# Patient Record
Sex: FEMALE | Race: WHITE | Employment: OTHER | URBAN - METROPOLITAN AREA
[De-identification: names, ages, dates, MRNs, and addresses within clinical notes are randomized per-mention and may not be internally consistent; named-entity substitution may affect disease eponyms.]

---

## 2021-05-25 ENCOUNTER — APPOINTMENT (OUTPATIENT)
Dept: GENERAL RADIOLOGY | Age: 76
DRG: 871 | End: 2021-05-25
Payer: MEDICARE

## 2021-05-25 ENCOUNTER — APPOINTMENT (OUTPATIENT)
Dept: CT IMAGING | Age: 76
DRG: 871 | End: 2021-05-25
Payer: MEDICARE

## 2021-05-25 ENCOUNTER — APPOINTMENT (OUTPATIENT)
Dept: ULTRASOUND IMAGING | Age: 76
DRG: 871 | End: 2021-05-25
Payer: MEDICARE

## 2021-05-25 ENCOUNTER — HOSPITAL ENCOUNTER (INPATIENT)
Age: 76
LOS: 9 days | Discharge: NURSING, REHAB OR LONG-TERM CARE | DRG: 871 | End: 2021-06-03
Attending: EMERGENCY MEDICINE | Admitting: INTERNAL MEDICINE
Payer: MEDICARE

## 2021-05-25 DIAGNOSIS — A41.9 SEPTIC SHOCK (HCC): Primary | ICD-10-CM

## 2021-05-25 DIAGNOSIS — R93.89 ABNORMAL CT SCAN: ICD-10-CM

## 2021-05-25 DIAGNOSIS — N30.01 ACUTE CYSTITIS WITH HEMATURIA: ICD-10-CM

## 2021-05-25 DIAGNOSIS — J96.01 ACUTE RESPIRATORY FAILURE WITH HYPOXIA (HCC): ICD-10-CM

## 2021-05-25 DIAGNOSIS — F41.9 ANXIETY: ICD-10-CM

## 2021-05-25 DIAGNOSIS — I26.99 ACUTE PULMONARY EMBOLISM, UNSPECIFIED PULMONARY EMBOLISM TYPE, UNSPECIFIED WHETHER ACUTE COR PULMONALE PRESENT (HCC): ICD-10-CM

## 2021-05-25 DIAGNOSIS — R65.21 SEPTIC SHOCK (HCC): Primary | ICD-10-CM

## 2021-05-25 DIAGNOSIS — R52 PAIN: ICD-10-CM

## 2021-05-25 LAB
ALBUMIN SERPL-MCNC: 3 G/DL (ref 3.5–5.2)
ALP BLD-CCNC: 94 U/L (ref 35–104)
ALT SERPL-CCNC: 12 U/L (ref 0–32)
ANION GAP SERPL CALCULATED.3IONS-SCNC: 19 MMOL/L (ref 7–16)
ANISOCYTOSIS: ABNORMAL
AST SERPL-CCNC: 24 U/L (ref 0–31)
BACTERIA: ABNORMAL /HPF
BASOPHILS ABSOLUTE: 0 E9/L (ref 0–0.2)
BASOPHILS RELATIVE PERCENT: 0 % (ref 0–2)
BILIRUB SERPL-MCNC: 0.4 MG/DL (ref 0–1.2)
BILIRUBIN URINE: NEGATIVE
BLOOD, URINE: ABNORMAL
BUN BLDV-MCNC: 14 MG/DL (ref 6–23)
CALCIUM SERPL-MCNC: 9 MG/DL (ref 8.6–10.2)
CHLORIDE BLD-SCNC: 95 MMOL/L (ref 98–107)
CLARITY: CLEAR
CO2: 14 MMOL/L (ref 22–29)
COLOR: YELLOW
CREAT SERPL-MCNC: 1.3 MG/DL (ref 0.5–1)
EOSINOPHILS ABSOLUTE: 0 E9/L (ref 0.05–0.5)
EOSINOPHILS RELATIVE PERCENT: 0 % (ref 0–6)
EPITHELIAL CELLS, UA: ABNORMAL /HPF
GFR AFRICAN AMERICAN: 48
GFR NON-AFRICAN AMERICAN: 40 ML/MIN/1.73
GLUCOSE BLD-MCNC: 169 MG/DL (ref 74–99)
GLUCOSE URINE: NEGATIVE MG/DL
HCT VFR BLD CALC: 32.1 % (ref 34–48)
HEMOGLOBIN: 9.4 G/DL (ref 11.5–15.5)
KETONES, URINE: NEGATIVE MG/DL
LACTIC ACID, SEPSIS: 5.1 MMOL/L (ref 0.5–1.9)
LACTIC ACID, SEPSIS: 6.3 MMOL/L (ref 0.5–1.9)
LEUKOCYTE ESTERASE, URINE: ABNORMAL
LYMPHOCYTES ABSOLUTE: 0.7 E9/L (ref 1.5–4)
LYMPHOCYTES RELATIVE PERCENT: 10.3 % (ref 20–42)
MCH RBC QN AUTO: 22.7 PG (ref 26–35)
MCHC RBC AUTO-ENTMCNC: 29.3 % (ref 32–34.5)
MCV RBC AUTO: 77.3 FL (ref 80–99.9)
METAMYELOCYTES RELATIVE PERCENT: 1.7 % (ref 0–1)
METER GLUCOSE: 179 MG/DL (ref 74–99)
MONOCYTES ABSOLUTE: 0 E9/L (ref 0.1–0.95)
MONOCYTES RELATIVE PERCENT: 0 % (ref 2–12)
NEUTROPHILS ABSOLUTE: 6.3 E9/L (ref 1.8–7.3)
NEUTROPHILS RELATIVE PERCENT: 88 % (ref 43–80)
NITRITE, URINE: NEGATIVE
NUCLEATED RED BLOOD CELLS: 0 /100 WBC
OVALOCYTES: ABNORMAL
PDW BLD-RTO: 17.1 FL (ref 11.5–15)
PH UA: 5 (ref 5–9)
PLATELET # BLD: 472 E9/L (ref 130–450)
PMV BLD AUTO: 10.4 FL (ref 7–12)
POIKILOCYTES: ABNORMAL
POLYCHROMASIA: ABNORMAL
POTASSIUM REFLEX MAGNESIUM: 5.3 MMOL/L (ref 3.5–5)
POTASSIUM SERPL-SCNC: 4 MMOL/L (ref 3.5–5)
PRO-BNP: 5097 PG/ML (ref 0–450)
PROTEIN UA: 100 MG/DL
RBC # BLD: 4.15 E12/L (ref 3.5–5.5)
RBC UA: ABNORMAL /HPF (ref 0–2)
SARS-COV-2, NAAT: NOT DETECTED
SCHISTOCYTES: ABNORMAL
SODIUM BLD-SCNC: 128 MMOL/L (ref 132–146)
SPECIFIC GRAVITY UA: 1.02 (ref 1–1.03)
TEAR DROP CELLS: ABNORMAL
TOTAL PROTEIN: 7.1 G/DL (ref 6.4–8.3)
TROPONIN, HIGH SENSITIVITY: 37 NG/L (ref 0–9)
TROPONIN, HIGH SENSITIVITY: 38 NG/L (ref 0–9)
UROBILINOGEN, URINE: 0.2 E.U./DL
WBC # BLD: 7 E9/L (ref 4.5–11.5)
WBC UA: ABNORMAL /HPF (ref 0–5)

## 2021-05-25 PROCEDURE — 82533 TOTAL CORTISOL: CPT

## 2021-05-25 PROCEDURE — 71045 X-RAY EXAM CHEST 1 VIEW: CPT

## 2021-05-25 PROCEDURE — 99223 1ST HOSP IP/OBS HIGH 75: CPT | Performed by: INTERNAL MEDICINE

## 2021-05-25 PROCEDURE — 2580000003 HC RX 258: Performed by: EMERGENCY MEDICINE

## 2021-05-25 PROCEDURE — 87077 CULTURE AEROBIC IDENTIFY: CPT

## 2021-05-25 PROCEDURE — 2500000003 HC RX 250 WO HCPCS: Performed by: EMERGENCY MEDICINE

## 2021-05-25 PROCEDURE — 93970 EXTREMITY STUDY: CPT

## 2021-05-25 PROCEDURE — 96365 THER/PROPH/DIAG IV INF INIT: CPT

## 2021-05-25 PROCEDURE — 70450 CT HEAD/BRAIN W/O DYE: CPT

## 2021-05-25 PROCEDURE — 74176 CT ABD & PELVIS W/O CONTRAST: CPT

## 2021-05-25 PROCEDURE — 2000000000 HC ICU R&B

## 2021-05-25 PROCEDURE — 96367 TX/PROPH/DG ADDL SEQ IV INF: CPT

## 2021-05-25 PROCEDURE — 36592 COLLECT BLOOD FROM PICC: CPT

## 2021-05-25 PROCEDURE — 6370000000 HC RX 637 (ALT 250 FOR IP): Performed by: STUDENT IN AN ORGANIZED HEALTH CARE EDUCATION/TRAINING PROGRAM

## 2021-05-25 PROCEDURE — 71275 CT ANGIOGRAPHY CHEST: CPT

## 2021-05-25 PROCEDURE — 83605 ASSAY OF LACTIC ACID: CPT

## 2021-05-25 PROCEDURE — 96372 THER/PROPH/DIAG INJ SC/IM: CPT

## 2021-05-25 PROCEDURE — 87635 SARS-COV-2 COVID-19 AMP PRB: CPT

## 2021-05-25 PROCEDURE — 2580000003 HC RX 258: Performed by: STUDENT IN AN ORGANIZED HEALTH CARE EDUCATION/TRAINING PROGRAM

## 2021-05-25 PROCEDURE — 87081 CULTURE SCREEN ONLY: CPT

## 2021-05-25 PROCEDURE — 87186 SC STD MICRODIL/AGAR DIL: CPT

## 2021-05-25 PROCEDURE — 87040 BLOOD CULTURE FOR BACTERIA: CPT

## 2021-05-25 PROCEDURE — 6360000002 HC RX W HCPCS: Performed by: STUDENT IN AN ORGANIZED HEALTH CARE EDUCATION/TRAINING PROGRAM

## 2021-05-25 PROCEDURE — 80053 COMPREHEN METABOLIC PANEL: CPT

## 2021-05-25 PROCEDURE — 84484 ASSAY OF TROPONIN QUANT: CPT

## 2021-05-25 PROCEDURE — 87150 DNA/RNA AMPLIFIED PROBE: CPT

## 2021-05-25 PROCEDURE — 93005 ELECTROCARDIOGRAM TRACING: CPT | Performed by: STUDENT IN AN ORGANIZED HEALTH CARE EDUCATION/TRAINING PROGRAM

## 2021-05-25 PROCEDURE — 82962 GLUCOSE BLOOD TEST: CPT

## 2021-05-25 PROCEDURE — 6360000004 HC RX CONTRAST MEDICATION: Performed by: RADIOLOGY

## 2021-05-25 PROCEDURE — 99285 EMERGENCY DEPT VISIT HI MDM: CPT

## 2021-05-25 PROCEDURE — 81001 URINALYSIS AUTO W/SCOPE: CPT

## 2021-05-25 PROCEDURE — 85025 COMPLETE CBC W/AUTO DIFF WBC: CPT

## 2021-05-25 PROCEDURE — 84132 ASSAY OF SERUM POTASSIUM: CPT

## 2021-05-25 PROCEDURE — 83880 ASSAY OF NATRIURETIC PEPTIDE: CPT

## 2021-05-25 RX ORDER — LOPERAMIDE HYDROCHLORIDE 2 MG/1
2 CAPSULE ORAL EVERY 6 HOURS PRN
Status: ON HOLD | COMMUNITY
End: 2021-06-03 | Stop reason: HOSPADM

## 2021-05-25 RX ORDER — UMECLIDINIUM 62.5 UG/1
1 AEROSOL, POWDER ORAL DAILY
Status: ON HOLD | COMMUNITY
End: 2021-06-03 | Stop reason: HOSPADM

## 2021-05-25 RX ORDER — DONEPEZIL HYDROCHLORIDE 5 MG/1
5 TABLET, FILM COATED ORAL NIGHTLY
COMMUNITY

## 2021-05-25 RX ORDER — FERROUS SULFATE 325(65) MG
325 TABLET ORAL
COMMUNITY

## 2021-05-25 RX ORDER — ESCITALOPRAM OXALATE 5 MG/1
10 TABLET ORAL DAILY
Status: ON HOLD | COMMUNITY
End: 2021-06-03 | Stop reason: HOSPADM

## 2021-05-25 RX ORDER — SODIUM CHLORIDE 0.9 % (FLUSH) 0.9 %
5-40 SYRINGE (ML) INJECTION EVERY 12 HOURS SCHEDULED
Status: DISCONTINUED | OUTPATIENT
Start: 2021-05-25 | End: 2021-05-26

## 2021-05-25 RX ORDER — 0.9 % SODIUM CHLORIDE 0.9 %
1000 INTRAVENOUS SOLUTION INTRAVENOUS ONCE
Status: COMPLETED | OUTPATIENT
Start: 2021-05-25 | End: 2021-05-25

## 2021-05-25 RX ORDER — ALBUTEROL SULFATE 90 UG/1
2 AEROSOL, METERED RESPIRATORY (INHALATION) EVERY 8 HOURS
Status: ON HOLD | COMMUNITY
End: 2021-06-03 | Stop reason: HOSPADM

## 2021-05-25 RX ORDER — MECLIZINE HYDROCHLORIDE 25 MG/1
25 TABLET ORAL EVERY 8 HOURS PRN
Status: ON HOLD | COMMUNITY
End: 2021-06-03 | Stop reason: HOSPADM

## 2021-05-25 RX ORDER — SODIUM CHLORIDE 9 MG/ML
25 INJECTION, SOLUTION INTRAVENOUS PRN
Status: DISCONTINUED | OUTPATIENT
Start: 2021-05-25 | End: 2021-06-03 | Stop reason: HOSPADM

## 2021-05-25 RX ORDER — DEXTROSE MONOHYDRATE 50 MG/ML
100 INJECTION, SOLUTION INTRAVENOUS PRN
Status: DISCONTINUED | OUTPATIENT
Start: 2021-05-25 | End: 2021-06-03 | Stop reason: HOSPADM

## 2021-05-25 RX ORDER — 0.9 % SODIUM CHLORIDE 0.9 %
250 INTRAVENOUS SOLUTION INTRAVENOUS ONCE
Status: DISCONTINUED | OUTPATIENT
Start: 2021-05-25 | End: 2021-06-03 | Stop reason: HOSPADM

## 2021-05-25 RX ORDER — SODIUM CHLORIDE 9 MG/ML
INJECTION, SOLUTION INTRAVENOUS EVERY 8 HOURS
Status: DISCONTINUED | OUTPATIENT
Start: 2021-05-25 | End: 2021-05-26

## 2021-05-25 RX ORDER — NICOTINE POLACRILEX 4 MG
15 LOZENGE BUCCAL PRN
Status: DISCONTINUED | OUTPATIENT
Start: 2021-05-25 | End: 2021-06-03 | Stop reason: HOSPADM

## 2021-05-25 RX ORDER — MAGNESIUM OXIDE 400 MG/1
400 TABLET ORAL 2 TIMES DAILY
COMMUNITY

## 2021-05-25 RX ORDER — HYDROXYZINE PAMOATE 25 MG/1
25 CAPSULE ORAL 3 TIMES DAILY PRN
Status: ON HOLD | COMMUNITY
End: 2021-06-03 | Stop reason: HOSPADM

## 2021-05-25 RX ORDER — DEXTROSE MONOHYDRATE 25 G/50ML
12.5 INJECTION, SOLUTION INTRAVENOUS PRN
Status: DISCONTINUED | OUTPATIENT
Start: 2021-05-25 | End: 2021-06-03 | Stop reason: HOSPADM

## 2021-05-25 RX ORDER — ACETAMINOPHEN 650 MG/1
650 SUPPOSITORY RECTAL ONCE
Status: COMPLETED | OUTPATIENT
Start: 2021-05-25 | End: 2021-05-25

## 2021-05-25 RX ORDER — ACETAMINOPHEN 500 MG
1000 TABLET ORAL ONCE
Status: COMPLETED | OUTPATIENT
Start: 2021-05-25 | End: 2021-05-25

## 2021-05-25 RX ORDER — SODIUM CHLORIDE 9 MG/ML
INJECTION, SOLUTION INTRAVENOUS CONTINUOUS
Status: DISCONTINUED | OUTPATIENT
Start: 2021-05-25 | End: 2021-05-26

## 2021-05-25 RX ORDER — SODIUM CHLORIDE 0.9 % (FLUSH) 0.9 %
5-40 SYRINGE (ML) INJECTION PRN
Status: DISCONTINUED | OUTPATIENT
Start: 2021-05-25 | End: 2021-06-03 | Stop reason: HOSPADM

## 2021-05-25 RX ORDER — MAGNESIUM HYDROXIDE/ALUMINUM HYDROXICE/SIMETHICONE 120; 1200; 1200 MG/30ML; MG/30ML; MG/30ML
20 SUSPENSION ORAL 4 TIMES DAILY PRN
COMMUNITY

## 2021-05-25 RX ORDER — ACETAMINOPHEN 325 MG/1
650 TABLET ORAL EVERY 6 HOURS PRN
Status: DISCONTINUED | OUTPATIENT
Start: 2021-05-25 | End: 2021-06-01

## 2021-05-25 RX ORDER — ACETAMINOPHEN 650 MG/1
650 SUPPOSITORY RECTAL EVERY 6 HOURS PRN
Status: DISCONTINUED | OUTPATIENT
Start: 2021-05-25 | End: 2021-06-01

## 2021-05-25 RX ORDER — ACETAMINOPHEN 325 MG/1
325 TABLET ORAL EVERY 6 HOURS PRN
COMMUNITY

## 2021-05-25 RX ORDER — ATORVASTATIN CALCIUM 40 MG/1
40 TABLET, FILM COATED ORAL DAILY
COMMUNITY

## 2021-05-25 RX ORDER — INSULIN GLARGINE 100 [IU]/ML
15 INJECTION, SOLUTION SUBCUTANEOUS NIGHTLY
Status: ON HOLD | COMMUNITY
End: 2021-06-03 | Stop reason: HOSPADM

## 2021-05-25 RX ORDER — CLOPIDOGREL BISULFATE 75 MG/1
75 TABLET ORAL DAILY
Status: ON HOLD | COMMUNITY
End: 2021-06-03 | Stop reason: HOSPADM

## 2021-05-25 RX ORDER — FAMOTIDINE 20 MG/1
20 TABLET, FILM COATED ORAL DAILY
Status: ON HOLD | COMMUNITY
End: 2021-06-03 | Stop reason: HOSPADM

## 2021-05-25 RX ADMIN — ACETAMINOPHEN 1000 MG: 500 TABLET ORAL at 19:18

## 2021-05-25 RX ADMIN — SODIUM CHLORIDE 1000 ML: 9 INJECTION, SOLUTION INTRAVENOUS at 17:07

## 2021-05-25 RX ADMIN — NOREPINEPHRINE BITARTRATE 5 MCG/MIN: 1 INJECTION, SOLUTION, CONCENTRATE INTRAVENOUS at 18:08

## 2021-05-25 RX ADMIN — ACETAMINOPHEN 650 MG: 650 SUPPOSITORY RECTAL at 15:29

## 2021-05-25 RX ADMIN — VANCOMYCIN HYDROCHLORIDE 1500 MG: 10 INJECTION, POWDER, LYOPHILIZED, FOR SOLUTION INTRAVENOUS at 19:07

## 2021-05-25 RX ADMIN — SODIUM CHLORIDE 1000 ML: 9 INJECTION, SOLUTION INTRAVENOUS at 15:15

## 2021-05-25 RX ADMIN — IOPAMIDOL 75 ML: 755 INJECTION, SOLUTION INTRAVENOUS at 16:40

## 2021-05-25 RX ADMIN — HYDROCORTISONE SODIUM SUCCINATE 100 MG: 100 INJECTION, POWDER, FOR SOLUTION INTRAMUSCULAR; INTRAVENOUS at 21:49

## 2021-05-25 RX ADMIN — CEFEPIME HYDROCHLORIDE 2000 MG: 2 INJECTION, POWDER, FOR SOLUTION INTRAVENOUS at 17:12

## 2021-05-25 RX ADMIN — ENOXAPARIN SODIUM 70 MG: 80 INJECTION SUBCUTANEOUS at 19:18

## 2021-05-25 ASSESSMENT — PAIN SCALES - GENERAL
PAINLEVEL_OUTOF10: 0
PAINLEVEL_OUTOF10: 6
PAINLEVEL_OUTOF10: 8

## 2021-05-25 NOTE — ED PROVIDER NOTES
PHYSICIAN ATTESTATION:     I have personally performed and/or participated in the history, exam, medical decision making, and procedures and agree with all pertinent clinical information. I have also reviewed and agree with the past medical, family and social history unless otherwise noted. I have discussed this patient in detail with the resident, and provided the instruction and education regarding sepsis. My findings/Plan: 77 of lung presents for evaluation of fever and tachycardia concerning for sepsis from her nursing home. Patient is alert and has no complaints at this time she denies any pain or shortness of breath however she is on submental oxygen at this time tachycardic and febrile. Septic work-up. She then became hypotensive central line was placed for Levophed. CTA shows bilateral segmental/subsegmental PEs and Lovenox was started patient was started on Rocephin for UTI          [MF]   1721 Found to have PEs bilateral.    [WL]   1924 Spoke with Dr. Kane Stephen, he agrees to admit the patient to the ICU    [WL]   2049 Spoke with Dr. Shahbaz Mckay, he agrees to admit the patient.    [WL]      ED Course User Index  [MF] Devorah Sweet DO  [WL] Dario Del Rosario DO        Patient presents to the ED for evaluation. This patient was seen and evaluated with the attending. Work-up was performed with concerns for but not limited to ACS, arrhythmia, Pneumonia, Viral illness and UTI  Lab work and imaging showed urinary tract infection. Patient was septic so was started with a sepsis bolus of fluid but despite this she remained hypotensive. She was then started on levo. Despite levo she remained hypotensive and was given a dose of Solu-Cortef. ICU will admit the patient to them under medicine's admission. She was given broad-spectrum antibiotics. She was found to be hypoxic which she is not normally on oxygen on but was placed on O2 nasal cannula with good improvement of her O2 saturations.   She Eosinophils % 0.0 0.0 - 6.0 %    Basophils % 0.0 0.0 - 2.0 %    Neutrophils Absolute 6.30 1.80 - 7.30 E9/L    Lymphocytes Absolute 0.70 (L) 1.50 - 4.00 E9/L    Monocytes Absolute 0.00 (L) 0.10 - 0.95 E9/L    Eosinophils Absolute 0.00 (L) 0.05 - 0.50 E9/L    Basophils Absolute 0.00 0.00 - 0.20 E9/L    Metamyelocytes Relative 1.7 (H) 0.0 - 1.0 %    nRBC 0.0 /100 WBC    Anisocytosis 1+     Polychromasia 1+     Poikilocytes 1+     Schistocytes 1+     Ovalocytes 1+     Tear Drop Cells 1+    Troponin   Result Value Ref Range    Troponin, High Sensitivity 37 (H) 0 - 9 ng/mL   Brain Natriuretic Peptide   Result Value Ref Range    Pro-BNP 5,097 (H) 0 - 450 pg/mL   Comprehensive Metabolic Panel w/ Reflex to MG   Result Value Ref Range    Sodium 128 (L) 132 - 146 mmol/L    Potassium reflex Magnesium 5.3 (H) 3.5 - 5.0 mmol/L    Chloride 95 (L) 98 - 107 mmol/L    CO2 14 (L) 22 - 29 mmol/L    Anion Gap 19 (H) 7 - 16 mmol/L    Glucose 169 (H) 74 - 99 mg/dL    BUN 14 6 - 23 mg/dL    CREATININE 1.3 (H) 0.5 - 1.0 mg/dL    GFR Non-African American 40 >=60 mL/min/1.73    GFR African American 48     Calcium 9.0 8.6 - 10.2 mg/dL    Total Protein 7.1 6.4 - 8.3 g/dL    Albumin 3.0 (L) 3.5 - 5.2 g/dL    Total Bilirubin 0.4 0.0 - 1.2 mg/dL    Alkaline Phosphatase 94 35 - 104 U/L    ALT 12 0 - 32 U/L    AST 24 0 - 31 U/L   Lactate, Sepsis   Result Value Ref Range    Lactic Acid, Sepsis 6.3 (HH) 0.5 - 1.9 mmol/L   Lactate, Sepsis   Result Value Ref Range    Lactic Acid, Sepsis 5.1 (HH) 0.5 - 1.9 mmol/L   Urinalysis with Microscopic   Result Value Ref Range    Color, UA Yellow Straw/Yellow    Clarity, UA Clear Clear    Glucose, Ur Negative Negative mg/dL    Bilirubin Urine Negative Negative    Ketones, Urine Negative Negative mg/dL    Specific Gravity, UA 1.020 1.005 - 1.030    Blood, Urine TRACE-INTACT Negative    pH, UA 5.0 5.0 - 9.0    Protein,  (A) Negative mg/dL    Urobilinogen, Urine 0.2 <2.0 E.U./dL    Nitrite, Urine Negative Negative    Leukocyte Esterase, Urine SMALL (A) Negative    WBC, UA 5-10 (A) 0 - 5 /HPF    RBC, UA 5-10 (A) 0 - 2 /HPF    Epithelial Cells, UA FEW /HPF    Bacteria, UA MODERATE (A) None Seen /HPF   Potassium   Result Value Ref Range    Potassium 4.0 3.5 - 5.0 mmol/L   Troponin   Result Value Ref Range    Troponin, High Sensitivity 38 (H) 0 - 9 ng/mL   POCT Glucose   Result Value Ref Range    Meter Glucose 179 (H) 74 - 99 mg/dL   EKG 12 Lead   Result Value Ref Range    Ventricular Rate 118 BPM    Atrial Rate 118 BPM    P-R Interval 128 ms    QRS Duration 80 ms    Q-T Interval 344 ms    QTc Calculation (Bazett) 482 ms    P Axis 36 degrees    R Axis -8 degrees    T Axis 59 degrees       Radiology  US DUP LOWER EXTREMITIES BILATERAL VENOUS   Final Result   No evidence of DVT in either lower extremity given the limitations described. CT ABDOMEN PELVIS WO CONTRAST Additional Contrast? None   Final Result   Large gallstones in the dependent portion of the gallbladder. Mostly hypodense and homogeneous right adrenal nodule measuring 2.8 cm in   diameter, probable adenoma. Further evaluation with adrenal washout CT or   chemical shift MRI recommended. Other chronic or incidental findings as noted. CTA CHEST W CONTRAST   Final Result   1. There are limitation on the present study due motion artifacts caused   misregistration of the images as above discussed. 2.  However there appears to be signs for acute/subacute pulmonary embolus in   the lower lobe pulmonary arteries more noticeable on the right lower lobe as   above commented. Preliminary report given to Dr. Noel Frank, ER physician borderline. CT HEAD WO CONTRAST   Final Result   No acute intracranial abnormality. Periventricular white matter changes consistent chronic microvascular   disease. Diffuse volume loss.          XR CHEST PORTABLE   Final Result   Interstitial opacities in perihilar locations could suggest subsegmental   atelectasis or bronchopneumonia. Short-term follow-up may be helpful for   further evaluation.                 ------------------------- NURSING NOTES AND VITALS REVIEWED ---------------------------  Date / Time Roomed:  5/25/2021  2:10 PM  ED Bed Assignment:  0207/0207-A    The nursing notes within the ED encounter and vital signs as below have been reviewed. Patient Vitals for the past 24 hrs:   BP Temp Temp src Pulse Resp SpO2 Height Weight   05/26/21 0000 (!) 129/44 99.5 °F (37.5 °C) Bladder 85 20 93 % -- 165 lb 12.6 oz (75.2 kg)   05/25/21 2300 (!) 100/43 99.8 °F (37.7 °C) Bladder 83 18 95 % -- --   05/25/21 2146 (!) 102/49 99.5 °F (37.5 °C) CORE 88 16 98 % -- --   05/25/21 2100 (!) 113/53 -- -- 88 -- -- -- --   05/25/21 2050 (!) 100/50 99.5 °F (37.5 °C) CORE 89 16 95 % -- --   05/25/21 2036 (!) 60/30 -- -- -- -- -- -- --   05/25/21 2020 (!) 64/27 -- -- 89 16 96 % -- --   05/25/21 1950 (!) 51/33 -- -- 91 -- -- -- --   05/25/21 1940 (!) 53/25 100.8 °F (38.2 °C) CORE 94 16 96 % -- --   05/25/21 1858 (!) 70/39 -- -- 94 16 94 % -- --   05/25/21 1847 (!) 66/33 -- -- 94 -- 92 % -- --   05/25/21 1822 (!) 55/32 101.3 °F (38.5 °C) -- 97 16 93 % -- --   05/25/21 1805 (!) 48/31 101.5 °F (38.6 °C) -- 98 16 96 % -- --   05/25/21 1735 (!) 58/27 101.7 °F (38.7 °C) CORE 96 16 92 % -- --   05/25/21 1719 (!) 60/32 -- -- 97 16 95 % -- --   05/25/21 1608 -- -- -- -- -- -- -- 164 lb (74.4 kg)   05/25/21 1424 -- -- -- -- -- -- 5' (1.524 m) --   05/25/21 1423 -- (!) 105.4 °F (40.8 °C) Rectal -- -- -- -- --   05/25/21 1418 (!) 151/64 102.8 °F (39.3 °C) -- 129 16 95 % 5' (1.524 m) --       Oxygen Saturation Interpretation: Abnormal and Improved after treatment        I have spoken with the patient and discussed todays results, in addition to providing specific details for the plan of care and counseling regarding the diagnosis and prognosis.   Their questions are answered at this time and they are agreeable with the

## 2021-05-25 NOTE — ED NOTES
Bed: 10  Expected date:   Expected time:   Means of arrival:   Comments:  ems     Bianca Boykin RN  05/25/21 1536

## 2021-05-25 NOTE — ED NOTES
Blood pressure 60/32 manual. Dr. Jennifer Seth notified. Orders to set up for [facility].      Dewey Dawn RN  05/25/21 1122

## 2021-05-26 LAB
ACINETOBACTER BAUMANNII BY PCR: NOT DETECTED
ALBUMIN SERPL-MCNC: 2.7 G/DL (ref 3.5–5.2)
ALP BLD-CCNC: 78 U/L (ref 35–104)
ALT SERPL-CCNC: 10 U/L (ref 0–32)
ANION GAP SERPL CALCULATED.3IONS-SCNC: 14 MMOL/L (ref 7–16)
ANION GAP SERPL CALCULATED.3IONS-SCNC: 16 MMOL/L (ref 7–16)
ANISOCYTOSIS: ABNORMAL
AST SERPL-CCNC: 14 U/L (ref 0–31)
BASOPHILS ABSOLUTE: 0.26 E9/L (ref 0–0.2)
BASOPHILS RELATIVE PERCENT: 0.9 % (ref 0–2)
BILIRUB SERPL-MCNC: 0.3 MG/DL (ref 0–1.2)
BOTTLE TYPE: ABNORMAL
BUN BLDV-MCNC: 17 MG/DL (ref 6–23)
BUN BLDV-MCNC: 19 MG/DL (ref 6–23)
BURR CELLS: ABNORMAL
CALCIUM SERPL-MCNC: 7.6 MG/DL (ref 8.6–10.2)
CALCIUM SERPL-MCNC: 8 MG/DL (ref 8.6–10.2)
CANDIDA ALBICANS BY PCR: NOT DETECTED
CANDIDA GLABRATA BY PCR: NOT DETECTED
CANDIDA KRUSEI BY PCR: NOT DETECTED
CANDIDA PARAPSILOSIS BY PCR: NOT DETECTED
CANDIDA TROPICALIS BY PCR: NOT DETECTED
CARBAPENEM RESISTANCE KPC BY PCR: NOT DETECTED
CHLORIDE BLD-SCNC: 104 MMOL/L (ref 98–107)
CHLORIDE BLD-SCNC: 105 MMOL/L (ref 98–107)
CO2: 14 MMOL/L (ref 22–29)
CO2: 15 MMOL/L (ref 22–29)
CORTISOL TOTAL: 66.6 MCG/DL (ref 2.68–18.4)
CREAT SERPL-MCNC: 1.3 MG/DL (ref 0.5–1)
CREAT SERPL-MCNC: 1.4 MG/DL (ref 0.5–1)
DOHLE BODIES: ABNORMAL
EKG ATRIAL RATE: 118 BPM
EKG P AXIS: 36 DEGREES
EKG P-R INTERVAL: 128 MS
EKG Q-T INTERVAL: 344 MS
EKG QRS DURATION: 80 MS
EKG QTC CALCULATION (BAZETT): 482 MS
EKG R AXIS: -8 DEGREES
EKG T AXIS: 59 DEGREES
EKG VENTRICULAR RATE: 118 BPM
ENTEROBACTER CLOACAE COMPLEX BY PCR: NOT DETECTED
ENTEROBACTERALES BY PCR: DETECTED
ENTEROCOCCUS BY PCR: NOT DETECTED
EOSINOPHILS ABSOLUTE: 0.26 E9/L (ref 0.05–0.5)
EOSINOPHILS RELATIVE PERCENT: 0.9 % (ref 0–6)
ESCHERICHIA COLI BY PCR: NOT DETECTED
FERRITIN: 81 NG/ML
GFR AFRICAN AMERICAN: 44
GFR AFRICAN AMERICAN: 48
GFR NON-AFRICAN AMERICAN: 36 ML/MIN/1.73
GFR NON-AFRICAN AMERICAN: 40 ML/MIN/1.73
GLUCOSE BLD-MCNC: 160 MG/DL (ref 74–99)
GLUCOSE BLD-MCNC: 222 MG/DL (ref 74–99)
HAEMOPHILUS INFLUENZAE BY PCR: NOT DETECTED
HBA1C MFR BLD: 5.6 % (ref 4–5.6)
HCT VFR BLD CALC: 26.5 % (ref 34–48)
HEMOCCULT STL QL: ABNORMAL
HEMOCCULT STL QL: ABNORMAL
HEMOCCULT STL QL: POSITIVE
HEMOGLOBIN: 7.9 G/DL (ref 11.5–15.5)
IRON SATURATION: 4 % (ref 15–50)
IRON: 8 MCG/DL (ref 37–145)
KLEBSIELLA OXYTOCA BY PCR: NOT DETECTED
KLEBSIELLA PNEUMONIAE GROUP BY PCR: DETECTED
LACTIC ACID: 3.1 MMOL/L (ref 0.5–2.2)
LACTIC ACID: 4 MMOL/L (ref 0.5–2.2)
LACTIC ACID: 4.8 MMOL/L (ref 0.5–2.2)
LACTIC ACID: 5.4 MMOL/L (ref 0.5–2.2)
LISTERIA MONOCYTOGENES BY PCR: NOT DETECTED
LV EF: 58 %
LVEF MODALITY: NORMAL
LYMPHOCYTES ABSOLUTE: 0.86 E9/L (ref 1.5–4)
LYMPHOCYTES RELATIVE PERCENT: 2.6 % (ref 20–42)
MCH RBC QN AUTO: 23.2 PG (ref 26–35)
MCHC RBC AUTO-ENTMCNC: 29.8 % (ref 32–34.5)
MCV RBC AUTO: 77.9 FL (ref 80–99.9)
METAMYELOCYTES RELATIVE PERCENT: 11.5 % (ref 0–1)
METER GLUCOSE: 132 MG/DL (ref 74–99)
METER GLUCOSE: 184 MG/DL (ref 74–99)
METER GLUCOSE: 223 MG/DL (ref 74–99)
METER GLUCOSE: 274 MG/DL (ref 74–99)
MONOCYTES ABSOLUTE: 0.57 E9/L (ref 0.1–0.95)
MONOCYTES RELATIVE PERCENT: 1.8 % (ref 2–12)
MYELOCYTE PERCENT: 6.2 % (ref 0–0)
NEISSERIA MENINGITIDIS BY PCR: NOT DETECTED
NEUTROPHILS ABSOLUTE: 26.88 E9/L (ref 1.8–7.3)
NEUTROPHILS RELATIVE PERCENT: 76.1 % (ref 43–80)
NUCLEATED RED BLOOD CELLS: 0 /100 WBC
ORDER NUMBER: ABNORMAL
OVALOCYTES: ABNORMAL
PDW BLD-RTO: 17.3 FL (ref 11.5–15)
PLATELET # BLD: 385 E9/L (ref 130–450)
PMV BLD AUTO: 11.2 FL (ref 7–12)
POIKILOCYTES: ABNORMAL
POLYCHROMASIA: ABNORMAL
POTASSIUM REFLEX MAGNESIUM: 4.2 MMOL/L (ref 3.5–5)
POTASSIUM SERPL-SCNC: 4.8 MMOL/L (ref 3.5–5)
PROTEUS SPECIES BY PCR: NOT DETECTED
PSEUDOMONAS AERUGINOSA BY PCR: NOT DETECTED
RBC # BLD: 3.4 E12/L (ref 3.5–5.5)
SCHISTOCYTES: ABNORMAL
SERRATIA MARCESCENS BY PCR: NOT DETECTED
SODIUM BLD-SCNC: 134 MMOL/L (ref 132–146)
SODIUM BLD-SCNC: 134 MMOL/L (ref 132–146)
SOURCE OF BLOOD CULTURE: ABNORMAL
STAPHYLOCOCCUS AUREUS BY PCR: NOT DETECTED
STAPHYLOCOCCUS SPECIES BY PCR: NOT DETECTED
STREPTOCOCCUS AGALACTIAE BY PCR: NOT DETECTED
STREPTOCOCCUS PNEUMONIAE BY PCR: NOT DETECTED
STREPTOCOCCUS PYOGENES  BY PCR: NOT DETECTED
STREPTOCOCCUS SPECIES BY PCR: NOT DETECTED
TEAR DROP CELLS: ABNORMAL
TOTAL IRON BINDING CAPACITY: 197 MCG/DL (ref 250–450)
TOTAL PROTEIN: 6.3 G/DL (ref 6.4–8.3)
TOXIC GRANULATION: ABNORMAL
VACUOLATED NEUTROPHILS: ABNORMAL
WBC # BLD: 28.6 E9/L (ref 4.5–11.5)

## 2021-05-26 PROCEDURE — 6360000002 HC RX W HCPCS: Performed by: INTERNAL MEDICINE

## 2021-05-26 PROCEDURE — 6360000002 HC RX W HCPCS: Performed by: SPECIALIST

## 2021-05-26 PROCEDURE — 82728 ASSAY OF FERRITIN: CPT

## 2021-05-26 PROCEDURE — 2580000003 HC RX 258: Performed by: SPECIALIST

## 2021-05-26 PROCEDURE — 85025 COMPLETE CBC W/AUTO DIFF WBC: CPT

## 2021-05-26 PROCEDURE — 2580000003 HC RX 258: Performed by: EMERGENCY MEDICINE

## 2021-05-26 PROCEDURE — 2580000003 HC RX 258: Performed by: INTERNAL MEDICINE

## 2021-05-26 PROCEDURE — 99221 1ST HOSP IP/OBS SF/LOW 40: CPT | Performed by: NURSE PRACTITIONER

## 2021-05-26 PROCEDURE — 80048 BASIC METABOLIC PNL TOTAL CA: CPT

## 2021-05-26 PROCEDURE — 2000000000 HC ICU R&B

## 2021-05-26 PROCEDURE — 6360000002 HC RX W HCPCS: Performed by: STUDENT IN AN ORGANIZED HEALTH CARE EDUCATION/TRAINING PROGRAM

## 2021-05-26 PROCEDURE — 36620 INSERTION CATHETER ARTERY: CPT

## 2021-05-26 PROCEDURE — 37799 UNLISTED PX VASCULAR SURGERY: CPT

## 2021-05-26 PROCEDURE — 83550 IRON BINDING TEST: CPT

## 2021-05-26 PROCEDURE — 36415 COLL VENOUS BLD VENIPUNCTURE: CPT

## 2021-05-26 PROCEDURE — 2580000003 HC RX 258: Performed by: STUDENT IN AN ORGANIZED HEALTH CARE EDUCATION/TRAINING PROGRAM

## 2021-05-26 PROCEDURE — 2700000000 HC OXYGEN THERAPY PER DAY

## 2021-05-26 PROCEDURE — 2500000003 HC RX 250 WO HCPCS: Performed by: EMERGENCY MEDICINE

## 2021-05-26 PROCEDURE — 80053 COMPREHEN METABOLIC PANEL: CPT

## 2021-05-26 PROCEDURE — 2580000003 HC RX 258

## 2021-05-26 PROCEDURE — C9113 INJ PANTOPRAZOLE SODIUM, VIA: HCPCS | Performed by: STUDENT IN AN ORGANIZED HEALTH CARE EDUCATION/TRAINING PROGRAM

## 2021-05-26 PROCEDURE — 83540 ASSAY OF IRON: CPT

## 2021-05-26 PROCEDURE — 6370000000 HC RX 637 (ALT 250 FOR IP): Performed by: INTERNAL MEDICINE

## 2021-05-26 PROCEDURE — 2500000003 HC RX 250 WO HCPCS: Performed by: INTERNAL MEDICINE

## 2021-05-26 PROCEDURE — 99233 SBSQ HOSP IP/OBS HIGH 50: CPT | Performed by: INTERNAL MEDICINE

## 2021-05-26 PROCEDURE — 83605 ASSAY OF LACTIC ACID: CPT

## 2021-05-26 PROCEDURE — 6370000000 HC RX 637 (ALT 250 FOR IP): Performed by: STUDENT IN AN ORGANIZED HEALTH CARE EDUCATION/TRAINING PROGRAM

## 2021-05-26 PROCEDURE — 82962 GLUCOSE BLOOD TEST: CPT

## 2021-05-26 PROCEDURE — 83036 HEMOGLOBIN GLYCOSYLATED A1C: CPT

## 2021-05-26 PROCEDURE — 36592 COLLECT BLOOD FROM PICC: CPT

## 2021-05-26 PROCEDURE — 94640 AIRWAY INHALATION TREATMENT: CPT

## 2021-05-26 PROCEDURE — 93306 TTE W/DOPPLER COMPLETE: CPT

## 2021-05-26 RX ORDER — IPRATROPIUM BROMIDE AND ALBUTEROL SULFATE 2.5; .5 MG/3ML; MG/3ML
1 SOLUTION RESPIRATORY (INHALATION)
Status: DISCONTINUED | OUTPATIENT
Start: 2021-05-26 | End: 2021-06-03 | Stop reason: HOSPADM

## 2021-05-26 RX ORDER — HYDROXYZINE PAMOATE 25 MG/1
25 CAPSULE ORAL 3 TIMES DAILY PRN
Status: DISCONTINUED | OUTPATIENT
Start: 2021-05-26 | End: 2021-06-03 | Stop reason: HOSPADM

## 2021-05-26 RX ORDER — PANTOPRAZOLE SODIUM 40 MG/10ML
40 INJECTION, POWDER, LYOPHILIZED, FOR SOLUTION INTRAVENOUS 2 TIMES DAILY
Status: DISCONTINUED | OUTPATIENT
Start: 2021-05-26 | End: 2021-06-03 | Stop reason: HOSPADM

## 2021-05-26 RX ORDER — SODIUM CHLORIDE 9 MG/ML
10 INJECTION INTRAVENOUS 2 TIMES DAILY
Status: DISCONTINUED | OUTPATIENT
Start: 2021-05-26 | End: 2021-06-03 | Stop reason: HOSPADM

## 2021-05-26 RX ORDER — LORAZEPAM 2 MG/ML
0.5 INJECTION INTRAMUSCULAR EVERY 6 HOURS PRN
Status: DISCONTINUED | OUTPATIENT
Start: 2021-05-26 | End: 2021-06-03 | Stop reason: HOSPADM

## 2021-05-26 RX ORDER — FENTANYL CITRATE 50 UG/ML
25 INJECTION, SOLUTION INTRAMUSCULAR; INTRAVENOUS
Status: DISCONTINUED | OUTPATIENT
Start: 2021-05-26 | End: 2021-06-01

## 2021-05-26 RX ORDER — ARFORMOTEROL TARTRATE 15 UG/2ML
15 SOLUTION RESPIRATORY (INHALATION) 2 TIMES DAILY
Status: DISCONTINUED | OUTPATIENT
Start: 2021-05-26 | End: 2021-06-03 | Stop reason: HOSPADM

## 2021-05-26 RX ORDER — 0.9 % SODIUM CHLORIDE 0.9 %
1000 INTRAVENOUS SOLUTION INTRAVENOUS ONCE
Status: COMPLETED | OUTPATIENT
Start: 2021-05-26 | End: 2021-05-26

## 2021-05-26 RX ORDER — SODIUM CHLORIDE, SODIUM LACTATE, POTASSIUM CHLORIDE, AND CALCIUM CHLORIDE .6; .31; .03; .02 G/100ML; G/100ML; G/100ML; G/100ML
1000 INJECTION, SOLUTION INTRAVENOUS ONCE
Status: COMPLETED | OUTPATIENT
Start: 2021-05-26 | End: 2021-05-26

## 2021-05-26 RX ORDER — SODIUM CHLORIDE 9 MG/ML
INJECTION, SOLUTION INTRAVENOUS EVERY 12 HOURS
Status: DISCONTINUED | OUTPATIENT
Start: 2021-05-26 | End: 2021-05-28

## 2021-05-26 RX ORDER — PANTOPRAZOLE SODIUM 40 MG/10ML
40 INJECTION, POWDER, LYOPHILIZED, FOR SOLUTION INTRAVENOUS
Status: DISCONTINUED | OUTPATIENT
Start: 2021-05-26 | End: 2021-05-26

## 2021-05-26 RX ORDER — SODIUM CHLORIDE 9 MG/ML
10 INJECTION INTRAVENOUS DAILY
Status: DISCONTINUED | OUTPATIENT
Start: 2021-05-26 | End: 2021-05-26

## 2021-05-26 RX ORDER — LANOLIN ALCOHOL/MO/W.PET/CERES
3 CREAM (GRAM) TOPICAL NIGHTLY PRN
Status: DISCONTINUED | OUTPATIENT
Start: 2021-05-26 | End: 2021-06-03 | Stop reason: HOSPADM

## 2021-05-26 RX ORDER — BUDESONIDE 0.5 MG/2ML
0.5 INHALANT ORAL 2 TIMES DAILY
Status: DISCONTINUED | OUTPATIENT
Start: 2021-05-26 | End: 2021-06-03 | Stop reason: HOSPADM

## 2021-05-26 RX ADMIN — SODIUM CHLORIDE, PRESERVATIVE FREE 10 ML: 5 INJECTION INTRAVENOUS at 09:11

## 2021-05-26 RX ADMIN — IPRATROPIUM BROMIDE AND ALBUTEROL SULFATE 1 AMPULE: 2.5; .5 SOLUTION RESPIRATORY (INHALATION) at 19:55

## 2021-05-26 RX ADMIN — INSULIN LISPRO 2 UNITS: 100 INJECTION, SOLUTION INTRAVENOUS; SUBCUTANEOUS at 18:22

## 2021-05-26 RX ADMIN — CEFEPIME HYDROCHLORIDE 2000 MG: 2 INJECTION, POWDER, FOR SOLUTION INTRAVENOUS at 14:31

## 2021-05-26 RX ADMIN — SODIUM CHLORIDE: 9 INJECTION, SOLUTION INTRAVENOUS at 07:52

## 2021-05-26 RX ADMIN — SODIUM CHLORIDE, POTASSIUM CHLORIDE, SODIUM LACTATE AND CALCIUM CHLORIDE 1000 ML: 600; 310; 30; 20 INJECTION, SOLUTION INTRAVENOUS at 15:10

## 2021-05-26 RX ADMIN — Medication: at 13:58

## 2021-05-26 RX ADMIN — Medication: at 21:07

## 2021-05-26 RX ADMIN — FENTANYL CITRATE 25 MCG: 50 INJECTION, SOLUTION INTRAMUSCULAR; INTRAVENOUS at 11:44

## 2021-05-26 RX ADMIN — INSULIN LISPRO 3 UNITS: 100 INJECTION, SOLUTION INTRAVENOUS; SUBCUTANEOUS at 11:47

## 2021-05-26 RX ADMIN — INSULIN LISPRO 1 UNITS: 100 INJECTION, SOLUTION INTRAVENOUS; SUBCUTANEOUS at 00:36

## 2021-05-26 RX ADMIN — SODIUM CHLORIDE, PRESERVATIVE FREE 10 ML: 5 INJECTION INTRAVENOUS at 20:23

## 2021-05-26 RX ADMIN — ARFORMOTEROL TARTRATE 15 MCG: 15 SOLUTION RESPIRATORY (INHALATION) at 13:13

## 2021-05-26 RX ADMIN — INSULIN LISPRO 2 UNITS: 100 INJECTION, SOLUTION INTRAVENOUS; SUBCUTANEOUS at 09:29

## 2021-05-26 RX ADMIN — HYDROXYZINE PAMOATE 25 MG: 25 CAPSULE ORAL at 01:43

## 2021-05-26 RX ADMIN — HYDROCORTISONE SODIUM SUCCINATE 100 MG: 100 INJECTION, POWDER, FOR SOLUTION INTRAMUSCULAR; INTRAVENOUS at 21:05

## 2021-05-26 RX ADMIN — NOREPINEPHRINE BITARTRATE 35 MCG/MIN: 1 INJECTION, SOLUTION, CONCENTRATE INTRAVENOUS at 06:16

## 2021-05-26 RX ADMIN — FENTANYL CITRATE 25 MCG: 50 INJECTION, SOLUTION INTRAMUSCULAR; INTRAVENOUS at 09:11

## 2021-05-26 RX ADMIN — IPRATROPIUM BROMIDE AND ALBUTEROL SULFATE 1 AMPULE: 2.5; .5 SOLUTION RESPIRATORY (INHALATION) at 17:01

## 2021-05-26 RX ADMIN — ENOXAPARIN SODIUM 70 MG: 80 INJECTION SUBCUTANEOUS at 20:23

## 2021-05-26 RX ADMIN — HYDROCORTISONE SODIUM SUCCINATE 100 MG: 100 INJECTION, POWDER, FOR SOLUTION INTRAMUSCULAR; INTRAVENOUS at 14:31

## 2021-05-26 RX ADMIN — HYDROCORTISONE SODIUM SUCCINATE 100 MG: 100 INJECTION, POWDER, FOR SOLUTION INTRAMUSCULAR; INTRAVENOUS at 07:25

## 2021-05-26 RX ADMIN — WATER 2 ML: 1 INJECTION INTRAMUSCULAR; INTRAVENOUS; SUBCUTANEOUS at 21:06

## 2021-05-26 RX ADMIN — ENOXAPARIN SODIUM 70 MG: 80 INJECTION SUBCUTANEOUS at 08:25

## 2021-05-26 RX ADMIN — NOREPINEPHRINE BITARTRATE 30 MCG/MIN: 1 INJECTION, SOLUTION, CONCENTRATE INTRAVENOUS at 13:58

## 2021-05-26 RX ADMIN — PANTOPRAZOLE SODIUM 40 MG: 40 INJECTION, POWDER, FOR SOLUTION INTRAVENOUS at 20:23

## 2021-05-26 RX ADMIN — BUDESONIDE 500 MCG: 0.5 SUSPENSION RESPIRATORY (INHALATION) at 19:55

## 2021-05-26 RX ADMIN — ERTAPENEM SODIUM 1000 MG: 1 INJECTION, POWDER, LYOPHILIZED, FOR SOLUTION INTRAMUSCULAR; INTRAVENOUS at 11:44

## 2021-05-26 RX ADMIN — PANTOPRAZOLE SODIUM 40 MG: 40 INJECTION, POWDER, FOR SOLUTION INTRAVENOUS at 09:11

## 2021-05-26 RX ADMIN — ACETAMINOPHEN 650 MG: 325 TABLET ORAL at 01:46

## 2021-05-26 RX ADMIN — SODIUM CHLORIDE 1000 ML: 9 INJECTION, SOLUTION INTRAVENOUS at 11:31

## 2021-05-26 RX ADMIN — ARFORMOTEROL TARTRATE 15 MCG: 15 SOLUTION RESPIRATORY (INHALATION) at 19:55

## 2021-05-26 RX ADMIN — SODIUM CHLORIDE: 9 INJECTION, SOLUTION INTRAVENOUS at 17:37

## 2021-05-26 RX ADMIN — LORAZEPAM 0.5 MG: 2 INJECTION INTRAMUSCULAR; INTRAVENOUS at 14:10

## 2021-05-26 RX ADMIN — HYDROMORPHONE HYDROCHLORIDE 0.5 MG: 1 INJECTION, SOLUTION INTRAMUSCULAR; INTRAVENOUS; SUBCUTANEOUS at 02:32

## 2021-05-26 RX ADMIN — BUDESONIDE 500 MCG: 0.5 SUSPENSION RESPIRATORY (INHALATION) at 13:13

## 2021-05-26 RX ADMIN — CEFEPIME 1000 MG: 1 INJECTION, POWDER, FOR SOLUTION INTRAMUSCULAR; INTRAVENOUS at 06:18

## 2021-05-26 RX ADMIN — SODIUM CHLORIDE: 9 INJECTION, SOLUTION INTRAVENOUS at 00:35

## 2021-05-26 ASSESSMENT — PAIN SCALES - GENERAL
PAINLEVEL_OUTOF10: 0
PAINLEVEL_OUTOF10: 0
PAINLEVEL_OUTOF10: 5
PAINLEVEL_OUTOF10: 0
PAINLEVEL_OUTOF10: 4
PAINLEVEL_OUTOF10: 0
PAINLEVEL_OUTOF10: 0
PAINLEVEL_OUTOF10: 10
PAINLEVEL_OUTOF10: 0
PAINLEVEL_OUTOF10: 6
PAINLEVEL_OUTOF10: 0

## 2021-05-26 ASSESSMENT — PAIN DESCRIPTION - DESCRIPTORS
DESCRIPTORS: ACHING;DISCOMFORT;SORE
DESCRIPTORS: ACHING
DESCRIPTORS: ACHING;DISCOMFORT;SORE

## 2021-05-26 ASSESSMENT — PAIN DESCRIPTION - FREQUENCY
FREQUENCY: CONTINUOUS
FREQUENCY: CONTINUOUS

## 2021-05-26 ASSESSMENT — PAIN DESCRIPTION - LOCATION
LOCATION: GENERALIZED
LOCATION: BACK;COCCYX;GENERALIZED
LOCATION: BACK

## 2021-05-26 ASSESSMENT — PAIN DESCRIPTION - PROGRESSION: CLINICAL_PROGRESSION: RAPIDLY WORSENING

## 2021-05-26 ASSESSMENT — PAIN DESCRIPTION - PAIN TYPE
TYPE: ACUTE PAIN

## 2021-05-26 ASSESSMENT — PAIN - FUNCTIONAL ASSESSMENT: PAIN_FUNCTIONAL_ASSESSMENT: PREVENTS OR INTERFERES SOME ACTIVE ACTIVITIES AND ADLS

## 2021-05-26 ASSESSMENT — PAIN DESCRIPTION - ORIENTATION: ORIENTATION: LOWER

## 2021-05-26 ASSESSMENT — PAIN DESCRIPTION - ONSET: ONSET: SUDDEN

## 2021-05-26 NOTE — H&P
[facility] History and Physical      CHIEF COMPLAINT: Fever    History of Present Illness: 53-year-old female with a history of hypertension stage I diastolic heart failure and diabetes. Resides at [facility]. It is unclear what brought her to the ED but apparently she was sent due to concern for infection. On presentation her temperature was 105. Soon after presentation her blood pressure dropped as low as 51/25 requiring Levophed. Also placed on 6 L of oxygen maintaining saturation of 96%. When seen the patient is oriented x3 but believes she was still at [facility]. She does not recall the events of today and also seems to have short-term memory difficulties. Denies any complaints. Does not recall any fever before presentation, no cough, no shortness of breath, no burning micturition, no nausea vomiting or diarrhea. When seen on Levophed 17 with , on 6 L of oxygen saturating 96%. CT of the chest showed possible emboli so started on anticoagulation. Informant(s) for H&P: Patient    REVIEW OF SYSTEMS:  A comprehensive 14 point review of systems was negative except for: what is in the HPI    Active Problems  Reconcile with Patient's Chart  Problem Noted Date   Status post bilateral total hip replacement 08/08/2018   Overview:     1. LTHR 2015  2. RTHR in 1/2016 by Dr. Chichi Jason    Obesity (BMI 35.0-39.9 without comorbidity) 08/11/2017   Overview:     1. Gzctob=021 lbs, BMI=37.11 on 8/11/2017, cigmbs=123 lbs, BMI=37.69 on 8/8/2018     Allergy to penicillin 08/11/2017   Proteinuria 08/11/2017   Overview:     1. Evaluated by Dr. Urbano Salazar hypertension 08/07/2017   S/P hysterectomy 08/07/2017   Atypical chest pain 08/07/2017   Overview:     1. Regadenoson MPS 9/25/2014: Normal perfusion and wall motion, EF=96%  2.  ECHO 6/1/2017: Mild CLVH, normal LV size and wall motion, stage I diastolic dysfunction with elevated LA pressure     TIA (transient ischemic attack) 08/07/2017 Overview:     1. Admission to [facility] on 2017 with dizziness and slurred speech, CT head unremarkable, EKG: NSR, minor non-specific T wave abnormality, otherwise normal tracing  2. CTA neck 2017: Negative for significant carotid artery disease  3.  ECHO 2017: Mild CLVH, stage I diastolic dysfunction with elevated LA pressure, otherwise normal     History of total bilateral knee replacement     Renal calculi     Sacroiliitis(Notable Code), right     Inj musc/tend the rotator cuff of left shoulder, init     Surgical History    Surgery Date Site/Laterality Comments   HYSTERECTOMY         TOTAL KNEE REPLACEMENT  Bilateral Knee replacement, total     LUMPECTOMY  Left      TOTAL HIP REPLACEMENT 2015 Left Hip replacement, total     FLUOROSCOPIC GUIDANCE FOR NEEDLE PLACEMENT 2015 Left DJD Left Hip     HIP SURGERY 16 Right right total hip replacement     Medical History    Medical History Date Comments   History of total left hip replacement       History of total bilateral knee replacement       High blood pressure       Arthritis       Renal calculi       Degenerative joint disease (DJD) of hip, left       Greater trochanteric bursitis of left hip       Sacroiliitis(Notable Code), right       Impingement syndrome of left shoulder       Inj musc/tend the rotator cuff of left shoulder, init       Greater trochanteric bursitis of right hip       Diabetes mellitus ([facility] [address]       Family History    Medical History Relation Name Comments   Cancer Biological Father       Heart Disease Biological Father       Diabetes Biological Mother       Heart Disease Biological Mother       Arthritis Other Fx Hx of     Heart Disease Other Fx Hx of       Relation Name Status Comments   Biological Father        Biological Mother        Child 2 living Alive     Other Fx Hx of Other     Sibling 2 living Alive     Social History    Tobacco Use Types Packs/Day Years Used Date   Current Every Day Smoker   0.25       Smokeless Tobacco: Never Used           Tobacco Cessation: Ready to Quit: No; Counseling Given: Yes     Alcohol Use Drinks/Week oz/Week Comments   No           Sex Assigned at Birth Date Recorded   Not on file         PHYSICAL EXAM:  Vitals:  BP (!) 113/53   Pulse 88   Temp 99.5 °F (37.5 °C) (Core)   Resp 16   Ht 5' (1.524 m)   Wt 164 lb (74.4 kg)   SpO2 95%   BMI 32.03 kg/m²   General Appearance: alert and oriented to person, somewhat to place and time and in no acute distress  Skin: warm and dry, turgor not diminished  Head: normocephalic and atraumatic  Eyes: pupils equal, round, and reactive to light, extraocular eye movements intact, conjunctivae normal  Neck: neck supple and non tender without mass   Pulmonary/Chest: clear to auscultation bilaterally- no wheezes, rales or rhonchi, normal air movement, no respiratory distress  Cardiovascular: normal rate, normal S1 and S2 and no M/R/R  Abdomen: soft, non-tender, non-distended, normal bowel sounds, no masses or organomegaly  Extremities: no cyanosis, no clubbing and no edema. Bilateral tenderness lower extremities  Neurologic: no cranial nerve deficit and speech normal    LABS:  Recent Labs     05/25/21  1455 05/25/21  1701   *  --    K 5.3* 4.0   CL 95*  --    CO2 14*  --    BUN 14  --    CREATININE 1.3*  --    GLUCOSE 169*  --    CALCIUM 9.0  --        Recent Labs     05/25/21  1455   WBC 7.0   RBC 4.15   HGB 9.4*   HCT 32.1*   MCV 77.3*   MCH 22.7*   MCHC 29.3*   RDW 17.1*   *   MPV 10.4       No results for input(s): POCGLU in the last 72 hours. Radiology:   CTA CHEST W CONTRAST   Final Result   1. There are limitation on the present study due motion artifacts caused   misregistration of the images as above discussed. 2.  However there appears to be signs for acute/subacute pulmonary embolus in   the lower lobe pulmonary arteries more noticeable on the right lower lobe as   above commented. Preliminary report given to Dr. Sara Malone, ER physician borderline. CT HEAD WO CONTRAST   Final Result   No acute intracranial abnormality. Periventricular white matter changes consistent chronic microvascular   disease. Diffuse volume loss. XR CHEST PORTABLE   Final Result   Interstitial opacities in perihilar locations could suggest subsegmental   atelectasis or bronchopneumonia. Short-term follow-up may be helpful for   further evaluation. CT ABDOMEN PELVIS WO CONTRAST Additional Contrast? None    (Results Pending)       EKG: No acute normality    ASSESSMENT:    Septic shock (HCC)  Pulmonary embolism  Acute respiratory failure with hypoxia  Acute kidney injury: Creatinine 0.9 earlier this year  Lactic acidosis  Hyponatremia  Microcytic anemia  Diabetes mellitus type 2  Essential hypertension  Grade 1 diastolic heart failure    PLAN:  Septic shock: No readily apparent source. ? UTI.   -Check CT abdomen for further evaluation. Emboli to lungs? Septic, check echo   -Vanco and Zosyn.   -Continue with pressors, IV fluids  -Solu-Cortef  -Intensivist consulted in ED  -Trend lactic acid IV fluids    Pulmonary embolism on CAT scan with hypoxia:  -Anticoagulation, Doppler of lower extremities  -Echo    Diabetes mellitus type 2: Basal/bolus with SSI    Microcytic anemia: Check iron panel    Hypertension: on pressors    Code Status: Chemical code. DNR CC for nursing home but she expressed limited code. Consult palliative care  DVT prophylaxis: lovenox. NOTE: This report was transcribed using voice recognition software. Every effort was made to ensure accuracy; however, inadvertent computerized transcription errors may be present.   Electronically signed by Bari Essex, MD on 5/25/2021 at 9:40 PM

## 2021-05-26 NOTE — PROCEDURES
Arterial Line Placement Procedure Note                     Indication: shock    Consent: The patient provided verbal consent for this procedure. Ezequiel's Test: Normal    Procedure: The skin over the right radial artery was prepped with betadine and draped in a sterile fashion. Local anesthesia was obtained by infiltration using 1% Lidocaine without epinephrine. A 20 gauge arterial line catheter was then inserted, using a modified Seldinger technique, into the vessel. The transducer set was then attached and securely fastened to the skin with sutures. Waveforms on the monitor were observed and found to be adequate. The patient had good distal perfusion after the procedure. The site was then dressed in a sterile fashion. The patient tolerated the procedure well.      Complications: None    Brodie Amaya DO PGY1

## 2021-05-26 NOTE — DISCHARGE INSTR - COC
Continuity of Care Form    Patient Name: Dorisann Peabody   :  1945  MRN:  [id number]    Admit date:  2021  Discharge date:  6/3/2021    Code Status Order: Limited   Advance Directives:   [facility] Documentation     Date/Time Healthcare Directive Type of Healthcare Directive Copy in [address] Agent's Name Healthcare Agent's Phone Number    [phone]  Yes, patient has an advance directive for healthcare treatment  Health care treatment directive  Yes, copy in chart  --  --  --          Admitting Physician:  Deepa Diggs MD  PCP: Danie Farias MD    Discharging Nurse: Dez Colin RN   [address] Unit/Room#: 0207/0207-A  Discharging Unit Phone Number: [phone]    Emergency Contact:   Extended Emergency Contact Information  Primary Emergency Contact: Frockadvisor Ovens  Home Phone: [phone]  Mobile Phone: [phone]  Relation: Child   needed? No    Past Surgical History:  Past Surgical History:   Procedure Laterality Date    HYSTERECTOMY      JOINT REPLACEMENT      JOINT REPLACEMENT Bilateral     JOINT REPLACEMENT Bilateral     TONSILLECTOMY         Immunization History: There is no immunization history on file for this patient.     Active Problems:  Patient Active Problem List   Diagnosis Code    Septic shock ([facility] A41.9, R65.21       Isolation/Infection:   Isolation          No Isolation        Patient Infection Status     Infection Onset Added Last Indicated Last Indicated By Review Planned Expiration Resolved Resolved By    None active    Resolved    COVID-19 Rule Out 21 COVID-19, Rapid (Ordered)   21 Rule-Out Test Resulted          Nurse Assessment:  Last Vital Signs: BP (!) 100/42   Pulse 87   Temp 101.7 °F (38.7 °C) (Bladder)   Resp 19   Ht 5' (1.524 m)   Wt 165 lb 12.6 oz (75.2 kg)   SpO2 95%   BMI 32.38 kg/m²     Last documented pain score (0-10 scale): Pain Level: 10  Last Weight:   Wt Readings from Last 1 Encounters:   05/26/21 165 lb 12.6 oz (75.2 kg)     Mental Status:  oriented and alert    IV Access:  - Peripheral IV - site  L Upper Arm, insertion date: 5/28/2021    Nursing Mobility/ADLs:  Walking   Dependent  Transfer  Dependent  Bathing  Dependent  Dressing  Dependent  Toileting  Dependent  Feeding  Assisted  Med Admin  Assisted  Med Delivery   whole    Wound Care Documentation and Therapy:        Elimination:  Continence:   · Bowel: No  · Bladder: No  Urinary Catheter: Insertion Date: 5/25/2021   Colostomy/Ileostomy/Ileal Conduit: No       Date of Last BM: 6/2/2021    Intake/Output Summary (Last 24 hours) at 5/26/2021 0837  Last data filed at 5/26/2021 0618  Gross per 24 hour   Intake 1545 ml   Output 775 ml   Net 770 ml     I/O last 3 completed shifts: In: 9449 [I.V.:233; IV Piggyback:1312]  Out: 095 [Urine:775]    Safety Concerns: At Risk for Falls    Impairments/Disabilities:      None    Nutrition Therapy:  Current Nutrition Therapy:   - Oral Diet:  Carb Control 4 carbs/meal (1800kcals/day) and Dental Soft    Routes of Feeding: Oral  Liquids: No Restrictions  Daily Fluid Restriction: no  Last Modified Barium Swallow with Video (Video Swallowing Test): not done    Treatments at the Time of Hospital Discharge:   Respiratory Treatments: ***  Oxygen Therapy:  is on oxygen at 13 L/min per nasal cannula.   Ventilator:    - No ventilator support    Rehab Therapies: Physical Therapy and Occupational Therapy  Weight Bearing Status/Restrictions: No weight bearing restirctions  Other Medical Equipment (for information only, NOT a DME order):  wheelchair and hospital bed  Other Treatments: ***    Patient's personal belongings (please select all that are sent with patient):  None    RN SIGNATURE:  Electronically signed by Sumi Cochran RN on 6/3/21 at 4:32 PM EDT    CASE MANAGEMENT/SOCIAL WORK SECTION    Inpatient Status Date: ***    Readmission Risk Assessment Score:  Readmission Risk              Risk of Unplanned Readmission:  17           Discharging to Facility/ Agency   · Name: [facility]  Address:   [facility] 7th floor, [facility] [address]         Phone: [phone]       Fax: [phone]        Dialysis Facility (if appliable)   · Name:  · Address:  · Dialysis Schedule:  · Phone:  · Fax:    / signature: Electronically signed by Mark Sanderson on 5/26/21 at 8:37 AM EDT    PHYSICIAN SECTION    Prognosis: {Prognosis:6461580433}    Condition at Discharge: Stable    Rehab Potential (if transferring to Rehab): {Prognosis:0513352482}    Recommended Labs or Other Treatments After Discharge: ***    Physician Certification: I certify the above information and transfer of Avril Sauceda  is necessary for the continuing treatment of the diagnosis listed and that she requires LTAC for less 30 days.      Update Admission H&P: {CHP DME Changes in UCVAS:[id number]}    PHYSICIAN SIGNATURE:  {Esignature:[id number]}

## 2021-05-26 NOTE — CARE COORDINATION
Social Work discharge planning   SW consult \"home going\" noted. Pt is from [place]. Await contact with SNF liaison to check bed hold status. Electronically signed by Rayray Martinze on 5/26/2021 at 8:35 AM     Addendum-    Per Kim Simple with [place], pt was thee for snf skilled rehab. Her bed is not held. However, she can return if bed open and if meets criteria. BO spoke to pt's son Haresh Garza [phone], who advised pt had Covid back in December 2020. He said she was in a SNF in [place] for about a month. Haresh Garza said his niece works at [employer] [facility] [place]. Haresh Goodmaner said plan will be to return for more rehab at [facility] at discharge. Social Work to follow for support and discharge planning with CM.   Electronically signed by Rayray Martinez on 5/26/2021 at 10:31 AM

## 2021-05-26 NOTE — CONSULTS
[address] [place] [address] [facility]  Consultation Note     Admit Date: 5/25/2021  2:10 PM    Reason for Consult:   Sepsis    Attending Physician:  Nazia Marks MD    HISTORY OF PRESENT ILLNESS:             The history is obtained from extensive review of available past medical records. 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 She is seen [facility] patient was found on her driveway unconscious and had been admitted to [facility]. From there she went to a nursing facility. Presents to the ED on 5/25/2021 from [address] [place] [address] because of fever and concern for infection. Upon arrival she was hypotensive, febrile and tachycardic. She was placed on vasopressors and admitted to the ICU. She was treated with Vancomycin and Cefepime. Blood cultures are showing gram-negative rods. ID was asked to see her in consultation. On admission her white count was normal but it has jumped up to 28,000. She was given steroids.     Past Medical History:        Diagnosis Date    Anemia     Angina pectoris (HCC)     Anxiety     [facility]     COVID-19     Dementia (HCC)     Diabetes mellitus ([facility]     Hypertension     Insomnia     Muscle weakness     SONIYA (obstructive sleep apnea)     Pneumonia due to COVID-19 virus     Respiratory failure (HCC)     TIA (transient ischemic attack)      Past Surgical History:        Procedure Laterality Date    HYSTERECTOMY      JOINT REPLACEMENT      JOINT REPLACEMENT Bilateral     JOINT REPLACEMENT Bilateral     TONSILLECTOMY       Current Medications:   Scheduled Meds:   cefepime  1,000 mg Intravenous Q12H    pantoprazole  40 mg Intravenous QAM AC    And    sodium chloride (PF)  10 mL Intravenous Daily    sodium chloride  250 mL Intravenous Once    enoxaparin  1 mg/kg Subcutaneous Q12H    hydrocortisone sodium succinate PF  100 mg Intravenous Q8H    insulin lispro  0-6 Units Subcutaneous TID     insulin lispro  0-3 Units Subcutaneous Nightly    vancomycin  1,000 mg Intravenous Q24H     Continuous Infusions:   norepinephrine 35 mcg/min (05/26/21 0616)    sodium chloride      dextrose      sodium chloride 150 mL/hr at 05/26/21 0752     PRN Meds:melatonin, hydrOXYzine, fentanNYL, LORazepam, sodium chloride flush, sodium chloride, acetaminophen **OR** acetaminophen, glucose, dextrose, glucagon (rDNA), dextrose, perflutren lipid microspheres    Allergies:  Morphine and Penicillins    Social History:   Social History     Socioeconomic History    Marital status:      Spouse name: Not on file    Number of children: Not on file    Years of education: Not on file    Highest education level: Not on file   Occupational History    Not on file   Tobacco Use    Smoking status: Never Smoker    Smokeless tobacco: Never Used   Substance and Sexual Activity    Alcohol use: Never    Drug use: Never    Sexual activity: Not on file   Other Topics Concern    Not on file   Social History Narrative    Not on file     Social Determinants of Health     Financial Resource Strain:     Difficulty of Paying Living Expenses:    Food Insecurity:     Worried About Running Out of Food in the Last Year:     [address] in the Last Year:    Transportation Needs:     Lack of Transportation (Medical):      Lack of Transportation (Non-Medical):    Physical Activity:     Days of Exercise per Week:     Minutes of Exercise per Session:    Stress:     Feeling of Stress :    Social Connections:     Frequency of Communication with Friends and Family:     Frequency of Social Gatherings with Friends and Family:     Attends Hinduism Services:     Active Member of Clubs or Organizations:     Attends Club or Organization Meetings:     Marital Status:    Intimate Partner Violence:     Fear of Current or Ex-Partner:     Emotionally Abused:     Physically Abused:     Sexually Abused:       Pets: Dog  Travel: No  The patient was living at home with her granddaughter and great grand children. She is a retired nurse from [address] [place] [address],  [address] History:   No family history on file. . Otherwise non-pertinent to the chief complaint. REVIEW OF SYSTEMS:    Constitutional: Negative for fevers, chills, diaphoresis  Neurologic: Negative   Psychiatric: Negative  Rheumatologic: Negative   Endocrine: Negative  Hematologic: Negative  Immunologic: Negative  ENT: Negative  Respiratory: Negative   Cardiovascular: Negative  GI: Negative  : She reports having UTIs in the past.  Musculoskeletal: Bilateral [facility]  Skin: No rashes. PHYSICAL EXAM:    Vitals:   BP (!) 100/42   Pulse 87   Temp 101.7 °F (38.7 °C) (Bladder)   Resp 19   Ht 5' (1.524 m)   Wt 165 lb 12.6 oz (75.2 kg)   SpO2 95%   BMI 32.38 kg/m²   Constitutional: The patient is awake, alert, and oriented. Lying in bed in the ICU. She is in no distress. Skin: Warm and dry. No rashes were noted. HEENT: Eyes show round, and reactive pupils. No jaundice. Moist mucous membranes, no ulcerations, no thrush. Neck: Supple to movements. No lymphadenopathy. Chest: No use of accessory muscles to breathe. Symmetrical expansion. Auscultation reveals no wheezing, crackles, or rhonchi. Cardiovascular: S1 and S2 are rhythmic and regular. No murmurs appreciated. Abdomen: Positive bowel sounds to auscultation. Benign to palpation. No masses felt. No hepatosplenomegaly. Extremities: No clubbing, no cyanosis, no edema. Lines: Right femoral TLC 5/25/2021. Bilateral TKR surgical wound healed. No effusion. Blair catheter with clear urine.     CBC+dif:  Recent Labs     05/25/21  1455 05/26/21  0610   WBC 7.0 28.6*   HGB 9.4* 7.9*   HCT 32.1* 26.5*   MCV 77.3* 77.9*   * 385   NEUTROABS 6.30 26.88*     No results found for: CRP   No results found for: CRPHS  No results found for: SEDRATE  Lab Results   Component Value Date    ALT 10 05/26/2021    AST 14 05/26/2021    ALKPHOS 78 05/26/2021    BILITOT 0.3 05/26/2021     Lab Results   Component Value Date     05/26/2021    K 4.2 05/26/2021     05/26/2021    CO2 14 05/26/2021    BUN 17 05/26/2021    CREATININE 1.4 05/26/2021    GFRAA 44 05/26/2021    LABGLOM 36 05/26/2021    GLUCOSE 160 05/26/2021    PROT 6.3 05/26/2021    LABALBU 2.7 05/26/2021    CALCIUM 8.0 05/26/2021    BILITOT 0.3 05/26/2021    ALKPHOS 78 05/26/2021    AST 14 05/26/2021    ALT 10 05/26/2021       No results found for: PROTIME, INR    No results found for: TSH    Lab Results   Component Value Date    COLORU Yellow 05/25/2021    PHUR 5.0 05/25/2021    WBCUA 5-10 05/25/2021    RBCUA 5-10 05/25/2021    BACTERIA MODERATE 05/25/2021    CLARITYU Clear 05/25/2021    SPECGRAV 1.020 05/25/2021    LEUKOCYTESUR SMALL 05/25/2021    UROBILINOGEN 0.2 05/25/2021    BILIRUBINUR Negative 05/25/2021    BLOODU TRACE-INTACT 05/25/2021    GLUCOSEU Negative 05/25/2021       No results found for: HCO3, BE, O2SAT, PH, THGB, PCO2, PO2, TCO2  Radiology:  Reviewed    Microbiology:  Pending  Recent Labs     05/25/21  1455   BC Gram stain performed from blood culture bottle media  Gram negative rods  *     No results for input(s): ORG in the last 72 hours. Recent Labs     05/25/21  1516   BLOODCULT2 Gram stain performed from blood culture bottle media  Gram negative rods  *     No results for input(s): STREPNEUMAGU in the last 72 hours. No results for input(s): LP1UAG in the last 72 hours. No results for input(s): ASO in the last 72 hours. No results for input(s): CULTRESP in the last 72 hours. No results for input(s): PROCAL in the last 72 hours.     Assessment:  · Probable complicated UTI with sepsis  · Klebsiella septicemia associated to the above  · Septic shock associated to complicated UTI with sepsis  · Leukocytosis secondary to sepsis and steroids  · Fever secondary to complicated UTI    Plan:    · Ertapenem 1 g IV x1  · Continue Cefepime  · Stop Vancomycin  · Check cultures, baseline ESR, CRP  · Monitor labs  · Will follow with you    Thank you for having us see this patient in consultation. I will be discussing this case with the treating physicians.     Sadi Marquez MD  9:28 AM  5/26/2021

## 2021-05-26 NOTE — PROGRESS NOTES
Pharmacy Consultation Note  (Antibiotic Dosing and Monitoring)    Initial consult date: 5/25/21  Consulting physician: Dr Kt Lanier  Drug: Vancomycin  Indication: Sepsis    Age/  Gender Height Weight IBW Dosing weight  Allergy Information   75 y.o./female @FLOW(11:first:1)@ @FLOW[14:FIRST:1@     Ideal body weight: 45.5 kg (100 lb 4.9 oz)  Adjusted ideal body weight: 57.1 kg (125 lb 12.6 oz)  74.4kg  Patient has no allergy information on record. @BWUU(00)@        Date  WBC BUN SCr CrCl  (mL/min) Drug/Dose Time   Given Level(s)   (Time) Comments   5/25/21 7.0 14 1.3  33   Vancomycin 1500 mg IV  1907, 5/25/21                                            Intake/Output Summary (Last 24 hours) at 5/25/2021 2210  Last data filed at 5/25/2021 1806  Gross per 24 hour   Intake 90 ml   Output --   Net 90 ml       Historical Cultures:  No results found for: ORG  No results for input(s): BC in the last 72 hours. Cultures:  available culture and sensitivity results were reviewed in EPIC    Assessment:  66 y.o. female has been initiated Vancomycin. Estimated CrCl = 33 mL/min  Goal trough level = 15-20 mcg/mL    Plan:   Will initiate vancomycin at a dose of 1000 mg q24h  Monitor renal function   Clinical pharmacy to follow      JABARI Garcia [facility] 5/25/2021 10:10 PM

## 2021-05-26 NOTE — PROGRESS NOTES
Shelly Macias Hospitalist   Progress Note    Admitting Date and Time: 5/25/2021  2:10 PM  Admit Dx: Septic shock ([facility] [A41.9, R65.21]     Seen for follow on multiple problems as listed below. Subjective:  Comfortable in bed , In ICU , denies CP , n/v/ abd pain. Continues with sob & hypoxia & requires O2 4 L NC . Over all feels better c/f admission    ROS: denies fever, chills, cp, n/v, HA unless stated above.      pantoprazole  40 mg Intravenous QAM AC    And    sodium chloride (PF)  10 mL Intravenous Daily    cefepime  2,000 mg Intravenous Q12H    ertapenem (INVanz) IVPB  1,000 mg Intravenous Once    sodium chloride  1,000 mL Intravenous Once    sodium chloride  250 mL Intravenous Once    enoxaparin  1 mg/kg Subcutaneous Q12H    hydrocortisone sodium succinate PF  100 mg Intravenous Q8H    insulin lispro  0-6 Units Subcutaneous TID WC    insulin lispro  0-3 Units Subcutaneous Nightly     melatonin, 3 mg, Nightly PRN  hydrOXYzine, 25 mg, TID PRN  fentanNYL, 25 mcg, Q2H PRN  LORazepam, 0.5 mg, Q6H PRN  sodium chloride flush, 5-40 mL, PRN  sodium chloride, 25 mL, PRN  acetaminophen, 650 mg, Q6H PRN   Or  acetaminophen, 650 mg, Q6H PRN  glucose, 15 g, PRN  dextrose, 12.5 g, PRN  glucagon (rDNA), 1 mg, PRN  dextrose, 100 mL/hr, PRN  perflutren lipid microspheres, 1.5 mL, ONCE PRN         Objective:    BP (!) 100/42   Pulse 87   Temp 101.7 °F (38.7 °C) (Bladder)   Resp 19   Ht 5' (1.524 m)   Wt 165 lb 12.6 oz (75.2 kg)   SpO2 95%   BMI 32.38 kg/m²   General Appearance: alert and oriented to person, place and time, in no overt  distress  Skin: warm and dry  Head: normocephalic and atraumatic  Eyes: pupils equal, round, and reactive to light, extraocular eye movements intact, conjunctivae normal  Neck: supple and non-tender without mass  Pulmonary/Chest: clear to auscultation bilaterally  Cardiovascular: normal rate, regular rhythm, normal S1 and S2  Abdomen: soft, non-tender, could suggest subsegmental   atelectasis or bronchopneumonia. Short-term follow-up may be helpful for   further evaluation. Assessment:    Active Problems:    Septic shock (HCC)  Resolved Problems:    * No resolved hospital problems. *      Plan:  Septic shock/ Klebsiella bacteremia  - On IV Vasopressors , solucortef, IVF . Intensivist consulted from er. On IV cefepime + Ertapenem. ID is consulted. Lactic acidosis - On IVf , monitor    GALEN - On IVF, moniotr    Possible UTI - on IV cefepime , Ucx pending. PE as per CTA- on full dose lovenox. CC following. DM II - on basal / bolus/ ISS     Microcytic anemia - monitor    HTN by hx  - with septic shock  , on vasopressors and others as above , monitor. Dementia - resume as per home    On lovenox for dvt prophy    Limited code   Palliative care is consulted.           Electronically signed by Elenora Schwab, MD on 5/26/2021 at 11:48 AM

## 2021-05-26 NOTE — PROGRESS NOTES
CI HR MAP TPRI SVI TFC   Baseline 3.1 85 74 1922 36 44.9   Test 3.6 84 81 1801 44 45.0   % Change 16.9 -0.8 9.5 -6.3 22.6 0.3   noninvasive -  Start    Dr. Merlin Benito notified. Order obtained.

## 2021-05-26 NOTE — CONSULTS
Nebulization BID    budesonide  0.5 mg Nebulization BID    ipratropium-albuterol  1 ampule Inhalation Q4H WA    insulin lispro  0-12 Units Subcutaneous TID WC    insulin lispro  0-6 Units Subcutaneous Nightly    lactated ringers bolus  1,000 mL Intravenous Once    sodium chloride  250 mL Intravenous Once    enoxaparin  1 mg/kg Subcutaneous Q12H    hydrocortisone sodium succinate PF  100 mg Intravenous Q8H     melatonin, hydrOXYzine, fentanNYL, LORazepam, sodium chloride flush, sodium chloride, acetaminophen **OR** acetaminophen, glucose, dextrose, glucagon (rDNA), dextrose, perflutren lipid microspheres  IV Drips/Infusions   sodium chloride      IV infusion builder 150 mL/hr at 05/26/21 1358    norepinephrine 25 mcg/min (05/26/21 1421)    sodium chloride      dextrose       Home Medications  Medications Prior to Admission: insulin glargine (BASAGLAR KWIKPEN) 100 UNIT/ML injection pen, Inject 15 Units into the skin nightly  famotidine (PEPCID) 20 MG tablet, Take 20 mg by mouth daily  ferrous sulfate (IRON 325) 325 (65 Fe) MG tablet, Take 325 mg by mouth daily (with breakfast)  escitalopram (LEXAPRO) 5 MG tablet, Take 10 mg by mouth daily  Umeclidinium Bromide (INCRUSE ELLIPTA) 62.5 MCG/INH AEPB, Inhale 1 actuation into the lungs daily  clopidogrel (PLAVIX) 75 MG tablet, Take 75 mg by mouth daily  magnesium oxide (MAG-OX) 400 MG tablet, Take 400 mg by mouth 2 times daily  metFORMIN (GLUCOPHAGE) 500 MG tablet, Take 500 mg by mouth 2 times daily (with meals)  albuterol sulfate HFA (VENTOLIN HFA) 108 (90 Base) MCG/ACT inhaler, Inhale 2 puffs into the lungs every 8 hours  loperamide (IMODIUM) 2 MG capsule, Take 2 mg by mouth every 6 hours as needed for Diarrhea  meclizine (ANTIVERT) 25 MG tablet, Take 25 mg by mouth every 8 hours as needed for Nausea  aluminum & magnesium hydroxide-simethicone (MAALOX) 200-200-20 MG/5ML SUSP suspension, Take 20 mLs by mouth 4 times daily as needed for Indigestion  acetaminophen (TYLENOL) 325 MG tablet, Take 325 mg by mouth every 6 hours as needed for Pain  fluticasone-salmeterol (WIXELA INHUB) 250-50 MCG/DOSE AEPB, Inhale 1 puff into the lungs every 12 hours  hydrOXYzine (VISTARIL) 25 MG capsule, Take 25 mg by mouth 3 times daily as needed for Anxiety  donepezil (ARICEPT) 5 MG tablet, Take 5 mg by mouth nightly  atorvastatin (LIPITOR) 40 MG tablet, Take 40 mg by mouth daily    Diet/Nutrition   DIET CARB CONTROL; Allergies   Morphine and Penicillins    Social History   Tobacco   reports that she has never smoked. She has never used smokeless tobacco.    Alcohol     reports no history of alcohol use. Family History   No family history on file. ROS     REVIEW OF SYSTEMS:   CONSTITUTIONAL:  negative for  fevers and chills  EYES:  negative for  double vision and visual disturbance  HEENT:  negative for  epistaxis, sore mouth and sore throat  RESPIRATORY:  negative except for  dyspnea  CARDIOVASCULAR:  negative except for  chest pain  GASTROINTESTINAL:  negative for nausea, vomiting and diarrhea  GENITOURINARY:  negative for urinary incontinence and hematuria  INTEGUMENT/BREAST:  negative for rash and skin lesion(s)  MUSCULOSKELETAL:  negative except for  myalgias  NEUROLOGICAL:  negative for weakness and numbness  BEHAVIOR/PSYCH:  positive for anxiety    Lines and Devices    CVC   PIV L Hand  PIV R AC    Mechanical Ventilation Data   VENT SETTINGS (Comprehensive)  Vent Information  SpO2: 95 %  Additional Respiratory  Assessments  Pulse: 87  Resp: 22  SpO2: 95 %    ABG  No results found for: PH, PCO2, PO2, HCO3, O2SAT  No results found for: IFIO2, MODE, SETTIDVOL, SETPEEP        Vitals    height is 5' (1.524 m) and weight is 165 lb 12.6 oz (75.2 kg). Her bladder temperature is 101.7 °F (38.7 °C). Her blood pressure is 100/42 (abnormal) and her pulse is 87. Her respiration is 22 and oxygen saturation is 95%.        Temperature Range: Temp: 101.7 °F (38.7 °C) Temp  Av.6 °F (38.1 °C)  Min: oriented, normal speech, no focal findings or movement disorder noted}  Extremities - peripheral pulses normal, no pedal edema, no clubbing or cyanosis  Skin - normal coloration and turgor, no rashes, no suspicious skin lesions noted     Data   Old records and images have been reviewed    Lab Results   CBC     Lab Results   Component Value Date    WBC 28.6 05/26/2021    RBC 3.40 05/26/2021    HGB 7.9 05/26/2021    HCT 26.5 05/26/2021     05/26/2021    MCV 77.9 05/26/2021    MCH 23.2 05/26/2021    MCHC 29.8 05/26/2021    RDW 17.3 05/26/2021    NRBC 0.0 05/26/2021    METASPCT 11.5 05/26/2021    LYMPHOPCT 2.6 05/26/2021    MONOPCT 1.8 05/26/2021    MYELOPCT 6.2 05/26/2021    BASOPCT 0.9 05/26/2021    MONOSABS 0.57 05/26/2021    LYMPHSABS 0.86 05/26/2021    EOSABS 0.26 05/26/2021    BASOSABS 0.26 05/26/2021       BMP   Lab Results   Component Value Date     05/26/2021    K 4.8 05/26/2021    K 4.2 05/26/2021     05/26/2021    CO2 15 05/26/2021    BUN 19 05/26/2021    CREATININE 1.3 05/26/2021    GLUCOSE 222 05/26/2021    CALCIUM 7.6 05/26/2021       LFTS  Lab Results   Component Value Date    ALKPHOS 78 05/26/2021    ALT 10 05/26/2021    AST 14 05/26/2021    PROT 6.3 05/26/2021    BILITOT 0.3 05/26/2021    LABALBU 2.7 05/26/2021       INR  No results for input(s): PROTIME, INR in the last 72 hours. APTT  No results for input(s): APTT in the last 72 hours. Lactic Acid  Lab Results   Component Value Date    LACTA 5.4 05/26/2021    LACTA 4.0 05/26/2021    LACTA 4.8 05/26/2021        BNP   No results for input(s): BNP in the last 72 hours. Cultures     Recent Labs     05/25/21  1455   BC Gram stain performed from blood culture bottle media  Gram negative rods  *     Recent Labs     05/25/21  1516   BLOODCULT2 Gram stain performed from blood culture bottle media  Gram negative rods  *       No results for input(s): LABURIN in the last 72 hours.           Radiology   CXR  Impression   Interstitial opacities in perihilar locations could suggest subsegmental   atelectasis or bronchopneumonia. Short-term follow-up may be helpful for   further evaluation. CT Scans  CTA Chest  Impression   1. There are limitation on the present study due motion artifacts caused   misregistration of the images as above discussed. 2.  However there appears to be signs for acute/subacute pulmonary embolus in   the lower lobe pulmonary arteries more noticeable on the right lower lobe as   above commented. CT Head  Impression   No acute intracranial abnormality. Periventricular white matter changes consistent chronic microvascular   disease. Diffuse volume loss. CT ABD PEL  Impression   Large gallstones in the dependent portion of the gallbladder. Mostly hypodense and homogeneous right adrenal nodule measuring 2.8 cm in   diameter, probable adenoma. Further evaluation with adrenal washout CT or   chemical shift MRI recommended. Other chronic or incidental findings as noted. SYSTEMS ASSESSMENT    Neuro   Neuro Intact  Continue to monitor     Respiratory   Acute Respiratory failure with hypoxia  5L O2 NC  Brovana 15mcg BID  Pulmicort 500mcg BID  Duoneb q4H  Wean oxygen as tolerated.  Keep O2 sat 90-92%    Bilateral PE  Lovenox 1mg/kg  DVTs absent in lower extrmeities    Cardiovascular   Shock, Spesis  30mL/kg Bolus given  Levo 10mcg  Continue to wean as tolerated  SoluCortef 100mg q8H    Gastrointestinal   GI Prophylaxis 40mg Daily  Hemeoccult positive  GI Consulted, appreciate recs    Renal   GALEN, Cr 1.4  Maintenance fluids 150mL/H  Continue to monitor    Low Bicarb, Bicarb drip started     Infectious Disease   Sepsis  Blood cultures positive for gram negative rods  Urine Cultures pending  WBC 28.6   Lactate 5.4  Cefepime 1g q12H  Vanc  Echo Pending  ID Consulted, appreciate recs    Hematology/Oncology   Anemia, 7.9  Continue to monitor and replace if <7    Endocrine     T2DM, BG 160  MDSSI    Social/Spiritual/DNR/Other   Patient is a limited code  Palliative Care consulted  Fentanyl for chronic pain  Ativan for anxiety      Katerine Irving, DO  PGY -1    5/26/2021  3:12 PM    Patient was seen and evaluated by both myself and Olive Hall MD    I personally saw, examined and provided care for the patient. Radiographs, labs and medication list were reviewed by me independently. I spoke with bedside nursing, therapists and consultants. Critical care services and times documented are independent of procedures and multidisciplinary rounds with Residents. Additionally comprehensive, multidisciplinary rounds were conducted with the MICU team. The case was discussed in detail and plans for care were established. Review of Residents documentation was conducted and revisions were made as appropriate. I agree with the above documented exam, problem list and plan of care. Pe is very small and subsegemental   Abx   Ivf   Wean pressors as tolerated     Olive Hall M.D.    Pulmonary/Critical Care Medicine   39 min cct excluding procedures

## 2021-05-26 NOTE — CONSULTS
Palliative Care Department  Palliative Care Initial Consult  Provider: Demetria Thompson, APRN - CNP  APRN-CNP  [address] Day: 2  Date of Initial Consult: 5/26/2021  Referring Provider: Dr. Lanie Siegel was consulted for assistance with: Code status Discussion    Chief Complaint: Toshia Garza is a 66 y.o. female with chief complaint of fever, concern of blood infection    HPI:   Toshia Garza is a 66 y.o. female with significant medical history of dementia, TIA, previous covid pneumonia, who was admitted on 5/25/2021 for concern of bacteremia. Blood cultures positive, fever in the ED, as well as hypoxia and hypotension. Admitted to the ICU for further management. She has a DNR at the SNF and Palliative is asked to clarify code status. ASSESSMENT/PLAN:     Pertinent Hospital Diagnoses:  Current medical issues leading to Palliative Medicine involvement include   Active Hospital Problems    Diagnosis Date Noted    Septic shock ([facility] [address] [A41.9, R65.21] 05/25/2021     Palliative Care Encounter / Counseling Regarding Goals of Care:  Please see detailed goals of care discussion as below.  At this time, Toshia Garza, Does have capacity for medical decision-making. Capacity is time limited and situation/question specific.  Outcome of goals of care meeting: continue current management  o Code status: DNR-CCA/No Intubation   Advanced Directives: None   Surrogate/Legal Lilibeth Elizondo () is the son  · There are no further PM needs at this time. PM will now sign off. If new PM needs arise, please re-consult. Thank you. Referrals: none today    SUBJECTIVE:   Events/Discussions:  5/26/2021:  Olla Prader seen at the bedside, no family present. She is alert, oriented, able to voice her needs concerns well. Introduced myself, palliative medicine service, and we discussed her goals of care and CODE STATUS.   She clearly states that in the event of cardiac arrest she would not wish to have CPR performed, and she also states that in the event she were to have worsening respiratory failure, under the circumstances would she be agreeable to be intubated and have mechanical ventilation. She does states she is agreeable to supplemental oxygen, as well as use of a noninvasive ventilator or BiPAP to support her breathing. She also states that she wishes to continue with full supportive care regarding her current acute illnesses, would like to continue with vasopressors for hypotension, antibiotics to treat her bacterial infection, as well as any other supportive medical treatments which will manage her acute illnesses. Discussed the different DNR status is available in the state of [place], she is agreeable to CODE STATUS of DNR CCA with no intubation. Past Medical History:   Diagnosis Date    Anemia     Angina pectoris ([facility])     Anxiety     COPD [facility]chronic obstructive [facility] disease) [facility] [address]     COVID-19     Dementia ([facility])     Diabetes mellitus ([facility] [address]     Hypertension     Insomnia     Muscle weakness     SONIYA (obstructive sleep apnea)     Pneumonia due to COVID-19 virus     Respiratory failure (HCC)     TIA (transient ischemic attack)        Past Surgical History:   Procedure Laterality Date    HYSTERECTOMY      JOINT REPLACEMENT      JOINT REPLACEMENT Bilateral     JOINT REPLACEMENT Bilateral     TONSILLECTOMY         No family history on file. Allergies   Allergen Reactions    Morphine     Penicillins Rash       ROS: UNLESS STATED ABOVE PATIENT DENIES:  CONSTITUTIONAL:  fever, chill, rigors, nausea, vomiting, fatigue. HEENT: blurry vision, double vision, hearing problem, tinnitus, hoarseness, dysphagia, odynophagia  RESPIRATORY: cough, shortness of breath, sputum expectoration. CARDIOVASCULAR:  Chest pain/pressure, palpitation, syncope, irregular beats  GASTROINTESTINAL:  abdominal or rectal pain, diarrhea, constipation, .   GENITOURINARY:  Burning, frequency, urgency, incontinence, discharge  INTEGUMENTARY: rash, wound, pruritis  HEMATOLOGIC/LYMPHATIC:  Swelling, sores, gum bleeding, easy bruising, pica. MUSCULOSKELETAL:  pain, edema, joint swelling or redness  NEUROLOGICAL:  light headed, dizziness, loss of consciousness, weakness, change in memory, seizures, tremors    OBJECTIVE:   Prognosis: unknown    Physical Exam:  BP (!) 100/42   Pulse 87   Temp 101.7 °F (38.7 °C) (Bladder)   Resp 19   Ht 5' (1.524 m)   Wt 165 lb 12.6 oz (75.2 kg)   SpO2 95%   BMI 32.38 kg/m²     Gen:  Alert, chronically ill-appearing, in no acute distress  HEENT:  Normocephalic, conjunctiva pink, no drainage, mucosa moist  Neck:  Supple  Lungs:  CTA bilaterally, no audible rhonchi or wheezes noted  Heart: RRR, no murmur, rub, or gallop noted during exam  Abd:  Soft, non tender, non distended, BS+  M/S/Ext:  Moving all extremities, weak, no edema, pulses present  Skin:  Warm and dry  Neuro:  PERRL, Alert, oriented x 3; following commands    Objective data reviewed: labs, images, records, medication use, vitals and chart    Time/Communication:  Greater than 50% of time spent, total 30 minutes in counseling and coordination of care at the bedside regarding goals of care and see above. Leisa Benito, APRN - CNP APRN-CNP  Palliative Medicine    Patient and the plan of care discussed with the other IDT members of Palliative Care Team, and with patient and floor nurse, as appropriate and available. Thank you for allowing Palliative Medicine to participate in the care of Shayan Mari. Note: This report was completed using [email or web address] voiced recognition software. Every effort has been made to ensure accuracy; however, inadvertent computerized transcription errors may be present.

## 2021-05-26 NOTE — PROGRESS NOTES
Patient admitted from ER to 207, with the following belongings none, placed on monitor, patient oriented to room and unit visiting hours. Patient guide at bedside, reviewed patient rights and responsibilities. MRSA nasal swab obtained. Bed alarm on. Call light within reach.

## 2021-05-26 NOTE — PATIENT CARE CONFERENCE
Intensive Care Daily Quality Rounding Checklist      ICU Team Members: bedside nurse, charge nurse,RT, clinical pharmacist, Christal Shaper, residents    ICU Day #: NUMBER: 2    Intubation Date:     Ventilator Day #:    Central Line Insertion Date: May 25        Day #: NUMBER: 2     Arterial Line Insertion Date:       Day #:     Temporary Hemodialysis Catheter Insertion Date:     Day #     DVT Prophylaxis: lovenox    GI Prophylaxis: carb control diet     Blair Catheter Insertion Date: May 25       Day #: 2      Continued need (if yes, reason documented and discussed with physician): yes, need I&O in critically ill    Skin Issues/ Wounds and ordered treatment discussed on rounds: No, SOS precautions in place    Goals/ Plans for the Day: wean pressors as able,labs,check NICOM,give liter bolus

## 2021-05-27 ENCOUNTER — APPOINTMENT (OUTPATIENT)
Dept: GENERAL RADIOLOGY | Age: 76
DRG: 871 | End: 2021-05-27
Payer: MEDICARE

## 2021-05-27 ENCOUNTER — ANESTHESIA EVENT (OUTPATIENT)
Dept: ENDOSCOPY | Age: 76
DRG: 871 | End: 2021-05-27
Payer: MEDICARE

## 2021-05-27 LAB
ABO/RH: NORMAL
ALBUMIN SERPL-MCNC: 2.4 G/DL (ref 3.5–5.2)
ALP BLD-CCNC: 60 U/L (ref 35–104)
ALT SERPL-CCNC: 7 U/L (ref 0–32)
ANION GAP SERPL CALCULATED.3IONS-SCNC: 10 MMOL/L (ref 7–16)
ANISOCYTOSIS: ABNORMAL
ANTIBODY SCREEN: NORMAL
AST SERPL-CCNC: 9 U/L (ref 0–31)
BASOPHILS ABSOLUTE: 0 E9/L (ref 0–0.2)
BASOPHILS RELATIVE PERCENT: 0 % (ref 0–2)
BILIRUB SERPL-MCNC: 0.2 MG/DL (ref 0–1.2)
BLOOD BANK DISPENSE STATUS: NORMAL
BLOOD BANK PRODUCT CODE: NORMAL
BPU ID: NORMAL
BUN BLDV-MCNC: 19 MG/DL (ref 6–23)
BURR CELLS: ABNORMAL
C-REACTIVE PROTEIN: 22.1 MG/DL (ref 0–0.4)
CALCIUM SERPL-MCNC: 7.1 MG/DL (ref 8.6–10.2)
CHLORIDE BLD-SCNC: 102 MMOL/L (ref 98–107)
CO2: 26 MMOL/L (ref 22–29)
CREAT SERPL-MCNC: 1 MG/DL (ref 0.5–1)
DESCRIPTION BLOOD BANK: NORMAL
DOHLE BODIES: ABNORMAL
EOSINOPHILS ABSOLUTE: 0 E9/L (ref 0.05–0.5)
EOSINOPHILS RELATIVE PERCENT: 0 % (ref 0–6)
GFR AFRICAN AMERICAN: >60
GFR NON-AFRICAN AMERICAN: 54 ML/MIN/1.73
GLUCOSE BLD-MCNC: 118 MG/DL (ref 74–99)
HCT VFR BLD CALC: 20.2 % (ref 34–48)
HCT VFR BLD CALC: 24.4 % (ref 34–48)
HCT VFR BLD CALC: 26.5 % (ref 34–48)
HEMOGLOBIN: 6.4 G/DL (ref 11.5–15.5)
HEMOGLOBIN: 7.9 G/DL (ref 11.5–15.5)
HEMOGLOBIN: 8.6 G/DL (ref 11.5–15.5)
LACTIC ACID: 2 MMOL/L (ref 0.5–2.2)
LACTIC ACID: 2.1 MMOL/L (ref 0.5–2.2)
LACTIC ACID: 2.8 MMOL/L (ref 0.5–2.2)
LACTIC ACID: 3.5 MMOL/L (ref 0.5–2.2)
LYMPHOCYTES ABSOLUTE: 0.92 E9/L (ref 1.5–4)
LYMPHOCYTES RELATIVE PERCENT: 5.2 % (ref 20–42)
MAGNESIUM: 1.2 MG/DL (ref 1.6–2.6)
MCH RBC QN AUTO: 23.1 PG (ref 26–35)
MCHC RBC AUTO-ENTMCNC: 31.7 % (ref 32–34.5)
MCV RBC AUTO: 72.9 FL (ref 80–99.9)
METAMYELOCYTES RELATIVE PERCENT: 2.6 % (ref 0–1)
METER GLUCOSE: 160 MG/DL (ref 74–99)
METER GLUCOSE: 164 MG/DL (ref 74–99)
METER GLUCOSE: 194 MG/DL (ref 74–99)
MONOCYTES ABSOLUTE: 0.73 E9/L (ref 0.1–0.95)
MONOCYTES RELATIVE PERCENT: 3.5 % (ref 2–12)
NEUTROPHILS ABSOLUTE: 16.65 E9/L (ref 1.8–7.3)
NEUTROPHILS RELATIVE PERCENT: 88.7 % (ref 43–80)
NUCLEATED RED BLOOD CELLS: 0 /100 WBC
ORGANISM: ABNORMAL
OVALOCYTES: ABNORMAL
PDW BLD-RTO: 17.2 FL (ref 11.5–15)
PLATELET # BLD: 203 E9/L (ref 130–450)
PMV BLD AUTO: 10.9 FL (ref 7–12)
POIKILOCYTES: ABNORMAL
POLYCHROMASIA: ABNORMAL
POTASSIUM REFLEX MAGNESIUM: 3.2 MMOL/L (ref 3.5–5)
RBC # BLD: 2.77 E12/L (ref 3.5–5.5)
SCHISTOCYTES: ABNORMAL
SODIUM BLD-SCNC: 138 MMOL/L (ref 132–146)
TEAR DROP CELLS: ABNORMAL
TOTAL PROTEIN: 5.4 G/DL (ref 6.4–8.3)
TOXIC GRANULATION: ABNORMAL
VACUOLATED NEUTROPHILS: ABNORMAL
WBC # BLD: 18.3 E9/L (ref 4.5–11.5)

## 2021-05-27 PROCEDURE — 87040 BLOOD CULTURE FOR BACTERIA: CPT

## 2021-05-27 PROCEDURE — 6360000002 HC RX W HCPCS: Performed by: INTERNAL MEDICINE

## 2021-05-27 PROCEDURE — 6360000002 HC RX W HCPCS: Performed by: STUDENT IN AN ORGANIZED HEALTH CARE EDUCATION/TRAINING PROGRAM

## 2021-05-27 PROCEDURE — 83605 ASSAY OF LACTIC ACID: CPT

## 2021-05-27 PROCEDURE — 85014 HEMATOCRIT: CPT

## 2021-05-27 PROCEDURE — 83735 ASSAY OF MAGNESIUM: CPT

## 2021-05-27 PROCEDURE — 94640 AIRWAY INHALATION TREATMENT: CPT

## 2021-05-27 PROCEDURE — 99233 SBSQ HOSP IP/OBS HIGH 50: CPT | Performed by: FAMILY MEDICINE

## 2021-05-27 PROCEDURE — 86140 C-REACTIVE PROTEIN: CPT

## 2021-05-27 PROCEDURE — 2700000000 HC OXYGEN THERAPY PER DAY

## 2021-05-27 PROCEDURE — 86901 BLOOD TYPING SEROLOGIC RH(D): CPT

## 2021-05-27 PROCEDURE — C9113 INJ PANTOPRAZOLE SODIUM, VIA: HCPCS | Performed by: STUDENT IN AN ORGANIZED HEALTH CARE EDUCATION/TRAINING PROGRAM

## 2021-05-27 PROCEDURE — 86850 RBC ANTIBODY SCREEN: CPT

## 2021-05-27 PROCEDURE — 2000000000 HC ICU R&B

## 2021-05-27 PROCEDURE — P9016 RBC LEUKOCYTES REDUCED: HCPCS

## 2021-05-27 PROCEDURE — 2580000003 HC RX 258: Performed by: STUDENT IN AN ORGANIZED HEALTH CARE EDUCATION/TRAINING PROGRAM

## 2021-05-27 PROCEDURE — 6370000000 HC RX 637 (ALT 250 FOR IP): Performed by: INTERNAL MEDICINE

## 2021-05-27 PROCEDURE — 86900 BLOOD TYPING SEROLOGIC ABO: CPT

## 2021-05-27 PROCEDURE — 71045 X-RAY EXAM CHEST 1 VIEW: CPT

## 2021-05-27 PROCEDURE — 36415 COLL VENOUS BLD VENIPUNCTURE: CPT

## 2021-05-27 PROCEDURE — 2580000003 HC RX 258: Performed by: INTERNAL MEDICINE

## 2021-05-27 PROCEDURE — 85018 HEMOGLOBIN: CPT

## 2021-05-27 PROCEDURE — 85025 COMPLETE CBC W/AUTO DIFF WBC: CPT

## 2021-05-27 PROCEDURE — 6370000000 HC RX 637 (ALT 250 FOR IP): Performed by: STUDENT IN AN ORGANIZED HEALTH CARE EDUCATION/TRAINING PROGRAM

## 2021-05-27 PROCEDURE — 2580000003 HC RX 258

## 2021-05-27 PROCEDURE — 86923 COMPATIBILITY TEST ELECTRIC: CPT

## 2021-05-27 PROCEDURE — 37799 UNLISTED PX VASCULAR SURGERY: CPT

## 2021-05-27 PROCEDURE — 82962 GLUCOSE BLOOD TEST: CPT

## 2021-05-27 PROCEDURE — 2500000003 HC RX 250 WO HCPCS: Performed by: INTERNAL MEDICINE

## 2021-05-27 PROCEDURE — 36430 TRANSFUSION BLD/BLD COMPNT: CPT

## 2021-05-27 PROCEDURE — 80053 COMPREHEN METABOLIC PANEL: CPT

## 2021-05-27 RX ORDER — POTASSIUM CHLORIDE 29.8 MG/ML
40 INJECTION INTRAVENOUS
Status: COMPLETED | OUTPATIENT
Start: 2021-05-27 | End: 2021-05-27

## 2021-05-27 RX ORDER — MAGNESIUM SULFATE IN WATER 40 MG/ML
4000 INJECTION, SOLUTION INTRAVENOUS ONCE
Status: COMPLETED | OUTPATIENT
Start: 2021-05-27 | End: 2021-05-27

## 2021-05-27 RX ORDER — DEXTROSE AND SODIUM CHLORIDE 5; .45 G/100ML; G/100ML
INJECTION, SOLUTION INTRAVENOUS CONTINUOUS
Status: DISCONTINUED | OUTPATIENT
Start: 2021-05-27 | End: 2021-05-28

## 2021-05-27 RX ORDER — SODIUM CHLORIDE 9 MG/ML
INJECTION, SOLUTION INTRAVENOUS PRN
Status: DISCONTINUED | OUTPATIENT
Start: 2021-05-27 | End: 2021-06-03 | Stop reason: HOSPADM

## 2021-05-27 RX ADMIN — ARFORMOTEROL TARTRATE 15 MCG: 15 SOLUTION RESPIRATORY (INHALATION) at 20:00

## 2021-05-27 RX ADMIN — FENTANYL CITRATE 25 MCG: 50 INJECTION, SOLUTION INTRAMUSCULAR; INTRAVENOUS at 18:42

## 2021-05-27 RX ADMIN — FENTANYL CITRATE 25 MCG: 50 INJECTION, SOLUTION INTRAMUSCULAR; INTRAVENOUS at 22:37

## 2021-05-27 RX ADMIN — HYDROCORTISONE SODIUM SUCCINATE 100 MG: 100 INJECTION, POWDER, FOR SOLUTION INTRAMUSCULAR; INTRAVENOUS at 13:48

## 2021-05-27 RX ADMIN — BUDESONIDE 500 MCG: 0.5 SUSPENSION RESPIRATORY (INHALATION) at 20:00

## 2021-05-27 RX ADMIN — FENTANYL CITRATE 25 MCG: 50 INJECTION, SOLUTION INTRAMUSCULAR; INTRAVENOUS at 13:42

## 2021-05-27 RX ADMIN — WATER 2 ML: 1 INJECTION INTRAMUSCULAR; INTRAVENOUS; SUBCUTANEOUS at 22:14

## 2021-05-27 RX ADMIN — IPRATROPIUM BROMIDE AND ALBUTEROL SULFATE 1 AMPULE: 2.5; .5 SOLUTION RESPIRATORY (INHALATION) at 20:00

## 2021-05-27 RX ADMIN — INSULIN LISPRO 2 UNITS: 100 INJECTION, SOLUTION INTRAVENOUS; SUBCUTANEOUS at 18:55

## 2021-05-27 RX ADMIN — INSULIN LISPRO 1 UNITS: 100 INJECTION, SOLUTION INTRAVENOUS; SUBCUTANEOUS at 22:34

## 2021-05-27 RX ADMIN — DEXTROSE AND SODIUM CHLORIDE: 5; 450 INJECTION, SOLUTION INTRAVENOUS at 22:36

## 2021-05-27 RX ADMIN — SODIUM CHLORIDE, PRESERVATIVE FREE 10 ML: 5 INJECTION INTRAVENOUS at 22:14

## 2021-05-27 RX ADMIN — CEFEPIME HYDROCHLORIDE 2000 MG: 2 INJECTION, POWDER, FOR SOLUTION INTRAVENOUS at 02:10

## 2021-05-27 RX ADMIN — PANTOPRAZOLE SODIUM 40 MG: 40 INJECTION, POWDER, FOR SOLUTION INTRAVENOUS at 22:13

## 2021-05-27 RX ADMIN — PANTOPRAZOLE SODIUM 40 MG: 40 INJECTION, POWDER, FOR SOLUTION INTRAVENOUS at 07:47

## 2021-05-27 RX ADMIN — IPRATROPIUM BROMIDE AND ALBUTEROL SULFATE 1 AMPULE: 2.5; .5 SOLUTION RESPIRATORY (INHALATION) at 12:32

## 2021-05-27 RX ADMIN — MUPIROCIN: 20 OINTMENT TOPICAL at 22:14

## 2021-05-27 RX ADMIN — LORAZEPAM 0.5 MG: 2 INJECTION INTRAMUSCULAR; INTRAVENOUS at 02:02

## 2021-05-27 RX ADMIN — WATER 2 ML: 1 INJECTION INTRAMUSCULAR; INTRAVENOUS; SUBCUTANEOUS at 07:01

## 2021-05-27 RX ADMIN — IPRATROPIUM BROMIDE AND ALBUTEROL SULFATE 1 AMPULE: 2.5; .5 SOLUTION RESPIRATORY (INHALATION) at 07:59

## 2021-05-27 RX ADMIN — INSULIN LISPRO 2 UNITS: 100 INJECTION, SOLUTION INTRAVENOUS; SUBCUTANEOUS at 11:37

## 2021-05-27 RX ADMIN — SODIUM CHLORIDE, PRESERVATIVE FREE 10 ML: 5 INJECTION INTRAVENOUS at 07:48

## 2021-05-27 RX ADMIN — SODIUM CHLORIDE: 9 INJECTION, SOLUTION INTRAVENOUS at 06:53

## 2021-05-27 RX ADMIN — BUDESONIDE 500 MCG: 0.5 SUSPENSION RESPIRATORY (INHALATION) at 07:58

## 2021-05-27 RX ADMIN — HYDROCORTISONE SODIUM SUCCINATE 100 MG: 100 INJECTION, POWDER, FOR SOLUTION INTRAMUSCULAR; INTRAVENOUS at 22:14

## 2021-05-27 RX ADMIN — ARFORMOTEROL TARTRATE 15 MCG: 15 SOLUTION RESPIRATORY (INHALATION) at 07:58

## 2021-05-27 RX ADMIN — Medication: at 04:04

## 2021-05-27 RX ADMIN — MUPIROCIN: 20 OINTMENT TOPICAL at 11:44

## 2021-05-27 RX ADMIN — MAGNESIUM SULFATE 4000 MG: 4 INJECTION INTRAVENOUS at 09:07

## 2021-05-27 RX ADMIN — DEXTROSE AND SODIUM CHLORIDE: 5; 450 INJECTION, SOLUTION INTRAVENOUS at 11:11

## 2021-05-27 RX ADMIN — POTASSIUM CHLORIDE 40 MEQ: 29.8 INJECTION, SOLUTION INTRAVENOUS at 11:15

## 2021-05-27 RX ADMIN — FENTANYL CITRATE 25 MCG: 50 INJECTION, SOLUTION INTRAMUSCULAR; INTRAVENOUS at 09:25

## 2021-05-27 RX ADMIN — IPRATROPIUM BROMIDE AND ALBUTEROL SULFATE 1 AMPULE: 2.5; .5 SOLUTION RESPIRATORY (INHALATION) at 16:16

## 2021-05-27 RX ADMIN — CEFEPIME HYDROCHLORIDE 2000 MG: 2 INJECTION, POWDER, FOR SOLUTION INTRAVENOUS at 13:48

## 2021-05-27 RX ADMIN — HYDROCORTISONE SODIUM SUCCINATE 100 MG: 100 INJECTION, POWDER, FOR SOLUTION INTRAMUSCULAR; INTRAVENOUS at 07:01

## 2021-05-27 RX ADMIN — POTASSIUM CHLORIDE 40 MEQ: 29.8 INJECTION, SOLUTION INTRAVENOUS at 12:04

## 2021-05-27 ASSESSMENT — PAIN SCALES - GENERAL
PAINLEVEL_OUTOF10: 0
PAINLEVEL_OUTOF10: 4
PAINLEVEL_OUTOF10: 0
PAINLEVEL_OUTOF10: 6
PAINLEVEL_OUTOF10: 6
PAINLEVEL_OUTOF10: 0
PAINLEVEL_OUTOF10: 0
PAINLEVEL_OUTOF10: 6
PAINLEVEL_OUTOF10: 0
PAINLEVEL_OUTOF10: 0

## 2021-05-27 NOTE — PLAN OF CARE
Problem: Skin Integrity:  Goal: Will show no infection signs and symptoms  Description: Will show no infection signs and symptoms  5/27/2021 0253 by Ramiro Morales RN  Outcome: Met This Shift  5/26/2021 2035 by Mary Lou Aguilar RN  Outcome: Met This Shift  Goal: Absence of new skin breakdown  Description: Absence of new skin breakdown  Outcome: Met This Shift     Problem: Falls - Risk of:  Goal: Will remain free from falls  Description: Will remain free from falls  5/27/2021 0253 by Ramiro Morales RN  Outcome: Met This Shift  5/26/2021 2035 by Mary Lou Aguilar RN  Outcome: Met This Shift  Goal: Absence of physical injury  Description: Absence of physical injury  Outcome: Met This Shift     Problem: ABCDS Injury Assessment  Goal: Absence of physical injury  5/27/2021 0253 by Ramiro Morales RN  Outcome: Met This Shift  5/26/2021 2035 by Mary Lou Aguilar RN  Outcome: Met This Shift     Problem: Cardiac Output - Decreased:  Goal: Hemodynamic stability will improve  Description: Hemodynamic stability will improve  5/27/2021 0253 by Ramiro Morales RN  Outcome: Met This Shift  5/26/2021 2036 by Mary Lou Aguilar RN  Outcome: Ongoing     Problem: Gas Exchange - Impaired:  Goal: Levels of oxygenation will improve  Description: Levels of oxygenation will improve  5/27/2021 0253 by Ramiro Morales RN  Outcome: Met This Shift  5/26/2021 2036 by Mary Lou Aguilar RN  Outcome: Ongoing     Problem: Anxiety:  Goal: Level of anxiety will decrease  Description: Level of anxiety will decrease  5/27/2021 0253 by Ramiro Morales RN  Outcome: Met This Shift  5/26/2021 2036 by Mary Lou Aguilar RN  Outcome: Ongoing

## 2021-05-27 NOTE — PLAN OF CARE
Problem: Cardiac Output - Decreased:  Goal: Hemodynamic stability will improve  Description: Hemodynamic stability will improve  Outcome: Ongoing     Problem: Gas Exchange - Impaired:  Goal: Levels of oxygenation will improve  Description: Levels of oxygenation will improve  Outcome: Ongoing     Problem: Anxiety:  Goal: Level of anxiety will decrease  Description: Level of anxiety will decrease  Outcome: Ongoing

## 2021-05-27 NOTE — PROGRESS NOTES
(transient ischemic attack)       PAST SURGICAL HISTORY:      Procedure Laterality Date    HYSTERECTOMY      JOINT REPLACEMENT      JOINT REPLACEMENT Bilateral     JOINT REPLACEMENT Bilateral     TONSILLECTOMY       VENT SETTINGS  Vent Information  SpO2: 100 %  Additional Respiratory  Assessments  Pulse: 84  Resp: 19  SpO2: 100 %  Oral Care: Mouth swabbed    Review of Systems      Physical Exam:    General appearance - alert, well appearing, and in no distress and oriented to person, place, and time. Mental status - alert, oriented to person, place, and time, normal mood, behavior, speech, dress, motor activity, and thought processes  Eyes - pupils equal and reactive, extraocular eye movements intact, sclera anicteric  Ears - external ear normal  Nose - normal and patent, no erythema, discharge or polyps  Mouth - mucous membranes moist, pharynx normal without lesions  Neck - supple, no significant adenopathy  Chest - clear to auscultation, no wheezes, rales or rhonchi, symmetric air entry  Heart - normal rate, regular rhythm, normal S1, S2, no murmurs, rubs, clicks or gallops  Abdomen - soft, nontender, nondistended, no masses or organomegaly  Neurological - alert, oriented, normal speech, no focal findings or movement disorder noted  Extremities - peripheral pulses normal, no clubbing or cyanosis. No edema.   Skin - normal coloration and turgor, no rashes, no suspicious skin lesions noted      Medications:    Scheduled Meds:   cefepime  2,000 mg Intravenous Q12H    pantoprazole  40 mg Intravenous BID    And    sodium chloride (PF)  10 mL Intravenous BID    Arformoterol Tartrate  15 mcg Nebulization BID    budesonide  0.5 mg Nebulization BID    ipratropium-albuterol  1 ampule Inhalation Q4H WA    insulin lispro  0-12 Units Subcutaneous TID WC    insulin lispro  0-6 Units Subcutaneous Nightly    sterile water        sodium chloride  250 mL Intravenous Once    enoxaparin  1 mg/kg Subcutaneous Q12H    hydrocortisone sodium succinate PF  100 mg Intravenous Q8H     Continuous Infusions:   sodium chloride Stopped (05/26/21 1944)    IV infusion builder 150 mL/hr at 05/26/21 1358    norepinephrine 1 mcg/min (05/26/21 2018)    sodium chloride      dextrose       PRN Meds:   melatonin, 3 mg, Nightly PRN  hydrOXYzine, 25 mg, TID PRN  fentanNYL, 25 mcg, Q2H PRN  LORazepam, 0.5 mg, Q6H PRN  sodium chloride flush, 5-40 mL, PRN  sodium chloride, 25 mL, PRN  acetaminophen, 650 mg, Q6H PRN   Or  acetaminophen, 650 mg, Q6H PRN  glucose, 15 g, PRN  dextrose, 12.5 g, PRN  glucagon (rDNA), 1 mg, PRN  dextrose, 100 mL/hr, PRN  perflutren lipid microspheres, 1.5 mL, ONCE PRN    Labs:    Complete Blood Count:   Recent Labs     05/25/21  1455 05/26/21  0610   WBC 7.0 28.6*   HGB 9.4* 7.9*   HCT 32.1* 26.5*   * 385        Last 3 Blood Glucose:   Recent Labs     05/25/21  1455 05/26/21  0610 05/26/21  1320   GLUCOSE 169* 160* 222*        PT/INR:  No results found for: PROTIME, INR  PTT:  No results found for: APTT, PTT    Comprehensive Metabolic Profile:   Recent Labs     05/25/21  1455 05/25/21  1701 05/26/21  0610 05/26/21  1320   *  --  134 134   K 5.3* 4.0 4.2 4.8   CL 95*  --  104 105   CO2 14*  --  14* 15*   BUN 14  --  17 19   CREATININE 1.3*  --  1.4* 1.3*   GLUCOSE 169*  --  160* 222*   CALCIUM 9.0  --  8.0* 7.6*   PROT 7.1  --  6.3*  --    LABALBU 3.0*  --  2.7*  --    BILITOT 0.4  --  0.3  --    ALKPHOS 94  --  78  --    AST 24  --  14  --    ALT 12  --  10  --       Magnesium: No results found for: MG  Phosphorus: No results found for: PHOS  Ionized Calcium: No results found for: CAION     Troponin: No results for input(s): TROPONINI in the last 72 hours. ABG: No results for input(s): PH, PCO2, PO2, HCO3, BE, O2SAT in the last 72 hours.       Radiology/Imaging:   Echo Complete    Result Date: 5/26/2021  Transthoracic Echocardiography Report (TTE)  Demographics   Patient Name    Ritesh Casianomojyoti        Gender            Female IRINA   Medical Record  [id number]     Room Number       0207  Number   Account #       [de-identified]    Procedure Date    05/26/2021   Corporate ID                 Ordering                               Physician   Accession       [id number]   Referring         Margaret Blanco MD  Number                       Physician         Domingo Hinds MD   Date of Birth   1945   Sonographer       Nicole Sandoval [facility]   Age             66 year(s)   Interpreting      [address]                               Physician         Physician Cardiology                                                 Newton Flores MD                                Any Other  Procedure Type of Study   TTE procedure:Echo Complete W/Doppler & Color Flow. Procedure Date Date: 05/26/2021 Start: 07:40 AM Study Location: Portable Technical Quality: Poor visualization due to patient immobility. Indications:Pulmonary embolus. Patient Status: Routine Contrast Medium: Definity. Height: 60 inches Weight: 164 pounds BSA: 1.72 m^2 BMI: 32.03 kg/m^2 HR: 90 bpm BP: 100/42 mmHg  Findings   Left Ventricle  Left ventricular internal dimensions were normal in diastole and systole. Borderline concentric left ventricular hypertrophy. Micro-bubble contrast injected to enhance left ventricular visualization. No regional wall motion abnormalities seen. Normal left ventricular ejection fraction. Ejection fraction is visually estimated at 55-60%. Indeterminate diastolic function. Right Ventricle  Normal right ventricular size and function. Left Atrium  Normal sized left atrium. Interatrial septum appears intact. Right Atrium  Normal right atrium size. Mitral Valve  Structurally normal mitral valve. Mild mitral annular calcification. Tricuspid Valve  The tricuspid valve appears structurally normal.   Aortic Valve  The aortic valve appears mildly sclerotic.    Pulmonic Valve  Pulmonic valve is structurally normal.   Pericardial Effusion  No evidence of pericardial effusion. Aorta  Aortic root dimension within normal limits. Conclusions   Summary  Left ventricular internal dimensions were normal in diastole and systole. Borderline concentric left ventricular hypertrophy. No regional wall motion abnormalities seen. Normal left ventricular ejection fraction. Mild mitral annular calcification. The aortic valve appears mildly sclerotic.    Signature   ----------------------------------------------------------------  Electronically signed by Prosper Lynch MD(Interpreting  physician) on 05/26/2021 07:18 PM  ----------------------------------------------------------------  M-Mode/2D Measurements & Calculations   LV Diastolic    LV Systolic Dimension: 3 cm  AV Cusp Separation: 1.8 cmLA  Dimension: 4.2  LV Volume Diastolic: 00.0 ml Dimension: 3.3 cmAO Root  cm              LV Volume Systolic: 50.8 ml  Dimension: 2.5 cm  LV FS:28.6 %    LV EDV/LV EDV Index: 77.7  LV PW           ml/45 ml/m^2LV ESV/LV ESV  Diastolic: 1.1  Index: 45.6 ml/20ml/ m^2  cm              EF Calculated: 56.6 %        RV Diastolic Dimension: 2.6  LV PW Systolic: LV Mass Index: 91 l/min*m^2  cm  1.3 cm  Septum                                       LA/Aorta: 3.66  Diastolic: 1.1  LVOT: 2 cm                   Ascending Aorta: 2.7 cm  cm                                           LA volume/Index: 37 ml  Septum                                       /76IP/A^3  Systolic: 1.3                                RA Area: 13.2 cm^2  cm  CO: 6.44 l/min                               IVC Expiration: 1.3 cm  CI: 3.74  l/m*m^2  LV Mass: 157.06  g  Doppler Measurements & Calculations   MV Peak E-Wave: 1.04 AV Peak Velocity:     LVOT Peak Velocity: 1.15 m/s  m/s                  1.49 m/s              LVOT Mean Velocity: 0.82 m/s  MV Peak A-Wave: 1.26 AV Peak Gradient:     LVOT Peak Gradient: 5.3  m/s                  8.92 mmHg             mmHgLVOT Mean Gradient: 3 mmHg  MV E/A Ratio: 0.82   AV Mean Velocity:  MV Peak Gradient:    1.03 m/s  6.2 mmHg             AV Mean Gradient: 4.7  MV Mean Gradient:    mmHg                  TR Velocity:2.43 m/s  2.9 mmHg             AV VTI: 24.7 cm       TR Gradient:23.7 mmHg  MV Mean Velocity:    AV Area  0.81 m/s             (Continuity):2.9 cm^2  MV Deceleration  Time: 159.1 msec     LVOT VTI: 22.8 cm  MV P1/2t: 66.7 msec  IVRT: 69.2 msec  MVA by PHT:3.3 cm^2  MV Area  (continuity): 2.6  cm^2  MV E' Septal  Velocity: 0.05 m/s  MV E' Lateral  Velocity: 8 m/s  [email or web address] [id number]    CT ABDOMEN PELVIS WO CONTRAST Additional Contrast? None    Result Date: 5/25/2021  EXAMINATION: CT OF THE ABDOMEN AND PELVIS WITHOUT CONTRAST 5/25/2021 8:26 pm TECHNIQUE: CT of the abdomen and pelvis was performed without the administration of intravenous contrast. Multiplanar reformatted images are provided for review. Dose modulation, iterative reconstruction, and/or weight based adjustment of the mA/kV was utilized to reduce the radiation dose to as low as reasonably achievable. COMPARISON: None. HISTORY: ORDERING SYSTEM PROVIDED HISTORY: sepsis TECHNOLOGIST PROVIDED HISTORY: Reason for exam:->sepsis Additional Contrast?->None FINDINGS: Lower Chest: Atelectatic changes and pleuroparenchymal scarring in the lung bases, likely chronic. Images significantly degraded by respiratory motion artifact. Organs: Diffuse fatty infiltration of the liver. Large gallstones in the dependent portion of the gallbladder. The spleen, pancreas are within normal limits. Mostly hypodense and homogeneous right adrenal nodule measuring approximately 2.8 cm in diameter, probable adenoma. Further evaluation with adrenal washout CT or chemical shift MRI recommended. There is contrast in the renal collecting systems from prior CTA of the chest.  No evidence of obstructive uropathy. Incidental l renal cysts.  Bilateral perinephric stranding, which may be chronic. GI/Bowel: No evidence of bowel obstruction or free intraperitoneal air. No colitis or diverticulitis. No signs of appendicitis. Pelvis: There is prominent artifact in the pelvis secondary to bilateral hip prosthesis. Blair catheter in the decompressed urinary bladder. Peritoneum/Retroperitoneum: Atherosclerotic calcification along the abdominal aorta with no evidence of aneurysmal dilatation. Bones/Soft Tissues: No acute bony pathology. Large gallstones in the dependent portion of the gallbladder. Mostly hypodense and homogeneous right adrenal nodule measuring 2.8 cm in diameter, probable adenoma. Further evaluation with adrenal washout CT or chemical shift MRI recommended. Other chronic or incidental findings as noted. CT HEAD WO CONTRAST    Result Date: 5/25/2021  EXAMINATION: CT OF THE HEAD WITHOUT CONTRAST  5/25/2021 4:32 pm TECHNIQUE: CT of the head was performed without the administration of intravenous contrast. Dose modulation, iterative reconstruction, and/or weight based adjustment of the mA/kV was utilized to reduce the radiation dose to as low as reasonably achievable. COMPARISON: None. HISTORY: ORDERING SYSTEM PROVIDED HISTORY: AMS TECHNOLOGIST PROVIDED HISTORY: Has a \"code stroke\" or \"stroke alert\" been called? ->No Reason for exam:->AMS Decision Support Exception - unselect if not a suspected or confirmed emergency medical condition->Emergency Medical Condition (MA) FINDINGS: BRAIN/VENTRICLES: There is no acute intracranial hemorrhage, mass effect or midline shift. No abnormal extra-axial fluid collection. The gray-white differentiation is maintained without evidence of an acute infarct. There is no evidence of hydrocephalus. Periventricular white matter changes consistent chronic microvascular disease. Diffuse volume loss. ORBITS: The visualized portion of the orbits demonstrate no acute abnormality.  SINUSES: The visualized paranasal sinuses and mastoid air cells demonstrate no acute abnormality. SOFT TISSUES/SKULL:  No acute abnormality of the visualized skull or soft tissues. No acute intracranial abnormality. Periventricular white matter changes consistent chronic microvascular disease. Diffuse volume loss. XR CHEST PORTABLE    Result Date: 5/25/2021  EXAMINATION: ONE XRAY VIEW OF THE CHEST 5/25/2021 3:18 pm COMPARISON: None. HISTORY: ORDERING SYSTEM PROVIDED HISTORY: sepsis TECHNOLOGIST PROVIDED HISTORY: Reason for exam:->sepsis FINDINGS: Interstitial opacities are present in perihilar locations. No evidence of pleural effusion. The heart is normal size. No pneumothorax. Interstitial opacities in perihilar locations could suggest subsegmental atelectasis or bronchopneumonia. Short-term follow-up may be helpful for further evaluation. CTA CHEST W CONTRAST    Result Date: 5/25/2021  EXAMINATION: CTA OF THE CHEST 5/25/2021 4:29 pm TECHNIQUE: CTA of the chest was performed after the administration of intravenous contrast.  Multiplanar reformatted images are provided for review. MIP images are provided for review. Dose modulation, iterative reconstruction, and/or weight based adjustment of the mA/kV was utilized to reduce the radiation dose to as low as reasonably achievable. COMPARISON: None. HISTORY: ORDERING SYSTEM PROVIDED HISTORY: Possible PE TECHNOLOGIST PROVIDED HISTORY: Reason for exam:->Possible PE Decision Support Exception - unselect if not a suspected or confirmed emergency medical condition->Emergency Medical Condition (MA) FINDINGS: There are motion artifacts particular for the lower lobes causing misregistration of the images and loss of detail. There are a group of segment/subsegmental branches of the right lower lobe pulmonary artery towards the posterior segmental region which are not filling properly with contrast more likely represent a form of acute/subacute pulmonary embolus.   These are seen on imaging A2/109-115 A3/85-87, A4/92. There is also some incomplete filling of more distal subsegmental branches of the left lower lobe. No acute pulmonary embolus seen in the more central pulmonary arteries. The diameter for the main pulmonary artery is 3.3 cm and for the left main pulmonary artery is 2.6 cm and for the right main pulmonary artery is 2 cm. There is no aneurysm formation or dissection of the thoracic aorta. Heart is normal size. The some calcifications are seen in the coronary arteries. There is no pericardial effusion. No mediastinal mass or adenopathy seen. Lymph nodes in the mediastinum are borderline in size and number. There are areas of a peripheral subsegmental atelectasis in both the upper lobes. The there is also some additional areas of atelectasis in the lower lobes. The motion artifacts again causes misregistration of the images and loss of detail in the lung parenchyma. Upper abdominal structures demonstrates some a hydronephrotic like changes for the right kidney not fully covered on this study. The can follow with a KUB which will function like an late IVP imaging for the right kidney. The is a large size gallstone in the upper body of the gallbladder measures 15 mm. There is a hyperdense cyst likely in the upper pole of the left kidney to be further evaluated with ultrasound measuring 18 mm. There is a nodular appearance for the left adrenal gland with relative low density which could represent a nonfunctioning adenoma but proper evaluation will required a adrenal protocol study on routine basis with the CT or MRI. 1.  There are limitation on the present study due motion artifacts caused misregistration of the images as above discussed. 2.  However there appears to be signs for acute/subacute pulmonary embolus in the lower lobe pulmonary arteries more noticeable on the right lower lobe as above commented. Preliminary report given to Dr. Thai Mcguire,  physician borderline.      [address] [facility] DUP LOWER EXTREMITIES BILATERAL VENOUS    Result Date: 5/25/2021  EXAMINATION: DUPLEX VENOUS ULTRASOUND OF THE BILATERAL LOWER EXTREMITIES, 5/25/2021 11:36 pm TECHNIQUE: Duplex ultrasound using B-mode/gray scaled imaging and Doppler spectral analysis and color flow was obtained of the bilateral lower extremities. COMPARISON: None. HISTORY: ORDERING SYSTEM PROVIDED HISTORY: dvt TECHNOLOGIST PROVIDED HISTORY: Reason for exam:->dvt What reading provider will be dictating this exam?->CRC FINDINGS: Limitations: Unable to visualize the right common femoral vein/greater saphenous vein due to the presence of an IV catheter. Limited evaluation of the left calf veins. The visualized veins of the bilateral lower extremities are patent and free of echogenic thrombus. The veins demonstrate good compressibility with normal color flow study and spectral analysis. No evidence of DVT in either lower extremity given the limitations described. Chest Xray (5/26/2021):Pulmonary vascular congestive changes. ASSESSMENT & PLAN:     NEURO:    Neuro Intact  -Continue to monitor      RESPIRATORY:    Acute Respiratory failure with hypoxia  NC 2 L   -Brovana 15 mcg BID  -Pulmicort 500 mcg BID  -Duonebs Q4HR  -Wean oxygen as tolerated.  Keep O2 sat 90-92%     Bilateral PE  DVTs absent in lower extremities  -Lovenox 1mg/kg held    CARDIOVASCULAR:    Shock, Sepsis  NICOM non-responsive  -Levo 4 mcg/min  -D5 1/2  ml/hr  -Solu-Cortef 100 mg Q8HR (Day 2)  -Continue to wean as tolerated     GI:    GI Prophylaxis   -Protonix 40 mg BID    Heme occult positive   -EGD tomorrow   -GI Consulted, appreciate recs     RENAL:    GALEN  Resolved  -Continue to monitor  -D5 1/2  ml/hr     Low Bicarbonate  -Stopped Bicarb drip     Hypomagnesemia  -Replace as needed  -Continue to monitor    INFECTIOUS DISEASE:    Sepsis  WBC 28.6, Lactate 5.4, Blood cultures positive for gram negative rods, received cefepime, vancomycin and ertapenem  -Cefepime 1000 mg Q12HR (Day 3)  -Echo Pending  -ID Consulted, appreciate recs     HEME/ONC:    Anemia  Hb 7.9->6.4  -1 unit of PRBC ordered  -H&H Q8HR  -Lovenox 1mg/kg held  -Continue to monitor and replace if <7     ENDOCRINE:     T2DM    -MDSSI  -Hypoglycemia protocol    SOCIAL:    -Limited code  -Palliative Care consulted  -Fentanyl for chronic pain  -Ativan for anxiety      Mone Keyes MD, PGY-1                5/26/2021, 8:35 PM    I personally saw, examined and provided care for the patient. Radiographs, labs and medication list were reviewed by me independently. I spoke with bedside nursing, therapists and consultants. Critical care services and times documented are independent of procedures and multidisciplinary rounds with Residents. Additionally comprehensive, multidisciplinary rounds were conducted with the MICU team. The case was discussed in detail and plans for care were established. Review of Residents documentation was conducted and revisions were made as appropriate. I agree with the above documented exam, problem list and plan of care. Angie Jones M.D.    Pulmonary/Critical Care Medicine   36 min cct excluding procedures

## 2021-05-27 NOTE — PROGRESS NOTES
[address] [place] [address] [facility]  EVELIAIDA  Progress Note    SUBJECTIVE:  Chief Complaint   Patient presents with    Blood Infection     sent in for sepsis rule out     The patient is still in the ICU. She complains of feeling lousy and still having generalized aches and pains. No dyspnea. Nursing reports a moist nonproductive cough. Tolerating antibiotics. She is still on vasopressors. Review of systems:  As stated above in the chief complaint, otherwise negative. Medications:  Scheduled Meds:   magnesium sulfate  4,000 mg Intravenous Once    potassium chloride  40 mEq Intravenous Q2H    mupirocin   Topical BID    cefepime  2,000 mg Intravenous Q12H    pantoprazole  40 mg Intravenous BID    And    sodium chloride (PF)  10 mL Intravenous BID    Arformoterol Tartrate  15 mcg Nebulization BID    budesonide  0.5 mg Nebulization BID    ipratropium-albuterol  1 ampule Inhalation Q4H WA    insulin lispro  0-12 Units Subcutaneous TID WC    insulin lispro  0-6 Units Subcutaneous Nightly    sodium chloride  250 mL Intravenous Once    [Held by provider] enoxaparin  1 mg/kg Subcutaneous Q12H    hydrocortisone sodium succinate PF  100 mg Intravenous Q8H     Continuous Infusions:   sodium chloride      dextrose 5 % and 0.45 % NaCl 100 mL/hr at 21 1111    sodium chloride Stopped (21 0748)    norepinephrine 4 mcg/min (21 1039)    sodium chloride      dextrose       PRN Meds:sodium chloride, melatonin, hydrOXYzine, fentanNYL, LORazepam, sodium chloride flush, sodium chloride, acetaminophen **OR** acetaminophen, glucose, dextrose, glucagon (rDNA), dextrose, perflutren lipid microspheres    OBJECTIVE:  BP 90/63   Pulse 77   Temp 98.1 °F (36.7 °C)   Resp 18   Ht 5' (1.524 m)   Wt 173 lb 4.5 oz (78.6 kg)   SpO2 100%   BMI 33.84 kg/m²   Temp  Av.2 °F (36.8 °C)  Min: 97.7 °F (36.5 °C)  Max: 99 °F (37.2 °C)  Constitutional: The patient is lying in bed in the ICU. Awake and alert. Visitor present. Skin: Warm and dry. No rashes were noted. HEENT: Round and reactive pupils. Moist mucous membranes. No ulcerations or thrush. Neck: Supple to movements. Chest: No use of accessory muscles to breathe. Symmetrical expansion. No wheezing, crackles or rhonchi. Cardiovascular: S1 and S2 are rhythmic and regular. No murmurs appreciated. Abdomen: Positive bowel sounds to auscultation. Benign to palpation. Extremities: No edema. Bilateral TKR. Lines: Right femoral TLC 5/25/2021. Blair catheter.     Laboratory and Tests Review:  Lab Results   Component Value Date    WBC 18.3 (H) 05/27/2021    WBC 28.6 (H) 05/26/2021    WBC 7.0 05/25/2021    HGB 6.4 (LL) 05/27/2021    HCT 20.2 (L) 05/27/2021    MCV 72.9 (L) 05/27/2021     05/27/2021     Lab Results   Component Value Date    NEUTROABS 16.65 (H) 05/27/2021    NEUTROABS 26.88 (H) 05/26/2021    NEUTROABS 6.30 05/25/2021     No results found for: CRP  Lab Results   Component Value Date    ALT 7 05/27/2021    AST 9 05/27/2021    ALKPHOS 60 05/27/2021    BILITOT 0.2 05/27/2021     Lab Results   Component Value Date     05/27/2021    K 3.2 05/27/2021     05/27/2021    CO2 26 05/27/2021    BUN 19 05/27/2021    CREATININE 1.0 05/27/2021    CREATININE 1.3 05/26/2021    CREATININE 1.4 05/26/2021    GFRAA >60 05/27/2021    LABGLOM 54 05/27/2021    GLUCOSE 118 05/27/2021    PROT 5.4 05/27/2021    LABALBU 2.4 05/27/2021    CALCIUM 7.1 05/27/2021    BILITOT 0.2 05/27/2021    ALKPHOS 60 05/27/2021    AST 9 05/27/2021    ALT 7 05/27/2021     No results found for: CRP  No results found for: [address]  Radiology:      Microbiology:   Blood cultures 5/25/2021: Klebsiella pneumoniae I in 4 of 4 bottles  Blood cultures 5/27/2021: Pending  Nares screen MRSA: Positive    ASSESSMENT:  · Complicated UTI with sepsis secondary to Klebsiella  · Klebsiella septicemia associated to complicated UTI  · Septic shock  · Leukocytosis and fever secondary to sepsis. Patient is on steroids    PLAN:  · Continue Cefepime.   She received 1 dose of Ertapenem  · Repeat blood culture x1  · Monitor labs    Spoke with nursing    Ann Chun MD  11:44 AM  5/27/2021

## 2021-05-27 NOTE — PROGRESS NOTES
CI HR MAP TPRI SVI TFC   Baseline 2.9 70 86 2397 41 49.8   Test 3.0 69 75 2017 44 50.7   % Change 3.7 -0.5 -12.8 -15.9 7.7 1.7   noninvasive -  continue

## 2021-05-27 NOTE — PROGRESS NOTES
.  Intensive Care Daily Quality Rounding Checklist      ICU Team Members: Dr. Carmine Jeffries, residents, charge nurse, bedside nurse and respiratory therapy    ICU Day #: NUMBER: 3    Intubation Date:  N/A    Ventilator Day #: N/A    Central Line Insertion Date: May 25,2021        Day #: NUMBER: 3     Arterial Line Insertion Date: May 26,2021      Day #: NUMBER: 2    Temporary Hemodialysis Catheter Insertion Date:  N/A      Day # N/A    DVT Prophylaxis: Lovenox    GI Prophylaxis: Protonix    Blair Catheter Insertion Date: May 25,2021       Day #: 3      Continued need (if yes, reason documented and discussed with physician): yes, strict I&O in critical patient    Skin Issues/ Wounds and ordered treatment discussed on rounds: no issues, SOS precautions in place    Goals/ Plans for the Day: monitor labs and vitals, wean Levophed as able, continue antibiotic therapy as ordered, continue critical care management. EGD on 5/28 if stable. Monitor H&H 1 unit os PRBC' given.  Hold lovenox

## 2021-05-27 NOTE — PROGRESS NOTES
[facility] Progress Note    Admitting Date and Time: 5/25/2021  2:10 PM  Admit Dx: Septic shock ([facility] [A41.9, R65.21]    Subjective:  Patient is being followed for Septic shock ([facility] [A41.9, R65.21]   Pt denies any current pain or nausea. She has no new complaints. Per RN: Unable to wean Levophed but is fluid responsive    ROS: denies fever, chills, cp, sob, n/v, HA unless stated above.       cefepime  2,000 mg Intravenous Q12H    pantoprazole  40 mg Intravenous BID    And    sodium chloride (PF)  10 mL Intravenous BID    Arformoterol Tartrate  15 mcg Nebulization BID    budesonide  0.5 mg Nebulization BID    ipratropium-albuterol  1 ampule Inhalation Q4H WA    insulin lispro  0-12 Units Subcutaneous TID WC    insulin lispro  0-6 Units Subcutaneous Nightly    sodium chloride  250 mL Intravenous Once    enoxaparin  1 mg/kg Subcutaneous Q12H    hydrocortisone sodium succinate PF  100 mg Intravenous Q8H     sodium chloride, , PRN  melatonin, 3 mg, Nightly PRN  hydrOXYzine, 25 mg, TID PRN  fentanNYL, 25 mcg, Q2H PRN  LORazepam, 0.5 mg, Q6H PRN  sodium chloride flush, 5-40 mL, PRN  sodium chloride, 25 mL, PRN  acetaminophen, 650 mg, Q6H PRN   Or  acetaminophen, 650 mg, Q6H PRN  glucose, 15 g, PRN  dextrose, 12.5 g, PRN  glucagon (rDNA), 1 mg, PRN  dextrose, 100 mL/hr, PRN  perflutren lipid microspheres, 1.5 mL, ONCE PRN         Objective:    BP (!) 111/45   Pulse 80   Temp 97.7 °F (36.5 °C) (Bladder)   Resp 22   Ht 5' (1.524 m)   Wt 173 lb 4.5 oz (78.6 kg)   SpO2 96%   BMI 33.84 kg/m²     General Appearance: alert and oriented to person, place and time and in no acute distress  Skin: warm and dry  Head: normocephalic and atraumatic  Eyes: pupils equal, round, and reactive to light, extraocular eye movements intact, conjunctivae normal  Neck: neck supple and non tender without mass   Pulmonary/Chest: clear to auscultation bilaterally- no wheezes, rales or rhonchi, normal air movement, no respiratory distress  Cardiovascular: normal rate, normal S1 and S2 and no carotid bruits  Abdomen: soft, non-tender, non-distended, normal bowel sounds, no masses or organomegaly  Extremities: no cyanosis, no clubbing and no edema  Neurologic: no cranial nerve deficit and speech normal        Recent Labs     05/26/21  0610 05/26/21  1320 05/27/21  0613    134 138   K 4.2 4.8 3.2*    105 102   CO2 14* 15* 26   BUN 17 19 19   CREATININE 1.4* 1.3* 1.0   GLUCOSE 160* 222* 118*   CALCIUM 8.0* 7.6* 7.1*       Recent Labs     05/25/21  1455 05/26/21  0610 05/27/21  0613   WBC 7.0 28.6* 18.3*   RBC 4.15 3.40* 2.77*   HGB 9.4* 7.9* 6.4*   HCT 32.1* 26.5* 20.2*   MCV 77.3* 77.9* 72.9*   MCH 22.7* 23.2* 23.1*   MCHC 29.3* 29.8* 31.7*   RDW 17.1* 17.3* 17.2*   * 385 203   MPV 10.4 11.2 10.9       Radiology:   EXAMINATION:   DUPLEX VENOUS ULTRASOUND OF THE BILATERAL LOWER EXTREMITIES, 5/25/2021 11:36   pm       TECHNIQUE:   Duplex ultrasound using B-mode/gray scaled imaging and Doppler spectral   analysis and color flow was obtained of the bilateral lower extremities.       COMPARISON:   None.       HISTORY:   ORDERING SYSTEM PROVIDED HISTORY: dvt   TECHNOLOGIST PROVIDED HISTORY:   Reason for exam:->dvt   What reading provider will be dictating this exam?->CRC       FINDINGS:   Limitations: Unable to visualize the right common femoral vein/greater   saphenous vein due to the presence of an IV catheter.  Limited evaluation of   the left calf veins.       The visualized veins of the bilateral lower extremities are patent and free   of echogenic thrombus. The veins demonstrate good compressibility with normal   color flow study and spectral analysis.            Impression   No evidence of DVT in either lower extremity given the limitations described.             EXAMINATION:   CT OF THE ABDOMEN AND PELVIS WITHOUT CONTRAST 5/25/2021 8:26 pm       TECHNIQUE:   CT of the abdomen and pelvis was performed washout CT or   chemical shift MRI recommended.       Other chronic or incidental findings as noted.                 Assessment:    Active Problems:    Septic shock (HCC)  Resolved Problems:    * No resolved hospital problems. *      Plan:  1. Sepsis likely due to complicated UTI  -slowly improving  -enterobacteriaceae and klebsiella on blood cultures  -thus far has received cefepime, ertapenem, vancomycin  -levophed for BP support; wean as tolerated per ICU attending  -continue solucortef  -has art and central lines  -WBC is improving  -ID and ICU following    2. GALEN  -improved  -likely related to sepsis and hypotension  -continue IVF  -continue to monitor GFR and creatinine  -Bicarb improved, bicarb drip discontinued  -Patient remains n.p.o.;  Starting on D5 half-normal saline at 100 cc/h    3.  PE, bialteral  -needed 5L of supplemental O2 via NC on admission, now on 2 L  -anticoagulation with full dose lovenox now on hold    4. Anemia  -hemoccult positive  -currently on GI prophylaxis with protonix  -GI consulted; plan for scope tomorrow  -continue to monitor hemglobin, transfuse for hgb <7  -1 unit PRBCs to be transfused 5/27/21 for Hgb 6.4    5. DM2  -continue basal/bolus/ISS    6. Essential hypertension  -with hypotension due to sepsis  -hold any home BP medications at this time    7. Dementia  -resume home medications    8. Hypokalemia  -replete and monitor    9. Leukocytosis  -secondary to UTI/sepsis  -trending downward today; 28.6 down to 18.3  -continue to treat infection  -some elevation may also be related to steroids. NOTE: This report was transcribed using voice recognition software. Every effort was made to ensure accuracy; however, inadvertent computerized transcription errors may be present.   Electronically signed by Miranda Erickson MD on 5/27/2021 at 7:49 AM

## 2021-05-27 NOTE — ANESTHESIA PRE PROCEDURE
Department of Anesthesiology  Preprocedure Note       Name:  Tsering Kenney   Age:  66 y.o.  :  1945                                          MRN:  [id number]         Date:  2021      Surgeon: Mireya Resendez):  Esperanza Cannon MD    Procedure: Procedure(s):  EGD ESOPHAGOGASTRODUODENOSCOPY    Medications prior to admission:   Prior to Admission medications    Medication Sig Start Date End Date Taking?  Authorizing Provider   insulin glargine (BASAGLAR KWIKPEN) 100 UNIT/ML injection pen Inject 15 Units into the skin nightly   Yes Historical Provider, MD   famotidine (PEPCID) 20 MG tablet Take 20 mg by mouth daily   Yes Historical Provider, MD   ferrous sulfate (IRON 325) 325 (65 Fe) MG tablet Take 325 mg by mouth daily (with breakfast)   Yes Historical Provider, MD   escitalopram (LEXAPRO) 5 MG tablet Take 10 mg by mouth daily   Yes Historical Provider, MD   Umeclidinium Bromide (INCRUSE ELLIPTA) 62.5 MCG/INH AEPB Inhale 1 actuation into the lungs daily   Yes Historical Provider, MD   clopidogrel (PLAVIX) 75 MG tablet Take 75 mg by mouth daily   Yes Historical Provider, MD   magnesium oxide (MAG-OX) 400 MG tablet Take 400 mg by mouth 2 times daily   Yes Historical Provider, MD   metFORMIN (GLUCOPHAGE) 500 MG tablet Take 500 mg by mouth 2 times daily (with meals)   Yes Historical Provider, MD   albuterol sulfate HFA (VENTOLIN HFA) 108 (90 Base) MCG/ACT inhaler Inhale 2 puffs into the lungs every 8 hours   Yes Historical Provider, MD   loperamide (IMODIUM) 2 MG capsule Take 2 mg by mouth every 6 hours as needed for Diarrhea   Yes Historical Provider, MD   meclizine (ANTIVERT) 25 MG tablet Take 25 mg by mouth every 8 hours as needed for Nausea   Yes Historical Provider, MD   aluminum & magnesium hydroxide-simethicone (MAALOX) 200-200-20 MG/5ML SUSP suspension Take 20 mLs by mouth 4 times daily as needed for Indigestion   Yes Historical Provider, MD   acetaminophen (TYLENOL) 325 MG tablet Take 325 mg by mouth every 6 hours as needed for Pain   Yes Historical Provider, MD   fluticasone-salmeterol (WIXELA INHUB) 250-50 MCG/DOSE AEPB Inhale 1 puff into the lungs every 12 hours   Yes Historical Provider, MD   hydrOXYzine (VISTARIL) 25 MG capsule Take 25 mg by mouth 3 times daily as needed for Anxiety   Yes Historical Provider, MD   donepezil (ARICEPT) 5 MG tablet Take 5 mg by mouth nightly   Yes Historical Provider, MD   atorvastatin (LIPITOR) 40 MG tablet Take 40 mg by mouth daily   Yes Historical Provider, MD       Current medications:    Current Facility-Administered Medications   Medication Dose Route Frequency Provider Last Rate Last Admin    0.9 % sodium chloride infusion   Intravenous PRN Kimberley Seymour MD        mupirocin (BACTROBAN) 2 % ointment   Topical BID Keith Alfredo MD   Given at 05/27/21 1144    dextrose 5 % and 0.45 % sodium chloride infusion   Intravenous Continuous Keith Alfredo  mL/hr at 05/27/21 1111 New Bag at 05/27/21 1111    melatonin tablet 3 mg  3 mg Oral Nightly PRN Amanda Cervantes MD        hydrOXYzine (VISTARIL) capsule 25 mg  25 mg Oral TID PRN Dakota Lema MD   25 mg at 05/26/21 0143    fentaNYL (SUBLIMAZE) injection 25 mcg  25 mcg Intravenous Q2H PRN Damian Farias, DO   25 mcg at 05/27/21 1342    LORazepam (ATIVAN) injection 0.5 mg  0.5 mg Intravenous Q6H PRN Damian Farias, DO   0.5 mg at 05/27/21 0202    cefepime (MAXIPIME) 2000 mg IVPB minibag  2,000 mg Intravenous Q12H Keith Alfredo MD 12.5 mL/hr at 05/27/21 1348 2,000 mg at 05/27/21 1348    CEFEPIME flush   Intravenous Q12H Keith Alfredo MD   Stopped at 05/27/21 0748    pantoprazole (PROTONIX) injection 40 mg  40 mg Intravenous BID Damian Farias, DO   40 mg at 05/27/21 0747    And    sodium chloride (PF) 0.9 % injection 10 mL  10 mL Intravenous BID Damian Farias, DO   10 mL at 05/27/21 0748    Arformoterol Tartrate (BROVANA) nebulizer solution 15 mcg  15 mcg Nebulization BID Keith Alfredo MD   15 mcg Penicillins Rash       Problem List:    Patient Active Problem List   Diagnosis Code    Septic shock ([facility] A41.9, R65.21       Past Medical History:        Diagnosis Date    Anemia     [facility]     Anxiety     COPD (chronic obstructive pulmonary disease) ([facility]     COVID-19     Dementia ([facility]     Diabetes mellitus ([facility]     Hypertension     Insomnia     Muscle weakness     SONIYA (obstructive sleep apnea)     Pneumonia due to COVID-19 virus     Respiratory failure (HCC)     TIA (transient ischemic attack)        Past Surgical History:        Procedure Laterality Date    HYSTERECTOMY      JOINT REPLACEMENT      JOINT REPLACEMENT Bilateral     JOINT REPLACEMENT Bilateral     TONSILLECTOMY         Social History:    Social History     Tobacco Use    Smoking status: Never Smoker    Smokeless tobacco: Never Used   Substance Use Topics    Alcohol use: Never                                Counseling given: Not Answered      Vital Signs (Current):   Vitals:    05/27/21 0802 05/27/21 0803 05/27/21 0916 05/27/21 1232   BP:   90/63    Pulse:   77    Resp: (!) 34 20 18 (!) 33   Temp:   36.7 °C (98.1 °F)    TempSrc:       SpO2: 100% 100%  96%   Weight:       Height:                                                  BP Readings from Last 3 Encounters:   05/27/21 90/63       NPO Status:                                                                                 BMI:   Wt Readings from Last 3 Encounters:   05/27/21 173 lb 4.5 oz (78.6 kg)     Body mass index is 33.84 kg/m².     CBC:   Lab Results   Component Value Date    WBC 18.3 05/27/2021    RBC 2.77 05/27/2021    HGB 8.6 05/27/2021    HCT 26.5 05/27/2021    MCV 72.9 05/27/2021    RDW 17.2 05/27/2021     05/27/2021       CMP:   Lab Results   Component Value Date     05/27/2021    K 3.2 05/27/2021     05/27/2021    CO2 26 05/27/2021    BUN 19 05/27/2021    CREATININE 1.0 05/27/2021    GFRAA >60 05/27/2021    LABGLOM 54 05/27/2021    GLUCOSE 118 05/27/2021    PROT 5.4 05/27/2021    CALCIUM 7.1 05/27/2021    BILITOT 0.2 05/27/2021    ALKPHOS 60 05/27/2021    AST 9 05/27/2021    ALT 7 05/27/2021       POC Tests: No results for input(s): POCGLU, POCNA, POCK, POCCL, POCBUN, POCHEMO, POCHCT in the last 72 hours. Coags: No results found for: PROTIME, INR, APTT    HCG (If Applicable): No results found for: PREGTESTUR, PREGSERUM, HCG, HCGQUANT     ABGs: No results found for: PHART, PO2ART, THS0ECU, KKU9ADT, BEART, [id number]     Type & Screen (If Applicable):  No results found for: LABABO, LABRH    Drug/Infectious Status (If Applicable):  No results found for: HIV, HEPCAB    COVID-19 Screening (If Applicable):   Lab Results   Component Value Date    COVID19 Not Detected 05/25/2021           Anesthesia Evaluation  Patient summary reviewed and Nursing notes reviewed  Airway: Mallampati: II  TM distance: >3 FB   Neck ROM: full  Mouth opening: < 3 FB Dental:          Pulmonary:   (+) pneumonia ( COVID. 5l high flow ): resolved,  COPD: moderate,  sleep apnea:  rhonchi ( Left lung field),                             Cardiovascular:    (+) hypertension: moderate, angina:,       ECG reviewed  Rhythm: regular  Rate: normal  Echocardiogram reviewed                  Neuro/Psych:   (+) TIA, psychiatric history ( Anxiety/ Dementia however pt a&ox3 currently):            GI/Hepatic/Renal:        (-) liver disease       Endo/Other:    (+) DiabetesType II DM, , blood dyscrasia: anemia:., .                 Abdominal:   (+) obese,         Vascular: negative vascular ROS. Anesthesia Plan      MAC     ASA 4     (Right groin TLC, A-line, RUE PIV      Resolving urosepsis  DNR CC has been suspended by the POA during the anesthesia procedure.)  Induction: intravenous. MIPS: Prophylactic antiemetics administered. Anesthetic plan and risks discussed with patient and healthcare power of .     Use of blood products

## 2021-05-27 NOTE — CONSULTS
Gastroenterology Consult Note   PEDRO Soliman NP-C with Nikole Glass M.D. Consult Note        Date of Service: 5/27/2021  Reason for Consult: hemoccult +, anemia  Requesting Physician: Dr. Noé Hernandez:  Blood infection, sent in to r/o sepsis    History Obtained From:  Patient, son Isaiah Dumont via telephone, bedside nursing and extensive review of medical records    HISTORY OF PRESENT ILLNESS:       Quay Cranker is a 66 y.o. female with significant past medical history of anemia, COVID, DM, TIA, dementia, HTN, COPD and anxiety admitted via ED for blood infection. Patient sent in to ER for evaluation from nursing home for blood infection. Patient reportedly confused only alert to self while in ER. Per notes she had a temperature of 105.4 upon arrival. Reportedly at facility found to be hypoxic in the 80's. In ER she was hypotensive requiring pressors and admitted to ICU for septic shock, acute respiratory failure with hypoxia and b/l PE's. Per nursing patient had a drop in hemoglobin from 9.4 to 7.9 and hemoccult positive. Patient reportedly had 3 BM's yesterday that \"didn't look positive\" denies melena or hematochezia. Patient A&O X4. Pt reports she had a fall at home and was admitted to [facility] then went to [place] for rehab where she reportedly had a fall again. She reports no history of gross GI bleeding. She denies abdominal pain, nausea, vomiting, dysphagia, epigastric pain, melena or hematochezia. Stating her appetite is ok, no unintentional weight loss, tolerating diet. BM's are daily described as soft and brown. Reports she will take tylenol OTC once \"maybe weekly if I have a headache\", denies anticoagulant use. Denies prior EGD. Reportedly had colonoscopy at [facility] however no reports to prove. Denies history of liver disease or family history of colon or other GI cancers.  Discussed with son Isaiah Dumont via telephone, he denies any similar events in the past, no history of bleeding or ulcers that he is aware of. States he is unsure if she ever had EGD or colonoscopy. Discussed need for EGD given drop in hemoglobin and agrees with plan. Admission labs H&H 9.4/32.1, MCV 77.3, MCH 22.7, MCHC 29.3, platelet 277, sodium 128, potassium 5.3, chloride 95, CO2 14, creatinine 1.3, anion gap 19, lactic acid 6.3, BNP 5,097. CT abdomen/pelvis WO IV contrast- Large gallstones in the dependent portion of the gallbladder. Mostly hypodense and homogeneous right adrenal nodule measuring 2.8 cm in diameter, probable adenoma. Further evaluation with adrenal washout CT or chemical shift MRI recommended. Other chronic or incidental findings as noted. Consultation for hemoccult +, anemia. Currently, pt reports no abdominal pain, nausea or vomiting. Tolerating diet. No BM noted today. Per nursing still on low dose pressors. Labs today H&H 6.4, 20.2, WBC 18.3, potassium 3.2, magnesium 1.2.     Past Medical History:        Diagnosis Date    Anemia     Angina pectoris (HCC)     Anxiety     [facility]     COVID-19     Dementia (HCC)     Diabetes mellitus ([facility]     Hypertension     Insomnia     Muscle weakness     SONIYA (obstructive sleep apnea)     Pneumonia due to COVID-19 virus     Respiratory failure (HCC)     TIA (transient ischemic attack)      Past Surgical History:        Procedure Laterality Date    HYSTERECTOMY      JOINT REPLACEMENT      JOINT REPLACEMENT Bilateral     JOINT REPLACEMENT Bilateral     TONSILLECTOMY       Current Medications:    Current Facility-Administered Medications: 0.9 % sodium chloride infusion, , Intravenous, PRN  melatonin tablet 3 mg, 3 mg, Oral, Nightly PRN  hydrOXYzine (VISTARIL) capsule 25 mg, 25 mg, Oral, TID PRN  fentaNYL (SUBLIMAZE) injection 25 mcg, 25 mcg, Intravenous, Q2H PRN  LORazepam (ATIVAN) injection 0.5 mg, 0.5 mg, Intravenous, Q6H PRN  cefepime (MAXIPIME) 2000 mg IVPB minibag, 2,000 mg, Intravenous, Q12H  CEFEPIME flush, , Intravenous, Q12H  sodium bicarbonate 150 mEq in sterile water infusion, , Intravenous, Continuous  pantoprazole (PROTONIX) injection 40 mg, 40 mg, Intravenous, BID **AND** sodium chloride (PF) 0.9 % injection 10 mL, 10 mL, Intravenous, BID  Arformoterol Tartrate (BROVANA) nebulizer solution 15 mcg, 15 mcg, Nebulization, BID  budesonide (PULMICORT) nebulizer suspension 500 mcg, 0.5 mg, Nebulization, BID  ipratropium-albuterol (DUONEB) nebulizer solution 1 ampule, 1 ampule, Inhalation, Q4H WA  insulin lispro (HUMALOG) injection vial 0-12 Units, 0-12 Units, Subcutaneous, TID WC  insulin lispro (HUMALOG) injection vial 0-6 Units, 0-6 Units, Subcutaneous, Nightly  0.9 % sodium chloride bolus, 250 mL, Intravenous, Once  norepinephrine (LEVOPHED) 16 mg in dextrose 5 % 250 mL infusion, 2-100 mcg/min, Intravenous, Continuous  sodium chloride flush 0.9 % injection 5-40 mL, 5-40 mL, Intravenous, PRN  0.9 % sodium chloride infusion, 25 mL, Intravenous, PRN  enoxaparin (LOVENOX) injection 70 mg, 1 mg/kg, Subcutaneous, Q12H  acetaminophen (TYLENOL) tablet 650 mg, 650 mg, Oral, Q6H PRN **OR** acetaminophen (TYLENOL) suppository 650 mg, 650 mg, Rectal, Q6H PRN  glucose (GLUTOSE) 40 % oral gel 15 g, 15 g, Oral, PRN  dextrose 50 % IV solution, 12.5 g, Intravenous, PRN  glucagon (rDNA) injection 1 mg, 1 mg, Intramuscular, PRN  dextrose 5 % solution, 100 mL/hr, Intravenous, PRN  perflutren lipid microspheres (DEFINITY) injection 1.65 mg, 1.5 mL, Intravenous, ONCE PRN  hydrocortisone sodium succinate PF (SOLU-CORTEF) injection 100 mg, 100 mg, Intravenous, Q8H    Allergies:  Morphine and Penicillins    Social History:    Tobacco:  Pt denies  Alcohol:  Pt denies  Illicit Drugs: Pt denies    Family History:   No known pertinent family history, confirmed by son Brenda Ghotra:    Aside from what was mentioned in the PMH and HPI, essentially unremarkable, all others negative.     PHYSICAL EXAM:      Vitals:    BP (!) 111/45 Pulse 80   Temp 97.7 °F (36.5 °C) (Bladder)   Resp (!) 31   Ht 5' (1.524 m)   Wt 173 lb 4.5 oz (78.6 kg)   SpO2 100%   BMI 33.84 kg/m²       CONSTITUTIONAL:  awake, alert, cooperative, pale, no apparent distress, and appears stated age  EYES:  pupils equal, round and reactive to light, sclera anicteric and conjunctiva normal  ENT:  normocephalic, oral pharynx with dry mucous membranes  NECK:  supple   HEMATOLOGIC/LYMPHATICS:  no cervical lymphadenopathy and no supraclavicular lymphadenopathy  LUNGS:  No increased work of breathing, good air exchange, clear to auscultation bilaterally. CARDIOVASCULAR:  Normal apical impulse, regular rate and rhythm, no murmur noted; 2+ pulses; trace BLE edema, on levophed  ABDOMEN:  normal bowel sounds, soft, non-distended, non-tender, no masses palpated, no hepatosplenomegally  MUSCULOSKELETAL:  full range of motion noted  motor strength is 5 out of 5 all extremities bilaterally, arterial line to RUE and right fem triple lumen.    NEUROLOGIC:  Mental Status Exam:  Level of Alertness:   awake  Orientation:   person, place, time  Motor Exam:  Motor exam is symmetrical 5 out of 5 all extremities bilaterally  SKIN:  Pale skin color, texture, turgor    DATA:    CBC with Differential:    Lab Results   Component Value Date    WBC 18.3 05/27/2021    RBC 2.77 05/27/2021    HGB 6.4 05/27/2021    HCT 20.2 05/27/2021     05/27/2021    MCV 72.9 05/27/2021    MCH 23.1 05/27/2021    MCHC 31.7 05/27/2021    RDW 17.2 05/27/2021    NRBC 0.0 05/26/2021    METASPCT 11.5 05/26/2021    LYMPHOPCT 2.6 05/26/2021    MONOPCT 1.8 05/26/2021    MYELOPCT 6.2 05/26/2021    BASOPCT 0.9 05/26/2021    MONOSABS 0.57 05/26/2021    LYMPHSABS 0.86 05/26/2021    EOSABS 0.26 05/26/2021    BASOSABS 0.26 05/26/2021     CMP:    Lab Results   Component Value Date     05/27/2021    K 3.2 05/27/2021     05/27/2021    CO2 26 05/27/2021    BUN 19 05/27/2021    CREATININE 1.0 05/27/2021    GFRAA >60 05/27/2021    LABGLOM 54 05/27/2021    GLUCOSE 118 05/27/2021    PROT 5.4 05/27/2021    LABALBU 2.4 05/27/2021    CALCIUM 7.1 05/27/2021    BILITOT 0.2 05/27/2021    ALKPHOS 60 05/27/2021    AST 9 05/27/2021    ALT 7 05/27/2021     Hepatic Function Panel:    Lab Results   Component Value Date    ALKPHOS 60 05/27/2021    ALT 7 05/27/2021    AST 9 05/27/2021    PROT 5.4 05/27/2021    BILITOT 0.2 05/27/2021    LABALBU 2.4 05/27/2021   IRON:    Lab Results   Component Value Date    IRON 8 05/26/2021     Iron Saturation:    Lab Results   Component Value Date    LABIRON 4 05/26/2021     TIBC:    Lab Results   Component Value Date    TIBC 197 05/26/2021     FERRITIN:    Lab Results   Component Value Date    FERRITIN 81 05/26/2021         Echo Complete    Result Date: 5/26/2021  Transthoracic Echocardiography Report (TTE)  Demographics   Patient [facility]        Gender            Female                  IRINA   Medical Record  [id number]     Room Number       0207  Number   Account #       [de-identified]    Procedure Date    05/26/2021   Corporate ID                 Ordering                               Physician   Accession       [id number]   Referring         Reagan Cabot MD  Number                       Physician         Zeyad Sumner MD   Date of Birth   1945   Sonographer       Nicole Christensen [facility]   Age             66 year(s)   Interpreting      [address] [place] [address]                               Physician         Physician Cardiology                                                 Dereck Rubin MD                                Any Other  Procedure Type of Study   TTE procedure:Echo Complete W/Doppler & Color Flow. Procedure Date Date: 05/26/2021 Start: 07:40 AM Study Location: Portable Technical Quality: Poor visualization due to patient immobility. Indications:Pulmonary embolus. Patient Status: Routine Contrast Medium: Definity.  Height: 60 inches Weight: 164 pounds BSA: 1.72 m^2 BMI: 32.03 kg/m^2 HR: 90 bpm BP: 100/42 mmHg  Findings   Left Ventricle  Left ventricular internal dimensions were normal in diastole and systole. Borderline concentric left ventricular hypertrophy. Micro-bubble contrast injected to enhance left ventricular visualization. No regional wall motion abnormalities seen. Normal left ventricular ejection fraction. Ejection fraction is visually estimated at 55-60%. Indeterminate diastolic function. Right Ventricle  Normal right ventricular size and function. Left Atrium  Normal sized left atrium. Interatrial septum appears intact. Right Atrium  Normal right atrium size. Mitral Valve  Structurally normal mitral valve. Mild mitral annular calcification. Tricuspid Valve  The tricuspid valve appears structurally normal.   Aortic Valve  The aortic valve appears mildly sclerotic. Pulmonic Valve  Pulmonic valve is structurally normal.   Pericardial Effusion  No evidence of pericardial effusion. Aorta  Aortic root dimension within normal limits. Conclusions   Summary  Left ventricular internal dimensions were normal in diastole and systole. Borderline concentric left ventricular hypertrophy. No regional wall motion abnormalities seen. Normal left ventricular ejection fraction. Mild mitral annular calcification. The aortic valve appears mildly sclerotic.    Signature   ----------------------------------------------------------------  Electronically signed by Marisela Loja MD(Interpreting  physician) on 05/26/2021 07:18 PM  ----------------------------------------------------------------  M-Mode/2D Measurements & Calculations   LV Diastolic    LV Systolic Dimension: 3 cm  AV Cusp Separation: 1.8 cmLA  Dimension: 4.2  LV Volume Diastolic: 06.5 ml Dimension: 3.3 cmAO Root  cm              LV Volume Systolic: 04.4 ml  Dimension: 2.5 cm  LV FS:28.6 %    LV EDV/LV EDV Index: 77.7  LV PW           ml/45 ml/m^2LV ESV/LV ESV  Diastolic: 1.1  Index: 57.1 ml/20ml/ m^2  cm              EF Calculated: 56.6 %        RV Diastolic Dimension: 2.6  LV PW Systolic: LV Mass Index: 91 l/min*m^2  cm  1.3 cm  Septum                                       LA/Aorta: 1.92  Diastolic: 1.1  LVOT: 2 cm                   Ascending Aorta: 2.7 cm  cm                                           LA volume/Index: 37 ml  Septum                                       /20FL/K^6  Systolic: 1.3                                RA Area: 13.2 cm^2  cm  CO: 6.44 l/min                               IVC Expiration: 1.3 cm  CI: 3.74  l/m*m^2  LV Mass: 157.06  g  Doppler Measurements & Calculations   MV Peak E-Wave: 1.04 AV Peak Velocity:     LVOT Peak Velocity: 1.15 m/s  m/s                  1.49 m/s              LVOT Mean Velocity: 0.82 m/s  MV Peak A-Wave: 1.26 AV Peak Gradient:     LVOT Peak Gradient: 5.3  m/s                  8.92 mmHg             mmHgLVOT Mean Gradient: 3 mmHg  MV E/A Ratio: 0.82   AV Mean Velocity:  MV Peak Gradient:    1.03 m/s  6.2 mmHg             AV Mean Gradient: 4.7  MV Mean Gradient:    mmHg                  TR Velocity:2.43 m/s  2.9 mmHg             AV VTI: 24.7 cm       TR Gradient:23.7 mmHg  MV Mean Velocity:    AV Area  0.81 m/s             (Continuity):2.9 cm^2  MV Deceleration  Time: 159.1 msec     LVOT VTI: 22.8 cm  MV P1/2t: 66.7 msec  IVRT: 69.2 msec  MVA by PHT:3.3 cm^2  MV Area  (continuity): 2.6  cm^2  MV E' Septal  Velocity: 0.05 m/s  MV E' Lateral  Velocity: 8 m/s  [email or web address] [id number]    CT ABDOMEN PELVIS WO CONTRAST Additional Contrast? None    Result Date: 5/25/2021  EXAMINATION: CT OF THE ABDOMEN AND PELVIS WITHOUT CONTRAST 5/25/2021 8:26 pm TECHNIQUE: CT of the abdomen and pelvis was performed without the administration of intravenous contrast. Multiplanar reformatted images are provided for review.  Dose modulation, iterative reconstruction, and/or weight based adjustment of the mA/kV was utilized to reduce the radiation dose to as low as reasonably achievable. COMPARISON: None. HISTORY: ORDERING SYSTEM PROVIDED HISTORY: sepsis TECHNOLOGIST PROVIDED HISTORY: Reason for exam:->sepsis Additional Contrast?->None FINDINGS: Lower Chest: Atelectatic changes and pleuroparenchymal scarring in the lung bases, likely chronic. Images significantly degraded by respiratory motion artifact. Organs: Diffuse fatty infiltration of the liver. Large gallstones in the dependent portion of the gallbladder. The spleen, pancreas are within normal limits. Mostly hypodense and homogeneous right adrenal nodule measuring approximately 2.8 cm in diameter, probable adenoma. Further evaluation with adrenal washout CT or chemical shift MRI recommended. There is contrast in the renal collecting systems from prior CTA of the chest.  No evidence of obstructive uropathy. Incidental l renal cysts. Bilateral perinephric stranding, which may be chronic. GI/Bowel: No evidence of bowel obstruction or free intraperitoneal air. No colitis or diverticulitis. No signs of appendicitis. Pelvis: There is prominent artifact in the pelvis secondary to bilateral hip prosthesis. Blair catheter in the decompressed urinary bladder. Peritoneum/Retroperitoneum: Atherosclerotic calcification along the abdominal aorta with no evidence of aneurysmal dilatation. Bones/Soft Tissues: No acute bony pathology. Large gallstones in the dependent portion of the gallbladder. Mostly hypodense and homogeneous right adrenal nodule measuring 2.8 cm in diameter, probable adenoma. Further evaluation with adrenal washout CT or chemical shift MRI recommended. Other chronic or incidental findings as noted.      CT HEAD WO CONTRAST    Result Date: 5/25/2021  EXAMINATION: CT OF THE HEAD WITHOUT CONTRAST  5/25/2021 4:32 pm TECHNIQUE: CT of the head was performed without the administration of intravenous contrast. Dose modulation, iterative reconstruction, and/or The can follow with a KUB which will function like an late IVP imaging for the right kidney. The is a large size gallstone in the upper body of the gallbladder measures 15 mm. There is a hyperdense cyst likely in the upper pole of the left kidney to be further evaluated with ultrasound measuring 18 mm. There is a nodular appearance for the left adrenal gland with relative low density which could represent a nonfunctioning adenoma but proper evaluation will required a adrenal protocol study on routine basis with the CT or MRI. 1.  There are limitation on the present study due motion artifacts caused misregistration of the images as above discussed. 2.  However there appears to be signs for acute/subacute pulmonary embolus in the lower lobe pulmonary arteries more noticeable on the right lower lobe as above commented. Preliminary report given to Dr. Whtiney Natarajan, ER physician borderline. US DUP LOWER EXTREMITIES BILATERAL VENOUS    Result Date: 5/25/2021  EXAMINATION: DUPLEX VENOUS ULTRASOUND OF THE BILATERAL LOWER EXTREMITIES, 5/25/2021 11:36 pm TECHNIQUE: Duplex ultrasound using B-mode/gray scaled imaging and Doppler spectral analysis and color flow was obtained of the bilateral lower extremities. COMPARISON: None. HISTORY: ORDERING SYSTEM PROVIDED HISTORY: dvt TECHNOLOGIST PROVIDED HISTORY: Reason for exam:->dvt What reading provider will be dictating this exam?->CRC FINDINGS: Limitations: Unable to visualize the right common femoral vein/greater saphenous vein due to the presence of an IV catheter. Limited evaluation of the left calf veins. The visualized veins of the bilateral lower extremities are patent and free of echogenic thrombus. The veins demonstrate good compressibility with normal color flow study and spectral analysis. No evidence of DVT in either lower extremity given the limitations described.        IMPRESSION:  · Anemia, microcytic, iron deficiency hemoccult +  · Septic shock,

## 2021-05-27 NOTE — PLAN OF CARE
Problem: Skin Integrity:  Goal: Will show no infection signs and symptoms  Description: Will show no infection signs and symptoms  Outcome: Met This Shift     Problem: Falls - Risk of:  Goal: Will remain free from falls  Description: Will remain free from falls  Outcome: Met This Shift     Problem: ABCDS Injury Assessment  Goal: Absence of physical injury  Outcome: Met This Shift     Problem: Non-Violent Restraints  Goal: Removal from restraints as soon as assessed to be safe  Outcome: Completed  Goal: No harm/injury to patient while restraints in use  Outcome: Completed  Goal: Patient's dignity will be maintained  Outcome: Completed

## 2021-05-27 NOTE — CARE COORDINATION
Continue ICU. Remains on levophed and fentanyl drips. Blood cultures repeated today per ID. Continue iv antibiotics.  Plan remains [address] upon discharge-[facility]IM[facility]

## 2021-05-28 ENCOUNTER — ANESTHESIA (OUTPATIENT)
Dept: ENDOSCOPY | Age: 76
DRG: 871 | End: 2021-05-28
Payer: MEDICARE

## 2021-05-28 VITALS — SYSTOLIC BLOOD PRESSURE: 106 MMHG | OXYGEN SATURATION: 95 % | DIASTOLIC BLOOD PRESSURE: 62 MMHG

## 2021-05-28 LAB
ALBUMIN SERPL-MCNC: 2.4 G/DL (ref 3.5–5.2)
ALP BLD-CCNC: 61 U/L (ref 35–104)
ALT SERPL-CCNC: 7 U/L (ref 0–32)
ANION GAP SERPL CALCULATED.3IONS-SCNC: 10 MMOL/L (ref 7–16)
ANION GAP SERPL CALCULATED.3IONS-SCNC: 10 MMOL/L (ref 7–16)
AST SERPL-CCNC: 8 U/L (ref 0–31)
BASOPHILS ABSOLUTE: 0.03 E9/L (ref 0–0.2)
BASOPHILS RELATIVE PERCENT: 0.2 % (ref 0–2)
BILIRUB SERPL-MCNC: <0.2 MG/DL (ref 0–1.2)
BUN BLDV-MCNC: 15 MG/DL (ref 6–23)
BUN BLDV-MCNC: 16 MG/DL (ref 6–23)
CALCIUM SERPL-MCNC: 7.4 MG/DL (ref 8.6–10.2)
CALCIUM SERPL-MCNC: 7.5 MG/DL (ref 8.6–10.2)
CHLORIDE BLD-SCNC: 100 MMOL/L (ref 98–107)
CHLORIDE BLD-SCNC: 99 MMOL/L (ref 98–107)
CO2: 26 MMOL/L (ref 22–29)
CO2: 26 MMOL/L (ref 22–29)
CREAT SERPL-MCNC: 0.9 MG/DL (ref 0.5–1)
CREAT SERPL-MCNC: 0.9 MG/DL (ref 0.5–1)
CULTURE, BLOOD 2: ABNORMAL
EOSINOPHILS ABSOLUTE: 0 E9/L (ref 0.05–0.5)
EOSINOPHILS RELATIVE PERCENT: 0 % (ref 0–6)
GFR AFRICAN AMERICAN: >60
GFR AFRICAN AMERICAN: >60
GFR NON-AFRICAN AMERICAN: >60 ML/MIN/1.73
GFR NON-AFRICAN AMERICAN: >60 ML/MIN/1.73
GLUCOSE BLD-MCNC: 122 MG/DL (ref 74–99)
GLUCOSE BLD-MCNC: 162 MG/DL (ref 74–99)
HCT VFR BLD CALC: 25.9 % (ref 34–48)
HEMOGLOBIN: 8 G/DL (ref 11.5–15.5)
IMMATURE GRANULOCYTES #: 0.34 E9/L
IMMATURE GRANULOCYTES %: 2 % (ref 0–5)
LACTIC ACID: 1.2 MMOL/L (ref 0.5–2.2)
LACTIC ACID: 2.7 MMOL/L (ref 0.5–2.2)
LACTIC ACID: 2.8 MMOL/L (ref 0.5–2.2)
LACTIC ACID: 3.1 MMOL/L (ref 0.5–2.2)
LACTIC ACID: 3.3 MMOL/L (ref 0.5–2.2)
LYMPHOCYTES ABSOLUTE: 0.87 E9/L (ref 1.5–4)
LYMPHOCYTES RELATIVE PERCENT: 5 % (ref 20–42)
MAGNESIUM: 2.3 MG/DL (ref 1.6–2.6)
MCH RBC QN AUTO: 23.8 PG (ref 26–35)
MCHC RBC AUTO-ENTMCNC: 30.9 % (ref 32–34.5)
MCV RBC AUTO: 77.1 FL (ref 80–99.9)
METER GLUCOSE: 139 MG/DL (ref 74–99)
METER GLUCOSE: 143 MG/DL (ref 74–99)
METER GLUCOSE: 165 MG/DL (ref 74–99)
METER GLUCOSE: 170 MG/DL (ref 74–99)
MONOCYTES ABSOLUTE: 0.45 E9/L (ref 0.1–0.95)
MONOCYTES RELATIVE PERCENT: 2.6 % (ref 2–12)
NEUTROPHILS ABSOLUTE: 15.65 E9/L (ref 1.8–7.3)
NEUTROPHILS RELATIVE PERCENT: 90.2 % (ref 43–80)
ORGANISM: ABNORMAL
ORGANISM: ABNORMAL
PDW BLD-RTO: 17.4 FL (ref 11.5–15)
PHOSPHORUS: 2.4 MG/DL (ref 2.5–4.5)
PLATELET # BLD: 160 E9/L (ref 130–450)
PMV BLD AUTO: ABNORMAL FL (ref 7–12)
POTASSIUM REFLEX MAGNESIUM: 3.5 MMOL/L (ref 3.5–5)
POTASSIUM SERPL-SCNC: 3.6 MMOL/L (ref 3.5–5)
RBC # BLD: 3.36 E12/L (ref 3.5–5.5)
SODIUM BLD-SCNC: 135 MMOL/L (ref 132–146)
SODIUM BLD-SCNC: 136 MMOL/L (ref 132–146)
TOTAL PROTEIN: 5.5 G/DL (ref 6.4–8.3)
WBC # BLD: 17.3 E9/L (ref 4.5–11.5)

## 2021-05-28 PROCEDURE — C9113 INJ PANTOPRAZOLE SODIUM, VIA: HCPCS | Performed by: STUDENT IN AN ORGANIZED HEALTH CARE EDUCATION/TRAINING PROGRAM

## 2021-05-28 PROCEDURE — 2580000003 HC RX 258: Performed by: INTERNAL MEDICINE

## 2021-05-28 PROCEDURE — 6360000002 HC RX W HCPCS: Performed by: INTERNAL MEDICINE

## 2021-05-28 PROCEDURE — 2580000003 HC RX 258: Performed by: NURSE ANESTHETIST, CERTIFIED REGISTERED

## 2021-05-28 PROCEDURE — 6360000002 HC RX W HCPCS: Performed by: STUDENT IN AN ORGANIZED HEALTH CARE EDUCATION/TRAINING PROGRAM

## 2021-05-28 PROCEDURE — 2000000000 HC ICU R&B

## 2021-05-28 PROCEDURE — 0DB58ZX EXCISION OF ESOPHAGUS, VIA NATURAL OR ARTIFICIAL OPENING ENDOSCOPIC, DIAGNOSTIC: ICD-10-PCS | Performed by: INTERNAL MEDICINE

## 2021-05-28 PROCEDURE — 3609012400 HC EGD TRANSORAL BIOPSY SINGLE/MULTIPLE: Performed by: INTERNAL MEDICINE

## 2021-05-28 PROCEDURE — 0DB78ZX EXCISION OF STOMACH, PYLORUS, VIA NATURAL OR ARTIFICIAL OPENING ENDOSCOPIC, DIAGNOSTIC: ICD-10-PCS | Performed by: INTERNAL MEDICINE

## 2021-05-28 PROCEDURE — C9113 INJ PANTOPRAZOLE SODIUM, VIA: HCPCS | Performed by: INTERNAL MEDICINE

## 2021-05-28 PROCEDURE — 6370000000 HC RX 637 (ALT 250 FOR IP): Performed by: INTERNAL MEDICINE

## 2021-05-28 PROCEDURE — 6360000002 HC RX W HCPCS: Performed by: SPECIALIST

## 2021-05-28 PROCEDURE — 36415 COLL VENOUS BLD VENIPUNCTURE: CPT

## 2021-05-28 PROCEDURE — 88305 TISSUE EXAM BY PATHOLOGIST: CPT

## 2021-05-28 PROCEDURE — 85025 COMPLETE CBC W/AUTO DIFF WBC: CPT

## 2021-05-28 PROCEDURE — 83605 ASSAY OF LACTIC ACID: CPT

## 2021-05-28 PROCEDURE — 87081 CULTURE SCREEN ONLY: CPT

## 2021-05-28 PROCEDURE — 80048 BASIC METABOLIC PNL TOTAL CA: CPT

## 2021-05-28 PROCEDURE — 94640 AIRWAY INHALATION TREATMENT: CPT

## 2021-05-28 PROCEDURE — 2700000000 HC OXYGEN THERAPY PER DAY

## 2021-05-28 PROCEDURE — 99232 SBSQ HOSP IP/OBS MODERATE 35: CPT | Performed by: FAMILY MEDICINE

## 2021-05-28 PROCEDURE — 3700000000 HC ANESTHESIA ATTENDED CARE: Performed by: INTERNAL MEDICINE

## 2021-05-28 PROCEDURE — 2709999900 HC NON-CHARGEABLE SUPPLY: Performed by: INTERNAL MEDICINE

## 2021-05-28 PROCEDURE — 84100 ASSAY OF PHOSPHORUS: CPT

## 2021-05-28 PROCEDURE — 0DB98ZX EXCISION OF DUODENUM, VIA NATURAL OR ARTIFICIAL OPENING ENDOSCOPIC, DIAGNOSTIC: ICD-10-PCS | Performed by: INTERNAL MEDICINE

## 2021-05-28 PROCEDURE — 3700000001 HC ADD 15 MINUTES (ANESTHESIA): Performed by: INTERNAL MEDICINE

## 2021-05-28 PROCEDURE — 80053 COMPREHEN METABOLIC PANEL: CPT

## 2021-05-28 PROCEDURE — 6360000002 HC RX W HCPCS: Performed by: NURSE ANESTHETIST, CERTIFIED REGISTERED

## 2021-05-28 PROCEDURE — 2500000003 HC RX 250 WO HCPCS: Performed by: STUDENT IN AN ORGANIZED HEALTH CARE EDUCATION/TRAINING PROGRAM

## 2021-05-28 PROCEDURE — 6370000000 HC RX 637 (ALT 250 FOR IP): Performed by: STUDENT IN AN ORGANIZED HEALTH CARE EDUCATION/TRAINING PROGRAM

## 2021-05-28 PROCEDURE — 36592 COLLECT BLOOD FROM PICC: CPT

## 2021-05-28 PROCEDURE — 2580000003 HC RX 258: Performed by: STUDENT IN AN ORGANIZED HEALTH CARE EDUCATION/TRAINING PROGRAM

## 2021-05-28 PROCEDURE — 2580000003 HC RX 258: Performed by: SPECIALIST

## 2021-05-28 PROCEDURE — 83735 ASSAY OF MAGNESIUM: CPT

## 2021-05-28 PROCEDURE — 2580000003 HC RX 258

## 2021-05-28 PROCEDURE — 82962 GLUCOSE BLOOD TEST: CPT

## 2021-05-28 RX ORDER — POTASSIUM CHLORIDE 29.8 MG/ML
20 INJECTION INTRAVENOUS ONCE
Status: COMPLETED | OUTPATIENT
Start: 2021-05-28 | End: 2021-05-28

## 2021-05-28 RX ORDER — PROPOFOL 10 MG/ML
INJECTION, EMULSION INTRAVENOUS PRN
Status: DISCONTINUED | OUTPATIENT
Start: 2021-05-28 | End: 2021-05-28 | Stop reason: SDUPTHER

## 2021-05-28 RX ORDER — SODIUM CHLORIDE 9 MG/ML
INJECTION, SOLUTION INTRAVENOUS CONTINUOUS PRN
Status: DISCONTINUED | OUTPATIENT
Start: 2021-05-28 | End: 2021-05-28 | Stop reason: SDUPTHER

## 2021-05-28 RX ORDER — SODIUM CHLORIDE 9 MG/ML
INJECTION, SOLUTION INTRAVENOUS CONTINUOUS
Status: DISCONTINUED | OUTPATIENT
Start: 2021-05-28 | End: 2021-05-31

## 2021-05-28 RX ADMIN — IPRATROPIUM BROMIDE AND ALBUTEROL SULFATE 1 AMPULE: 2.5; .5 SOLUTION RESPIRATORY (INHALATION) at 19:58

## 2021-05-28 RX ADMIN — INSULIN LISPRO 2 UNITS: 100 INJECTION, SOLUTION INTRAVENOUS; SUBCUTANEOUS at 08:15

## 2021-05-28 RX ADMIN — FENTANYL CITRATE 25 MCG: 50 INJECTION, SOLUTION INTRAMUSCULAR; INTRAVENOUS at 05:13

## 2021-05-28 RX ADMIN — BUDESONIDE 500 MCG: 0.5 SUSPENSION RESPIRATORY (INHALATION) at 07:55

## 2021-05-28 RX ADMIN — HYDROCORTISONE SODIUM SUCCINATE 100 MG: 100 INJECTION, POWDER, FOR SOLUTION INTRAMUSCULAR; INTRAVENOUS at 05:13

## 2021-05-28 RX ADMIN — INSULIN LISPRO 2 UNITS: 100 INJECTION, SOLUTION INTRAVENOUS; SUBCUTANEOUS at 15:34

## 2021-05-28 RX ADMIN — HYDROXYZINE PAMOATE 25 MG: 25 CAPSULE ORAL at 20:25

## 2021-05-28 RX ADMIN — ARFORMOTEROL TARTRATE 15 MCG: 15 SOLUTION RESPIRATORY (INHALATION) at 07:55

## 2021-05-28 RX ADMIN — ARFORMOTEROL TARTRATE 15 MCG: 15 SOLUTION RESPIRATORY (INHALATION) at 19:58

## 2021-05-28 RX ADMIN — POTASSIUM CHLORIDE 20 MEQ: 29.8 INJECTION, SOLUTION INTRAVENOUS at 11:31

## 2021-05-28 RX ADMIN — SODIUM CHLORIDE, PRESERVATIVE FREE 10 ML: 5 INJECTION INTRAVENOUS at 08:12

## 2021-05-28 RX ADMIN — MUPIROCIN: 20 OINTMENT TOPICAL at 20:25

## 2021-05-28 RX ADMIN — IPRATROPIUM BROMIDE AND ALBUTEROL SULFATE 1 AMPULE: 2.5; .5 SOLUTION RESPIRATORY (INHALATION) at 16:20

## 2021-05-28 RX ADMIN — FENTANYL CITRATE 25 MCG: 50 INJECTION, SOLUTION INTRAMUSCULAR; INTRAVENOUS at 20:33

## 2021-05-28 RX ADMIN — WATER 2 ML: 1 INJECTION INTRAMUSCULAR; INTRAVENOUS; SUBCUTANEOUS at 05:13

## 2021-05-28 RX ADMIN — PROPOFOL 40 MG: 10 INJECTION, EMULSION INTRAVENOUS at 13:44

## 2021-05-28 RX ADMIN — BUDESONIDE 500 MCG: 0.5 SUSPENSION RESPIRATORY (INHALATION) at 19:58

## 2021-05-28 RX ADMIN — IPRATROPIUM BROMIDE AND ALBUTEROL SULFATE 1 AMPULE: 2.5; .5 SOLUTION RESPIRATORY (INHALATION) at 07:55

## 2021-05-28 RX ADMIN — SODIUM CHLORIDE, PRESERVATIVE FREE 10 ML: 5 INJECTION INTRAVENOUS at 16:03

## 2021-05-28 RX ADMIN — WATER 2000 MG: 1 INJECTION INTRAMUSCULAR; INTRAVENOUS; SUBCUTANEOUS at 10:45

## 2021-05-28 RX ADMIN — PANTOPRAZOLE SODIUM 40 MG: 40 INJECTION, POWDER, FOR SOLUTION INTRAVENOUS at 20:25

## 2021-05-28 RX ADMIN — SODIUM CHLORIDE: 9 INJECTION, SOLUTION INTRAVENOUS at 05:22

## 2021-05-28 RX ADMIN — SODIUM CHLORIDE, PRESERVATIVE FREE 10 ML: 5 INJECTION INTRAVENOUS at 20:26

## 2021-05-28 RX ADMIN — POTASSIUM PHOSPHATE, MONOBASIC AND POTASSIUM PHOSPHATE, DIBASIC 15 MMOL: 224; 236 INJECTION, SOLUTION, CONCENTRATE INTRAVENOUS at 10:40

## 2021-05-28 RX ADMIN — HYDROCORTISONE SODIUM SUCCINATE 100 MG: 100 INJECTION, POWDER, FOR SOLUTION INTRAMUSCULAR; INTRAVENOUS at 23:28

## 2021-05-28 RX ADMIN — HYDROCORTISONE SODIUM SUCCINATE 100 MG: 100 INJECTION, POWDER, FOR SOLUTION INTRAMUSCULAR; INTRAVENOUS at 15:31

## 2021-05-28 RX ADMIN — Medication 3 MG: at 20:25

## 2021-05-28 RX ADMIN — SODIUM CHLORIDE, PRESERVATIVE FREE 10 ML: 5 INJECTION INTRAVENOUS at 20:33

## 2021-05-28 RX ADMIN — SODIUM CHLORIDE: 9 INJECTION, SOLUTION INTRAVENOUS at 13:34

## 2021-05-28 RX ADMIN — LORAZEPAM 0.5 MG: 2 INJECTION INTRAMUSCULAR; INTRAVENOUS at 08:12

## 2021-05-28 RX ADMIN — PANTOPRAZOLE SODIUM 40 MG: 40 INJECTION, POWDER, FOR SOLUTION INTRAVENOUS at 08:11

## 2021-05-28 RX ADMIN — MUPIROCIN: 20 OINTMENT TOPICAL at 08:12

## 2021-05-28 RX ADMIN — SODIUM CHLORIDE: 9 INJECTION, SOLUTION INTRAVENOUS at 15:46

## 2021-05-28 RX ADMIN — DEXTROSE AND SODIUM CHLORIDE: 5; 450 INJECTION, SOLUTION INTRAVENOUS at 08:47

## 2021-05-28 RX ADMIN — CEFEPIME HYDROCHLORIDE 2000 MG: 2 INJECTION, POWDER, FOR SOLUTION INTRAVENOUS at 01:42

## 2021-05-28 ASSESSMENT — PAIN SCALES - GENERAL
PAINLEVEL_OUTOF10: 2
PAINLEVEL_OUTOF10: 6
PAINLEVEL_OUTOF10: 5
PAINLEVEL_OUTOF10: 0
PAINLEVEL_OUTOF10: 0

## 2021-05-28 ASSESSMENT — COPD QUESTIONNAIRES: CAT_SEVERITY: MODERATE

## 2021-05-28 ASSESSMENT — PAIN DESCRIPTION - LOCATION: LOCATION: BACK

## 2021-05-28 ASSESSMENT — PAIN DESCRIPTION - ONSET: ONSET: SUDDEN

## 2021-05-28 ASSESSMENT — ENCOUNTER SYMPTOMS
CHEST TIGHTNESS: 0
DIARRHEA: 0
NAUSEA: 0
ABDOMINAL PAIN: 0
COUGH: 0

## 2021-05-28 ASSESSMENT — PAIN DESCRIPTION - DESCRIPTORS: DESCRIPTORS: ACHING;DISCOMFORT;SORE

## 2021-05-28 ASSESSMENT — PAIN - FUNCTIONAL ASSESSMENT: PAIN_FUNCTIONAL_ASSESSMENT: PREVENTS OR INTERFERES SOME ACTIVE ACTIVITIES AND ADLS

## 2021-05-28 ASSESSMENT — PAIN DESCRIPTION - PROGRESSION: CLINICAL_PROGRESSION: RAPIDLY WORSENING

## 2021-05-28 ASSESSMENT — PAIN DESCRIPTION - ORIENTATION: ORIENTATION: LOWER;LEFT

## 2021-05-28 ASSESSMENT — PAIN DESCRIPTION - FREQUENCY: FREQUENCY: CONTINUOUS

## 2021-05-28 ASSESSMENT — PAIN DESCRIPTION - PAIN TYPE: TYPE: ACUTE PAIN

## 2021-05-28 NOTE — BRIEF OP NOTE
Brief Postoperative Note    Avril Sauceda  YOB: 1945  [id number]    Procedure: EGD with biopsy    Anesthesia: [facility]    Surgeon:  Francisco Damian MD    Findings:       Esophagus:  GERD      Stomach:  Gastritis bx done to rule out H. Pylori      Duodenum: duodenitis    Bx. done to rule out sprue.  Small non bleeding AVM       Complications: None      Estimated blood loss: none      Dee Weston MD

## 2021-05-28 NOTE — PROGRESS NOTES
supple and non tender without mass   Pulmonary/Chest: clear to auscultation bilaterally- no wheezes, rales or rhonchi, normal air movement, no respiratory distress  Cardiovascular: normal rate, normal S1 and S2 and no carotid bruits  Abdomen: soft, non-tender, non-distended, normal bowel sounds, no masses or organomegaly  Extremities: no cyanosis, no clubbing and no edema  Neurologic: no cranial nerve deficit and speech normal        Recent Labs     05/26/21  1320 05/27/21  0613 05/28/21  0607    138 135   K 4.8 3.2* 3.5    102 99   CO2 15* 26 26   BUN 19 19 16   CREATININE 1.3* 1.0 0.9   GLUCOSE 222* 118* 162*   CALCIUM 7.6* 7.1* 7.4*       Recent Labs     05/26/21  0610 05/27/21  0613 05/27/21  1315 05/27/21  1902 05/28/21  0607   WBC 28.6* 18.3*  --   --  17.3*   RBC 3.40* 2.77*  --   --  3.36*   HGB 7.9* 6.4* 8.6* 7.9* 8.0*   HCT 26.5* 20.2* 26.5* 24.4* 25.9*   MCV 77.9* 72.9*  --   --  77.1*   MCH 23.2* 23.1*  --   --  23.8*   MCHC 29.8* 31.7*  --   --  30.9*   RDW 17.3* 17.2*  --   --  17.4*    203  --   --  160   MPV 11.2 10.9  --   --  NOT CALC       Radiology:   EXAMINATION:   DUPLEX VENOUS ULTRASOUND OF THE BILATERAL LOWER EXTREMITIES, 5/25/2021 11:36   pm       TECHNIQUE:   Duplex ultrasound using B-mode/gray scaled imaging and Doppler spectral   analysis and color flow was obtained of the bilateral lower extremities.       COMPARISON:   None.       HISTORY:   ORDERING SYSTEM PROVIDED HISTORY: dvt   TECHNOLOGIST PROVIDED HISTORY:   Reason for exam:->dvt   What reading provider will be dictating this exam?->CRC       FINDINGS:   Limitations: Unable to visualize the right common femoral vein/greater   saphenous vein due to the presence of an IV catheter.  Limited evaluation of   the left calf veins.       The visualized veins of the bilateral lower extremities are patent and free   of echogenic thrombus.  The veins demonstrate good compressibility with normal   color flow study and spectral analysis.         Impression   No evidence of DVT in either lower extremity given the limitations described.             EXAMINATION:   CT OF THE ABDOMEN AND PELVIS WITHOUT CONTRAST 5/25/2021 8:26 pm       TECHNIQUE:   CT of the abdomen and pelvis was performed without the administration of   intravenous contrast. Multiplanar reformatted images are provided for review. Dose modulation, iterative reconstruction, and/or weight based adjustment of   the mA/kV was utilized to reduce the radiation dose to as low as reasonably   achievable.       COMPARISON:   None.       HISTORY:   ORDERING SYSTEM PROVIDED HISTORY: sepsis   TECHNOLOGIST PROVIDED HISTORY:   Reason for exam:->sepsis   Additional Contrast?->None       FINDINGS:   Lower Chest: Atelectatic changes and pleuroparenchymal scarring in the lung   bases, likely chronic.       Images significantly degraded by respiratory motion artifact.       Organs: Diffuse fatty infiltration of the liver.       Large gallstones in the dependent portion of the gallbladder.       The spleen, pancreas are within normal limits.       Mostly hypodense and homogeneous right adrenal nodule measuring approximately   2.8 cm in diameter, probable adenoma.  Further evaluation with adrenal   washout CT or chemical shift MRI recommended.       There is contrast in the renal collecting systems from prior CTA of the   chest.  No evidence of obstructive uropathy.  Incidental l renal cysts. Bilateral perinephric stranding, which may be chronic.       GI/Bowel: No evidence of bowel obstruction or free intraperitoneal air.       No colitis or diverticulitis.       No signs of appendicitis.       Pelvis:  There is prominent artifact in the pelvis secondary to bilateral hip   prosthesis.       Blair catheter in the decompressed urinary bladder.       Peritoneum/Retroperitoneum: Atherosclerotic calcification along the abdominal   aorta with no evidence of aneurysmal dilatation.     however, inadvertent computerized transcription errors may be present.   Electronically signed by Lauren Liu MD on 5/28/2021 at 12:51 PM

## 2021-05-28 NOTE — PROGRESS NOTES
Critical Care Team - Daily Progress Note      Date and time: 2021 7:39 AM  Patient's name:  John Johnson  Medical Record Number: [id number]  Age: 66 y.o. Date of Admission: 2021  2:10 PM  Length of stay during current admission: 3  Primary Care Physician: Andre Van MD  Code Status: Limited      Reason for admission: Septic Shock, Acute respiratory failure with hypoxia, Bilateral PE    Interval HPI    : No overnight events. Urine output 700 ml  : No overnight events. GENERAL/LINES:  CVC Triple Lumen 21  Peripheral IV 21 Left Hand  Peripheral IV 21 Right Antecubital  Arterial Line 21 Right Radial  Urethral Catheter Straight-tip 16 fr    VITALS:  /62   Pulse 62   Temp 98.2 °F (36.8 °C) (Bladder)   Resp 17   Ht 5' (1.524 m)   Wt 178 lb 9.2 oz (81 kg)   SpO2 94%   BMI 34.88 kg/m²   Tmax over 24 hours:  Temp (24hrs), Av.1 °F (36.7 °C), Min:97.7 °F (36.5 °C), Max:98.2 °F (36.8 °C)    Patient Vitals for the past 6 hrs:   BP Temp Temp src Pulse Resp SpO2 Weight   21 0700 116/62 -- -- 62 17 94 % --   21 0600 (!) 102/53 -- -- 64 17 95 % --   21 0500 (!) 106/55 -- -- 61 13 98 % --   21 0400 (!) 101/52 98.2 °F (36.8 °C) Bladder 63 16 96 % 178 lb 9.2 oz (81 kg)   21 0300 (!) 103/51 -- -- 75 13 99 % --   21 0250 (!) 102/55 -- -- 66 -- -- --   21 0200 (!) 93/45 -- -- 67 16 96 % --       Intake/Output Summary (Last 24 hours) at 2021 0739  Last data filed at 2021 0600  Gross per 24 hour   Intake 4586.01 ml   Output 1025 ml   Net 3561.01 ml     Wt Readings from Last 2 Encounters:   21 178 lb 9.2 oz (81 kg)     Body mass index is 34.88 kg/m².       PAST MEDICAL HISTORY:      Diagnosis Date    Anemia     Angina pectoris (HCC)     Anxiety     COPD (chronic obstructive pulmonary disease) ([facility]     COVID-19     Dementia (HCC)     Diabetes mellitus ([facility]     Hypertension     Insomnia     Muscle weakness     SONIYA (obstructive sleep apnea)     Pneumonia due to COVID-19 virus     Respiratory failure (HCC)     TIA (transient ischemic attack)       PAST SURGICAL HISTORY:      Procedure Laterality Date    HYSTERECTOMY      JOINT REPLACEMENT      JOINT REPLACEMENT Bilateral     JOINT REPLACEMENT Bilateral     TONSILLECTOMY       VENT SETTINGS  Vent Information  SpO2: 94 %  Additional Respiratory  Assessments  Pulse: 62  Resp: 17  SpO2: 94 %  Oral Care: Mouth swabbed, Mouth moisturizer    Review of Systems   Constitutional: Negative for activity change and fever. Respiratory: Negative for cough and chest tightness. Cardiovascular: Negative for chest pain, palpitations and leg swelling. Gastrointestinal: Negative for abdominal pain, diarrhea and nausea. Musculoskeletal: Negative for arthralgias and myalgias. Neurological: Negative for dizziness, syncope, weakness and headaches. Psychiatric/Behavioral: Negative for agitation. The patient is not nervous/anxious. Physical Exam:    General appearance - alert, well appearing, and in no distress and oriented to person, place, and time. Mental status - alert, oriented to person, place, and time, normal mood, behavior, speech, dress, motor activity, and thought processes  Eyes - pupils equal and reactive, extraocular eye movements intact, sclera anicteric  Ears - external ear normal  Nose - normal and patent, no erythema, discharge or polyps  Mouth - mucous membranes moist, pharynx normal without lesions  Neck - supple, no significant adenopathy  Chest - clear to auscultation, no wheezes, rales or rhonchi, symmetric air entry  Heart - normal rate, regular rhythm, normal S1, S2, no murmurs, rubs, clicks or gallops  Abdomen - soft, nontender, nondistended, no masses or organomegaly  Neurological - alert, oriented, normal speech, no focal findings or movement disorder noted  Extremities - peripheral pulses normal, no clubbing or cyanosis. No edema.   Skin - normal coloration and turgor, no rashes, no suspicious skin lesions noted      Medications:    Scheduled Meds:   mupirocin   Topical BID    cefepime  2,000 mg Intravenous Q12H    pantoprazole  40 mg Intravenous BID    And    sodium chloride (PF)  10 mL Intravenous BID    Arformoterol Tartrate  15 mcg Nebulization BID    budesonide  0.5 mg Nebulization BID    ipratropium-albuterol  1 ampule Inhalation Q4H WA    insulin lispro  0-12 Units Subcutaneous TID WC    insulin lispro  0-6 Units Subcutaneous Nightly    sodium chloride  250 mL Intravenous Once    [Held by provider] enoxaparin  1 mg/kg Subcutaneous Q12H    hydrocortisone sodium succinate PF  100 mg Intravenous Q8H     Continuous Infusions:   sodium chloride      dextrose 5 % and 0.45 % NaCl 100 mL/hr at 05/27/21 2236    sodium chloride Stopped (05/28/21 0716)    norepinephrine 4 mcg/min (05/27/21 1039)    sodium chloride      dextrose       PRN Meds:   sodium chloride, , PRN  melatonin, 3 mg, Nightly PRN  hydrOXYzine, 25 mg, TID PRN  fentanNYL, 25 mcg, Q2H PRN  LORazepam, 0.5 mg, Q6H PRN  sodium chloride flush, 5-40 mL, PRN  sodium chloride, 25 mL, PRN  acetaminophen, 650 mg, Q6H PRN   Or  acetaminophen, 650 mg, Q6H PRN  glucose, 15 g, PRN  dextrose, 12.5 g, PRN  glucagon (rDNA), 1 mg, PRN  dextrose, 100 mL/hr, PRN  perflutren lipid microspheres, 1.5 mL, ONCE PRN    Labs:    Complete Blood Count:   Recent Labs     05/26/21  0610 05/27/21  0613 05/27/21  1315 05/27/21  1902 05/28/21  0607   WBC 28.6* 18.3*  --   --  17.3*   HGB 7.9* 6.4* 8.6* 7.9* 8.0*   HCT 26.5* 20.2* 26.5* 24.4* 25.9*    203  --   --  160        Last 3 Blood Glucose:   Recent Labs     05/26/21  0610 05/26/21  1320 05/27/21  0613   GLUCOSE 160* 222* 118*        PT/INR:  No results found for: PROTIME, INR  PTT:  No results found for: APTT, PTT    Comprehensive Metabolic Profile:   Recent Labs     05/26/21  0610 05/26/21  1320 05/27/21  0613    134 138   K 4.2 4.8 3.2*    105 102   CO2 14* 15* 26   BUN 17 19 19   CREATININE 1.4* 1.3* 1.0   GLUCOSE 160* 222* 118*   CALCIUM 8.0* 7.6* 7.1*   PROT 6.3*  --  5.4*   LABALBU 2.7*  --  2.4*   BILITOT 0.3  --  0.2   ALKPHOS 78  --  60   AST 14  --  9   ALT 10  --  7      Magnesium:   Lab Results   Component Value Date    MG 1.2 05/27/2021     Phosphorus: No results found for: PHOS  Ionized Calcium: No results found for: CAION     Troponin: No results for input(s): TROPONINI in the last 72 hours. ABG: No results for input(s): PH, PCO2, PO2, HCO3, BE, O2SAT in the last 72 hours. Radiology/Imaging:   Echo Complete    Result Date: 5/26/2021  Transthoracic Echocardiography Report (TTE)  Demographics   Patient Name    Clarence Theo        Gender            Female                  IRINA   Medical Record  [id number]     Room Number       0207  Number   Account #       [de-identified]    Procedure Date    05/26/2021   Corporate ID                 Ordering                               Physician   Accession       [id number]   Referring         Macie Swann MD  Number                       Physician         Rohit Fung MD   Date of Birth   1945   Sonographer       Nicole NEW   Age             66 year(s)   Interpreting      [address] [place] [address]                               Physician         Physician Cardiology                                                 Arron Collado MD                                Any Other  Procedure Type of Study   TTE procedure:Echo Complete W/Doppler & Color Flow. Procedure Date Date: 05/26/2021 Start: 07:40 AM Study Location: Portable Technical Quality: Poor visualization due to patient immobility. Indications:Pulmonary embolus. Patient Status: Routine Contrast Medium: Definity. Height: 60 inches Weight: 164 pounds BSA: 1.72 m^2 BMI: 32.03 kg/m^2 HR: 90 bpm BP: 100/42 mmHg  Findings   Left Ventricle  Left ventricular internal dimensions were normal in diastole and systole. Borderline concentric left ventricular hypertrophy. Micro-bubble contrast injected to enhance left ventricular visualization. No regional wall motion abnormalities seen. Normal left ventricular ejection fraction. Ejection fraction is visually estimated at 55-60%. Indeterminate diastolic function. Right Ventricle  Normal right ventricular size and function. Left Atrium  Normal sized left atrium. Interatrial septum appears intact. Right Atrium  Normal right atrium size. Mitral Valve  Structurally normal mitral valve. Mild mitral annular calcification. Tricuspid Valve  The tricuspid valve appears structurally normal.   Aortic Valve  The aortic valve appears mildly sclerotic. Pulmonic Valve  Pulmonic valve is structurally normal.   Pericardial Effusion  No evidence of pericardial effusion. Aorta  Aortic root dimension within normal limits. Conclusions   Summary  Left ventricular internal dimensions were normal in diastole and systole. Borderline concentric left ventricular hypertrophy. No regional wall motion abnormalities seen. Normal left ventricular ejection fraction. Mild mitral annular calcification. The aortic valve appears mildly sclerotic.    Signature   ----------------------------------------------------------------  Electronically signed by Prosper Lynch MD(Interpreting  physician) on 05/26/2021 07:18 PM  ----------------------------------------------------------------  M-Mode/2D Measurements & Calculations   LV Diastolic    LV Systolic Dimension: 3 cm  AV Cusp Separation: 1.8 cmLA  Dimension: 4.2  LV Volume Diastolic: 55.6 ml Dimension: 3.3 cmAO Root  cm              LV Volume Systolic: 87.0 ml  Dimension: 2.5 cm  LV FS:28.6 %    LV EDV/LV EDV Index: 77.7  LV PW           ml/45 ml/m^2LV ESV/LV ESV  Diastolic: 1.1  Index: 21.1 ml/20ml/ m^2  cm              EF Calculated: 56.6 %        RV Diastolic Dimension: 2.6  LV PW Systolic: LV Mass Index: 91 l/min*m^2  cm  1.3 cm  Septum                                       LA/Aorta: 1.32 Diastolic: 1.1  LVOT: 2 cm                   Ascending Aorta: 2.7 cm  cm                                           LA volume/Index: 37 ml  Septum                                       /12TM/L^1  Systolic: 1.3                                RA Area: 13.2 cm^2  cm  CO: 6.44 l/min                               IVC Expiration: 1.3 cm  CI: 3.74  l/m*m^2  LV Mass: 157.06  g  Doppler Measurements & Calculations   MV Peak E-Wave: 1.04 AV Peak Velocity:     LVOT Peak Velocity: 1.15 m/s  m/s                  1.49 m/s              LVOT Mean Velocity: 0.82 m/s  MV Peak A-Wave: 1.26 AV Peak Gradient:     LVOT Peak Gradient: 5.3  m/s                  8.92 mmHg             mmHgLVOT Mean Gradient: 3 mmHg  MV E/A Ratio: 0.82   AV Mean Velocity:  MV Peak Gradient:    1.03 m/s  6.2 mmHg             AV Mean Gradient: 4.7  MV Mean Gradient:    mmHg                  TR Velocity:2.43 m/s  2.9 mmHg             AV VTI: 24.7 cm       TR Gradient:23.7 mmHg  MV Mean Velocity:    AV Area  0.81 m/s             (Continuity):2.9 cm^2  MV Deceleration  Time: 159.1 msec     LVOT VTI: 22.8 cm  MV P1/2t: 66.7 msec  IVRT: 69.2 msec  MVA by PHT:3.3 cm^2  MV Area  (continuity): 2.6  cm^2  MV E' Septal  Velocity: 0.05 m/s  MV E' Lateral  Velocity: 8 m/s  [id number]    CT ABDOMEN PELVIS WO CONTRAST Additional Contrast? None    Result Date: 5/25/2021  EXAMINATION: CT OF THE ABDOMEN AND PELVIS WITHOUT CONTRAST 5/25/2021 8:26 pm TECHNIQUE: CT of the abdomen and pelvis was performed without the administration of intravenous contrast. Multiplanar reformatted images are provided for review. Dose modulation, iterative reconstruction, and/or weight based adjustment of the mA/kV was utilized to reduce the radiation dose to as low as reasonably achievable. COMPARISON: None.  HISTORY: ORDERING SYSTEM PROVIDED HISTORY: sepsis TECHNOLOGIST PROVIDED HISTORY: Reason for exam:->sepsis Additional Contrast?->None FINDINGS: Lower Chest: Atelectatic changes and pleuroparenchymal scarring in the lung bases, likely chronic. Images significantly degraded by respiratory motion artifact. Organs: Diffuse fatty infiltration of the liver. Large gallstones in the dependent portion of the gallbladder. The spleen, pancreas are within normal limits. Mostly hypodense and homogeneous right adrenal nodule measuring approximately 2.8 cm in diameter, probable adenoma. Further evaluation with adrenal washout CT or chemical shift MRI recommended. There is contrast in the renal collecting systems from prior CTA of the chest.  No evidence of obstructive uropathy. Incidental l renal cysts. Bilateral perinephric stranding, which may be chronic. GI/Bowel: No evidence of bowel obstruction or free intraperitoneal air. No colitis or diverticulitis. No signs of appendicitis. Pelvis: There is prominent artifact in the pelvis secondary to bilateral hip prosthesis. Blair catheter in the decompressed urinary bladder. Peritoneum/Retroperitoneum: Atherosclerotic calcification along the abdominal aorta with no evidence of aneurysmal dilatation. Bones/Soft Tissues: No acute bony pathology. Large gallstones in the dependent portion of the gallbladder. Mostly hypodense and homogeneous right adrenal nodule measuring 2.8 cm in diameter, probable adenoma. Further evaluation with adrenal washout CT or chemical shift MRI recommended. Other chronic or incidental findings as noted. CT HEAD WO CONTRAST    Result Date: 5/25/2021  EXAMINATION: CT OF THE HEAD WITHOUT CONTRAST  5/25/2021 4:32 pm TECHNIQUE: CT of the head was performed without the administration of intravenous contrast. Dose modulation, iterative reconstruction, and/or weight based adjustment of the mA/kV was utilized to reduce the radiation dose to as low as reasonably achievable. COMPARISON: None.  HISTORY: ORDERING SYSTEM measures 15 mm. There is a hyperdense cyst likely in the upper pole of the left kidney to be further evaluated with ultrasound measuring 18 mm. There is a nodular appearance for the left adrenal gland with relative low density which could represent a nonfunctioning adenoma but proper evaluation will required a adrenal protocol study on routine basis with the CT or MRI. 1.  There are limitation on the present study due motion artifacts caused misregistration of the images as above discussed. 2.  However there appears to be signs for acute/subacute pulmonary embolus in the lower lobe pulmonary arteries more noticeable on the right lower lobe as above commented. Preliminary report given to Dr. Julian Rodríguez, ER physician borderline. US DUP LOWER EXTREMITIES BILATERAL VENOUS    Result Date: 5/25/2021  EXAMINATION: DUPLEX VENOUS ULTRASOUND OF THE BILATERAL LOWER EXTREMITIES, 5/25/2021 11:36 pm TECHNIQUE: Duplex ultrasound using B-mode/gray scaled imaging and Doppler spectral analysis and color flow was obtained of the bilateral lower extremities. COMPARISON: None. HISTORY: ORDERING SYSTEM PROVIDED HISTORY: dvt TECHNOLOGIST PROVIDED HISTORY: Reason for exam:->dvt What reading provider will be dictating this exam?->CRC FINDINGS: Limitations: Unable to visualize the right common femoral vein/greater saphenous vein due to the presence of an IV catheter. Limited evaluation of the left calf veins. The visualized veins of the bilateral lower extremities are patent and free of echogenic thrombus. The veins demonstrate good compressibility with normal color flow study and spectral analysis. No evidence of DVT in either lower extremity given the limitations described. Chest Xray (5/28/2021):Pulmonary vascular congestive changes.     ASSESSMENT & PLAN:     NEURO:    Neuro Intact  -Continue to monitor      RESPIRATORY:    Acute Respiratory failure with hypoxia  NC 4 L   -Brovana 15 mcg BID  -Pulmicort 500 mcg BID  -Duonebs Q4HR  -Wean oxygen as tolerated. Keep O2 sat 90-92%     Bilateral PE  DVTs absent in lower extremities  -Lovenox 1mg/kg held    CARDIOVASCULAR:    Shock, Sepsis  NICOM non-responsive, LVEF 55-60%  -Off Levo  -D5 1/2  ml/hr  -Solu-Cortef 100 mg Q8HR (Day 3)  -Continue to wean as tolerated     GI:    GI Prophylaxis   -Protonix 40 mg BID    Heme occult positive   -EGD today  -GI Consulted, appreciate recs     RENAL:    GALEN  Resolved  -Continue to monitor  -D5 1/2  ml/hr    Hypophosphatemia  -Potassium phosphate 15 mmol ordered  -Continue to monitor    Hypomagnesemia  -Replace as needed  -Continue to monitor    INFECTIOUS DISEASE:    Sepsis  WBC 17.3, Lactate 2.7, Blood cultures positive for Klebsiella pneumoniae, gram negative rods, received cefepime, vancomycin and ertapenem  -Cefepime 1000 mg Q12HR (Day 4)  -ID Consulted, appreciate recs     HEME/ONC:    Anemia  Hb 7.9->6.4->8.0, received 1 unit of PRBC on 05/27  -H&H daily  -Lovenox 1mg/kg held  -Continue to monitor and replace if <7     ENDOCRINE:     T2DM    -MDSSI  -Hypoglycemia protocol    SOCIAL:    -Limited code  -Palliative Care consulted  -Fentanyl for chronic pain  -Ativan for anxiety      Karthik Valerio MD, PGY-1                5/28/2021, 7:39 AM    I personally saw, examined and provided care for the patient. Radiographs, labs and medication list were reviewed by me independently. I spoke with bedside nursing, therapists and consultants. Critical care services and times documented are independent of procedures and multidisciplinary rounds with Residents. Additionally comprehensive, multidisciplinary rounds were conducted with the MICU team. The case was discussed in detail and plans for care were established. Review of Residents documentation was conducted and revisions were made as appropriate. I agree with the above documented exam, problem list and plan of care.       Egd today   Stop anticoagulation     Jah Rodrigez JAYCEE Mcnair.    Pulmonary/Critical Care Medicine   35 min cct excluding procedures

## 2021-05-28 NOTE — PLAN OF CARE
Problem: Skin Integrity:  Goal: Will show no infection signs and symptoms  Description: Will show no infection signs and symptoms  Outcome: Met This Shift     Problem: Falls - Risk of:  Goal: Will remain free from falls  Description: Will remain free from falls  Outcome: Met This Shift     Problem: ABCDS Injury Assessment  Goal: Absence of physical injury  Outcome: Met This Shift     Problem: Cardiac Output - Decreased:  Goal: Hemodynamic stability will improve  Description: Hemodynamic stability will improve  Outcome: Ongoing     Problem: Gas Exchange - Impaired:  Goal: Levels of oxygenation will improve  Description: Levels of oxygenation will improve  Outcome: Ongoing     Problem: Anxiety:  Goal: Level of anxiety will decrease  Description: Level of anxiety will decrease  Outcome: Ongoing

## 2021-05-28 NOTE — PROGRESS NOTES
[address] [place] [address] [facility]  Progress Note    SUBJECTIVE:  Chief Complaint   Patient presents with    Blood Infection     sent in for sepsis rule out     Patient is still in the ICU. Tolerating antibiotics. No fever. She is off vasopressors. Review of systems:  As stated above in the chief complaint, otherwise negative. Medications:  Scheduled Meds:   potassium phosphate IVPB  15 mmol Intravenous Once    mupirocin   Topical BID    cefepime  2,000 mg Intravenous Q12H    pantoprazole  40 mg Intravenous BID    And    sodium chloride (PF)  10 mL Intravenous BID    Arformoterol Tartrate  15 mcg Nebulization BID    budesonide  0.5 mg Nebulization BID    ipratropium-albuterol  1 ampule Inhalation Q4H WA    insulin lispro  0-12 Units Subcutaneous TID WC    insulin lispro  0-6 Units Subcutaneous Nightly    sodium chloride  250 mL Intravenous Once    [Held by provider] enoxaparin  1 mg/kg Subcutaneous Q12H    hydrocortisone sodium succinate PF  100 mg Intravenous Q8H     Continuous Infusions:   sodium chloride      dextrose 5 % and 0.45 % NaCl 100 mL/hr at 21 0847    sodium chloride Stopped (21 0716)    norepinephrine Stopped (21 0801)    sodium chloride      dextrose       PRN Meds:sodium chloride, melatonin, hydrOXYzine, fentanNYL, LORazepam, sodium chloride flush, sodium chloride, acetaminophen **OR** acetaminophen, glucose, dextrose, glucagon (rDNA), dextrose, perflutren lipid microspheres    OBJECTIVE:  /62   Pulse 62   Temp 98.2 °F (36.8 °C) (Bladder)   Resp 19   Ht 5' (1.524 m)   Wt 178 lb 9.2 oz (81 kg)   SpO2 99%   BMI 34.88 kg/m²   Temp  Av.1 °F (36.7 °C)  Min: 97.7 °F (36.5 °C)  Max: 98.2 °F (36.8 °C)  Constitutional: The patient is lying in bed in the ICU. She is resting comfortably. No distress. Skin: Warm and dry. No rashes were noted. HEENT: Round and reactive pupils. Moist mucous membranes.   No ulcerations or thrush. Neck: Supple to movements. Chest: No respiratory distress. No crackles. Cardiovascular: Heart sounds rhythmic and regular. Abdomen: Positive bowel sounds to auscultation. Benign to palpation. Extremities: No edema. Bilateral TKR. Lines: Right femoral TLC 5/25/2021. Blair catheter. Laboratory and Tests Review:  Lab Results   Component Value Date    WBC 17.3 (H) 05/28/2021    WBC 18.3 (H) 05/27/2021    WBC 28.6 (H) 05/26/2021    HGB 8.0 (L) 05/28/2021    HCT 25.9 (L) 05/28/2021    MCV 77.1 (L) 05/28/2021     05/28/2021     Lab Results   Component Value Date    NEUTROABS 15.65 (H) 05/28/2021    NEUTROABS 16.65 (H) 05/27/2021    NEUTROABS 26.88 (H) 05/26/2021     No results found for: CRP  Lab Results   Component Value Date    ALT 7 05/28/2021    AST 8 05/28/2021    ALKPHOS 61 05/28/2021    BILITOT <0.2 05/28/2021     Lab Results   Component Value Date     05/28/2021    K 3.5 05/28/2021    CL 99 05/28/2021    CO2 26 05/28/2021    BUN 16 05/28/2021    CREATININE 0.9 05/28/2021    CREATININE 1.0 05/27/2021    CREATININE 1.3 05/26/2021    GFRAA >60 05/28/2021    LABGLOM >60 05/28/2021    GLUCOSE 162 05/28/2021    PROT 5.5 05/28/2021    LABALBU 2.4 05/28/2021    CALCIUM 7.4 05/28/2021    BILITOT <0.2 05/28/2021    ALKPHOS 61 05/28/2021    AST 8 05/28/2021    ALT 7 05/28/2021     Lab Results   Component Value Date    CRP 22.1 (H) 05/27/2021     No results found for: [address]  Radiology:      Microbiology:   Blood cultures 5/25/2021: Ampicillin resistant Klebsiella pneumoniae in 4 of 4 bottles  Blood cultures 5/27/2021: Negative so far  Nares screen MRSA: Positive  Urine culture ordered but never sent    ASSESSMENT:  · Complicated UTI with sepsis secondary to Klebsiella  · Klebsiella septicemia associated to complicated UTI  · Septic shock  · Leukocytosis and fever secondary to sepsis.   Patient is on steroids    PLAN:  · Change antibiotics to Ceftriaxone  · Repeat blood culture x1  · Transfer out of the ICU  · We will follow with you    Spoke with nursing    Gayla Sesay MD  10:01 AM  5/28/2021

## 2021-05-28 NOTE — PROGRESS NOTES
.  Intensive Care Daily Quality Rounding Checklist      ICU Team Members: bedside  Nurse, charge nurse, RT, clinical pharmacist, Warner Runner, residents    ICU Day #: NUMBER: 4    Intubation Date:  N/A    Ventilator Day #: N/A    Central Line Insertion Date: May 25,2021        Day #: NUMBER: 4     Arterial Line Insertion Date: N/A      Day #: N/A    Temporary Hemodialysis Catheter Insertion Date:  N/A      Day # N/A    DVT Prophylaxis: Lovenox (on hold)    GI Prophylaxis: Protonix    Blair Catheter Insertion Date: May 25,2021       Day #: 4      Continued need (if yes, reason documented and discussed with physician): yes, strict I&O in critical patient    Skin Issues/ Wounds and ordered treatment discussed on rounds: no issues SOS precautions    Goals/ Plans for the Day: monitor labs and vitals,trend H&H, wean Levophed as able, EGD today,replace electrolytes

## 2021-05-28 NOTE — OP NOTE
[address] [address]                                OPERATIVE REPORT    PATIENT NAME: Diamond Reyes                     :        1945  MED REC NO:   [id number]                            ROOM:       0207  ACCOUNT NO:   [de-identified]                           ADMIT DATE: 2021  PROVIDER:     Elda Alston MD    DATE OF PROCEDURE:  2021    PREOPERATIVE DIAGNOSIS:  Anemia. POSTOPERATIVE DIAGNOSIS:  Grade B LA classification GERD, small hiatal  hernia. Stomach showed gastritis, biopsied to rule out H. pylori  infection. Duodenum showed duodenitis with small AVM, biopsied to rule  out sprue. No active bleeding seen. PROCEDURE PERFORMED:  Upper endoscopy with biopsy. ANESTHESIA:  LMAC. ESTIMATED BLOOD LOSS:  N/A    NOTE:  Prior to the procedure an informed consent was obtained from the  patient after explaining the benefits as well as the risks,  alternatives, and complications of the procedure to the patient, who  understood and agreed. PROCEDURE:  With the patient in the left lateral decubitus position, the  Olympus GIF-100 forward-viewing videoscope was introduced into the  esophagus, the evaluation of which showed grade B LA classification GERD  and small hiatal hernia. The scope was then advanced through the gastroesophageal junction into  the gastric body, along the greater curvature. Evaluation of the body  of the stomach showed gastritis, biopsied to rule out H. pylori  infection. The scope was then advanced through the pylorus into the duodenal bulb  and second portion of the duodenum. Duodenum showed nodular duodenitis  and small AVM. Biopsies were done to rule out sprue. The scope was  then retrieved and retroflexed in the prepyloric antrum, with thorough  evaluation of the cardiac and fundal portions of the stomach, which  appeared to be within normal limits.     The scope

## 2021-05-28 NOTE — PLAN OF CARE
Problem: Skin Integrity:  Goal: Will show no infection signs and symptoms  Description: Will show no infection signs and symptoms  5/28/2021 0253 by Jah Appiah RN  Outcome: Met This Shift  5/27/2021 2027 by Daphnie Flores RN  Outcome: Met This Shift  Goal: Absence of new skin breakdown  Description: Absence of new skin breakdown  Outcome: Met This Shift     Problem: Falls - Risk of:  Goal: Will remain free from falls  Description: Will remain free from falls  5/28/2021 0253 by Jah Appiah RN  Outcome: Met This Shift  5/27/2021 2027 by Daphnie Flores RN  Outcome: Met This Shift  Goal: Absence of physical injury  Description: Absence of physical injury  Outcome: Met This Shift     Problem: ABCDS Injury Assessment  Goal: Absence of physical injury  5/28/2021 0253 by Jah Appiah RN  Outcome: Met This Shift  5/27/2021 2027 by Daphnie Flores RN  Outcome: Met This Shift     Problem: Cardiac Output - Decreased:  Goal: Hemodynamic stability will improve  Description: Hemodynamic stability will improve  5/28/2021 0253 by Jah Appiah RN  Outcome: Met This Shift  5/27/2021 2027 by Daphnie Flores RN  Outcome: Ongoing     Problem: Gas Exchange - Impaired:  Goal: Levels of oxygenation will improve  Description: Levels of oxygenation will improve  5/28/2021 0253 by Jah Appiah RN  Outcome: Met This Shift  5/27/2021 2027 by Daphnie Flores RN  Outcome: Ongoing     Problem: Anxiety:  Goal: Level of anxiety will decrease  Description: Level of anxiety will decrease  5/28/2021 0253 by Jah Appiah RN  Outcome: Met This Shift  5/27/2021 2027 by Daphnie Flores RN  Outcome: Ongoing

## 2021-05-28 NOTE — FLOWSHEET NOTE
.  Stage  CI HR SV SVI TFC   Baseline 2.4 77  32 62.3   Challenge 2.3 75  31 63.1   Result (%Ä) -3.8 -2.3  -1.5 1.4

## 2021-05-28 NOTE — PROGRESS NOTES
sodium chloride bolus, Once  sodium chloride flush 0.9 % injection 5-40 mL, PRN  0.9 % sodium chloride infusion, PRN  [Held by provider] enoxaparin (LOVENOX) injection 70 mg, Q12H  acetaminophen (TYLENOL) tablet 650 mg, Q6H PRN   Or  acetaminophen (TYLENOL) suppository 650 mg, Q6H PRN  glucose (GLUTOSE) 40 % oral gel 15 g, PRN  dextrose 50 % IV solution, PRN  glucagon (rDNA) injection 1 mg, PRN  dextrose 5 % solution, PRN  perflutren lipid microspheres (DEFINITY) injection 1.65 mg, ONCE PRN  hydrocortisone sodium succinate PF (SOLU-CORTEF) injection 100 mg, Q8H         Data Review  CBC:   Lab Results   Component Value Date    WBC 11.0 05/29/2021    RBC 3.27 05/29/2021    HGB 7.8 05/29/2021    HCT 25.9 05/29/2021    MCV 79.2 05/29/2021    MCH 23.9 05/29/2021    MCHC 30.1 05/29/2021    RDW 17.4 05/29/2021     05/29/2021    MPV NOT CALC 05/29/2021     CMP:    Lab Results   Component Value Date     05/29/2021    K 3.5 05/29/2021     05/29/2021    CO2 27 05/29/2021    BUN 14 05/29/2021    CREATININE 0.9 05/29/2021    GFRAA >60 05/29/2021    LABGLOM >60 05/29/2021    GLUCOSE 129 05/29/2021    PROT 5.5 05/29/2021    LABALBU 2.7 05/29/2021    CALCIUM 7.7 05/29/2021    BILITOT <0.2 05/29/2021    ALKPHOS 61 05/29/2021    AST 7 05/29/2021    ALT 6 05/29/2021     Hepatic Function Panel:    Lab Results   Component Value Date    ALKPHOS 61 05/29/2021    ALT 6 05/29/2021    AST 7 05/29/2021    PROT 5.5 05/29/2021    BILITOT <0.2 05/29/2021    LABALBU 2.7 05/29/2021     IRON:    Lab Results   Component Value Date    IRON 8 05/26/2021     Iron Saturation:  No components found for: PERCENTFE  FERRITIN:    Lab Results   Component Value Date    FERRITIN 81 05/26/2021         Assessment:     Active Problems:  ? Anemia, microcytic, iron deficiency hemoccult +  ? Septic shock, Klebsiella bacteremia, off pressors- ID following  ? Leukocytosis - ID following  ? Fever -resolved  ? Lactic acidosis -resolved  ?  Acute respiratory failure  ? Bilateral PE - defer to PCP  ? Dementia  ? Hx COVID  ? EGD with bx 21 - Grade B LA classification GERD, small hiatal hernia. Stomach showed gastritis, biopsy negative for H. Pylori infection. Duodenum showed duodenitis with small AVM, biopsy pending to rule out sprue. No active bleeding seen. Plan:     ? Critical care management per ICU, for tx to step down today  ? Serial H&H, transfuse per PCP  ? Continue Protonix 40 MG BID  ? Antibiotics per ID  ? Monitor CBC, CMP daily  ? Supportive care  ? Continue to monitor    Note: This report was completed utilizing computer voice recognition software. Every effort has been made to ensure accuracy, however; inadvertent computerized transcription errors may be present.      Discussed with Dr. Baldomero Ren per Dr. Nilsa Neal [id number], FNP-BC 2021 4:06 PM For Dr. Jeffrey Arroyo

## 2021-05-28 NOTE — PLAN OF CARE
Problem: Altered GI function (NC-1.4)  Goal: Food and/or Nutrient Delivery  Description: Individualized approach for food/nutrient provision.   Outcome: Ongoing

## 2021-05-28 NOTE — PROGRESS NOTES
5/28/2021  9:58 AM      Comprehensive Nutrition Assessment    Type and Reason for Visit:  Initial (ICU LOS)    Nutrition Recommendations/Plan: ADAT when medically feasible and will initiate ONS at that time    Nutrition Assessment:  Pt admit from NH 2/2 septic shock w/complicated UTI, bilateral PE. Current pressor support. Hx DM, COPD, dementia. Currently NPO for EGD today 5/28 d/t hemoccult +stools and dropping H/H. Will monitor NPO status and add ONS when diet adv. Malnutrition Assessment:  Malnutrition Status: At risk for malnutrition (Comment)    Context:  Acute Illness     Findings of the 6 clinical characteristics of malnutrition:  Energy Intake:  Mild decrease in energy intake (Comment) (poor appetite and NPO now for EGD)  Weight Loss:  No significant weight loss     Body Fat Loss:  No significant body fat loss     Muscle Mass Loss:  No significant muscle mass loss    Fluid Accumulation:  No significant fluid accumulation     Strength:  Not Performed    Estimated Daily Nutrient Needs:  Energy (kcal):  ; Weight Used for Energy Requirements:  Admission     Protein (g):  60-70 (1.3-1.5 g/kg);  Weight Used for Protein Requirements:  Ideal        Fluid (ml/day):  Per Critical Care; Method Used for Fluid Requirements:  Other (Comment)      Nutrition Related Findings:  confused on admit, poor appetite, pressor (MAP 67), edentulous, hypo BS, trace edema, +I/O 7L      Wounds:   (several areas of blanchable redness)       Current Nutrition Therapies:    Diet NPO, After Midnight Exceptions are: Sips with Meds    Anthropometric Measures:  · Height: 5' (152.4 cm)  · Current Body Weight: 178 lb (80.7 kg) (5/28 bedscale)   · Admission Body Weight: 165 lb (74.8 kg) (5/26 measured/method not specified)    · Usual Body Weight: 183 lb 10.3 oz (83.3 kg) (per EMR x 6 mo when admit for Covid+ PNA)     · Ideal Body Weight: 100 lbs; % Ideal Body Weight 178 %   · BMI: 34.8  · BMI Categories: Obese Class 1 (BMI 30.0-34.9) (32.2 at admit wt)       Nutrition Diagnosis:   · Inadequate oral intake related to altered GI function (GIB) as evidenced by NPO or clear liquid status due to medical condition, GI abnormality, intake 0-25%      Nutrition Interventions:   Food and/or Nutrient Delivery:  Continue NPO (ADAT s/p procedure and will add ONS at that time)  Nutrition Education/Counseling:  Education not indicated (NH resident)   Coordination of Nutrition Care:  Continue to monitor while inpatient    Goals:  Nutrition progression       Nutrition Monitoring and Evaluation:   Behavioral-Environmental Outcomes:  None Identified   Food/Nutrient Intake Outcomes:  Diet Advancement/Tolerance  Physical Signs/Symptoms Outcomes:  GI Status, Hemodynamic Status, Fluid Status or Edema, Biochemical Data, Nutrition Focused Physical Findings, Skin, Weight     Discharge Planning:     Too soon to determine     Electronically signed by Jennifer Mendieta RD, CNSC, LD on 5/28/21 at 9:58 AM EDT    Contact: [phone]

## 2021-05-29 LAB
ALBUMIN SERPL-MCNC: 2.7 G/DL (ref 3.5–5.2)
ALP BLD-CCNC: 61 U/L (ref 35–104)
ALT SERPL-CCNC: 6 U/L (ref 0–32)
ANION GAP SERPL CALCULATED.3IONS-SCNC: 7 MMOL/L (ref 7–16)
AST SERPL-CCNC: 7 U/L (ref 0–31)
BASOPHILS ABSOLUTE: 0 E9/L (ref 0–0.2)
BASOPHILS RELATIVE PERCENT: 0 % (ref 0–2)
BILIRUB SERPL-MCNC: <0.2 MG/DL (ref 0–1.2)
BUN BLDV-MCNC: 14 MG/DL (ref 6–23)
CALCIUM SERPL-MCNC: 7.7 MG/DL (ref 8.6–10.2)
CHLORIDE BLD-SCNC: 104 MMOL/L (ref 98–107)
CLOTEST: NORMAL
CO2: 27 MMOL/L (ref 22–29)
CREAT SERPL-MCNC: 0.9 MG/DL (ref 0.5–1)
DOHLE BODIES: ABNORMAL
EOSINOPHILS ABSOLUTE: 0 E9/L (ref 0.05–0.5)
EOSINOPHILS RELATIVE PERCENT: 0 % (ref 0–6)
GFR AFRICAN AMERICAN: >60
GFR NON-AFRICAN AMERICAN: >60 ML/MIN/1.73
GLUCOSE BLD-MCNC: 129 MG/DL (ref 74–99)
HCT VFR BLD CALC: 25.9 % (ref 34–48)
HEMOGLOBIN: 7.8 G/DL (ref 11.5–15.5)
HYPOCHROMIA: ABNORMAL
LACTIC ACID: 1.1 MMOL/L (ref 0.5–2.2)
LYMPHOCYTES ABSOLUTE: 0.44 E9/L (ref 1.5–4)
LYMPHOCYTES RELATIVE PERCENT: 4.3 % (ref 20–42)
MAGNESIUM: 2 MG/DL (ref 1.6–2.6)
MCH RBC QN AUTO: 23.9 PG (ref 26–35)
MCHC RBC AUTO-ENTMCNC: 30.1 % (ref 32–34.5)
MCV RBC AUTO: 79.2 FL (ref 80–99.9)
METER GLUCOSE: 138 MG/DL (ref 74–99)
METER GLUCOSE: 159 MG/DL (ref 74–99)
METER GLUCOSE: 159 MG/DL (ref 74–99)
MONOCYTES ABSOLUTE: 0.11 E9/L (ref 0.1–0.95)
MONOCYTES RELATIVE PERCENT: 0.9 % (ref 2–12)
NEUTROPHILS ABSOLUTE: 10.45 E9/L (ref 1.8–7.3)
NEUTROPHILS RELATIVE PERCENT: 94.8 % (ref 43–80)
NUCLEATED RED BLOOD CELLS: 0 /100 WBC
OVALOCYTES: ABNORMAL
PDW BLD-RTO: 17.4 FL (ref 11.5–15)
PHOSPHORUS: 3.1 MG/DL (ref 2.5–4.5)
PLATELET # BLD: 149 E9/L (ref 130–450)
PMV BLD AUTO: ABNORMAL FL (ref 7–12)
POIKILOCYTES: ABNORMAL
POLYCHROMASIA: ABNORMAL
POTASSIUM REFLEX MAGNESIUM: 3.5 MMOL/L (ref 3.5–5)
RBC # BLD: 3.27 E12/L (ref 3.5–5.5)
SCHISTOCYTES: ABNORMAL
SODIUM BLD-SCNC: 138 MMOL/L (ref 132–146)
TOTAL PROTEIN: 5.5 G/DL (ref 6.4–8.3)
WBC # BLD: 11 E9/L (ref 4.5–11.5)

## 2021-05-29 PROCEDURE — 85025 COMPLETE CBC W/AUTO DIFF WBC: CPT

## 2021-05-29 PROCEDURE — 2580000003 HC RX 258: Performed by: INTERNAL MEDICINE

## 2021-05-29 PROCEDURE — 83735 ASSAY OF MAGNESIUM: CPT

## 2021-05-29 PROCEDURE — 80053 COMPREHEN METABOLIC PANEL: CPT

## 2021-05-29 PROCEDURE — 6360000002 HC RX W HCPCS: Performed by: INTERNAL MEDICINE

## 2021-05-29 PROCEDURE — 84100 ASSAY OF PHOSPHORUS: CPT

## 2021-05-29 PROCEDURE — 99232 SBSQ HOSP IP/OBS MODERATE 35: CPT | Performed by: FAMILY MEDICINE

## 2021-05-29 PROCEDURE — 36415 COLL VENOUS BLD VENIPUNCTURE: CPT

## 2021-05-29 PROCEDURE — C9113 INJ PANTOPRAZOLE SODIUM, VIA: HCPCS | Performed by: INTERNAL MEDICINE

## 2021-05-29 PROCEDURE — 6370000000 HC RX 637 (ALT 250 FOR IP): Performed by: INTERNAL MEDICINE

## 2021-05-29 PROCEDURE — 2700000000 HC OXYGEN THERAPY PER DAY

## 2021-05-29 PROCEDURE — 82962 GLUCOSE BLOOD TEST: CPT

## 2021-05-29 PROCEDURE — 36592 COLLECT BLOOD FROM PICC: CPT

## 2021-05-29 PROCEDURE — 2060000000 HC ICU INTERMEDIATE R&B

## 2021-05-29 PROCEDURE — 94640 AIRWAY INHALATION TREATMENT: CPT

## 2021-05-29 PROCEDURE — 83605 ASSAY OF LACTIC ACID: CPT

## 2021-05-29 RX ADMIN — PANTOPRAZOLE SODIUM 40 MG: 40 INJECTION, POWDER, FOR SOLUTION INTRAVENOUS at 08:47

## 2021-05-29 RX ADMIN — HYDROCORTISONE SODIUM SUCCINATE 100 MG: 100 INJECTION, POWDER, FOR SOLUTION INTRAMUSCULAR; INTRAVENOUS at 22:20

## 2021-05-29 RX ADMIN — ARFORMOTEROL TARTRATE 15 MCG: 15 SOLUTION RESPIRATORY (INHALATION) at 20:01

## 2021-05-29 RX ADMIN — MUPIROCIN: 20 OINTMENT TOPICAL at 20:16

## 2021-05-29 RX ADMIN — PANTOPRAZOLE SODIUM 40 MG: 40 INJECTION, POWDER, FOR SOLUTION INTRAVENOUS at 20:14

## 2021-05-29 RX ADMIN — SODIUM CHLORIDE, PRESERVATIVE FREE 10 ML: 5 INJECTION INTRAVENOUS at 20:15

## 2021-05-29 RX ADMIN — SODIUM CHLORIDE, PRESERVATIVE FREE 10 ML: 5 INJECTION INTRAVENOUS at 08:46

## 2021-05-29 RX ADMIN — HYDROCORTISONE SODIUM SUCCINATE 100 MG: 100 INJECTION, POWDER, FOR SOLUTION INTRAMUSCULAR; INTRAVENOUS at 06:33

## 2021-05-29 RX ADMIN — SODIUM CHLORIDE: 9 INJECTION, SOLUTION INTRAVENOUS at 01:29

## 2021-05-29 RX ADMIN — Medication 3 MG: at 20:14

## 2021-05-29 RX ADMIN — WATER 2000 MG: 1 INJECTION INTRAMUSCULAR; INTRAVENOUS; SUBCUTANEOUS at 10:40

## 2021-05-29 RX ADMIN — SODIUM CHLORIDE, PRESERVATIVE FREE 10 ML: 5 INJECTION INTRAVENOUS at 06:33

## 2021-05-29 RX ADMIN — HYDROCORTISONE SODIUM SUCCINATE 100 MG: 100 INJECTION, POWDER, FOR SOLUTION INTRAMUSCULAR; INTRAVENOUS at 14:01

## 2021-05-29 RX ADMIN — ARFORMOTEROL TARTRATE 15 MCG: 15 SOLUTION RESPIRATORY (INHALATION) at 07:39

## 2021-05-29 RX ADMIN — BUDESONIDE 500 MCG: 0.5 SUSPENSION RESPIRATORY (INHALATION) at 20:01

## 2021-05-29 RX ADMIN — FENTANYL CITRATE 25 MCG: 50 INJECTION, SOLUTION INTRAMUSCULAR; INTRAVENOUS at 10:53

## 2021-05-29 RX ADMIN — MUPIROCIN: 20 OINTMENT TOPICAL at 08:46

## 2021-05-29 RX ADMIN — IPRATROPIUM BROMIDE AND ALBUTEROL SULFATE 1 AMPULE: 2.5; .5 SOLUTION RESPIRATORY (INHALATION) at 07:39

## 2021-05-29 RX ADMIN — IPRATROPIUM BROMIDE AND ALBUTEROL SULFATE 1 AMPULE: 2.5; .5 SOLUTION RESPIRATORY (INHALATION) at 20:01

## 2021-05-29 RX ADMIN — BUDESONIDE 500 MCG: 0.5 SUSPENSION RESPIRATORY (INHALATION) at 07:39

## 2021-05-29 RX ADMIN — SODIUM CHLORIDE: 9 INJECTION, SOLUTION INTRAVENOUS at 10:48

## 2021-05-29 RX ADMIN — FENTANYL CITRATE 25 MCG: 50 INJECTION, SOLUTION INTRAMUSCULAR; INTRAVENOUS at 20:15

## 2021-05-29 ASSESSMENT — ENCOUNTER SYMPTOMS
CHEST TIGHTNESS: 0
ABDOMINAL PAIN: 0
COUGH: 0
NAUSEA: 0
DIARRHEA: 0

## 2021-05-29 ASSESSMENT — PAIN DESCRIPTION - PAIN TYPE
TYPE: ACUTE PAIN
TYPE: ACUTE PAIN

## 2021-05-29 ASSESSMENT — PAIN SCALES - GENERAL
PAINLEVEL_OUTOF10: 0
PAINLEVEL_OUTOF10: 7
PAINLEVEL_OUTOF10: 0
PAINLEVEL_OUTOF10: 0
PAINLEVEL_OUTOF10: 7

## 2021-05-29 ASSESSMENT — PAIN DESCRIPTION - DESCRIPTORS
DESCRIPTORS: ACHING;NAGGING;DISCOMFORT
DESCRIPTORS: ACHING

## 2021-05-29 ASSESSMENT — PAIN DESCRIPTION - ORIENTATION
ORIENTATION: MID;LOWER
ORIENTATION: MID;LOWER

## 2021-05-29 ASSESSMENT — PAIN DESCRIPTION - LOCATION
LOCATION: BACK
LOCATION: BACK

## 2021-05-29 ASSESSMENT — PAIN DESCRIPTION - PROGRESSION: CLINICAL_PROGRESSION: NOT CHANGED

## 2021-05-29 ASSESSMENT — PAIN DESCRIPTION - FREQUENCY: FREQUENCY: CONTINUOUS

## 2021-05-29 ASSESSMENT — PAIN DESCRIPTION - ONSET: ONSET: ON-GOING

## 2021-05-29 NOTE — PROGRESS NOTES
injection vial 0-6 Units, Nightly  0.9 % sodium chloride bolus, Once  sodium chloride flush 0.9 % injection 5-40 mL, PRN  0.9 % sodium chloride infusion, PRN  [Held by provider] enoxaparin (LOVENOX) injection 70 mg, Q12H  acetaminophen (TYLENOL) tablet 650 mg, Q6H PRN   Or  acetaminophen (TYLENOL) suppository 650 mg, Q6H PRN  glucose (GLUTOSE) 40 % oral gel 15 g, PRN  dextrose 50 % IV solution, PRN  glucagon (rDNA) injection 1 mg, PRN  dextrose 5 % solution, PRN  perflutren lipid microspheres (DEFINITY) injection 1.65 mg, ONCE PRN  hydrocortisone sodium succinate PF (SOLU-CORTEF) injection 100 mg, Q8H         Data Review  CBC:   Lab Results   Component Value Date    WBC 9.9 05/30/2021    RBC 3.32 05/30/2021    HGB 7.8 05/30/2021    HCT 26.2 05/30/2021    MCV 78.9 05/30/2021    MCH 23.5 05/30/2021    MCHC 29.8 05/30/2021    RDW 17.5 05/30/2021     05/30/2021    MPV 11.7 05/30/2021     CMP:    Lab Results   Component Value Date     05/30/2021    K 3.6 05/30/2021     05/30/2021    CO2 24 05/30/2021    BUN 14 05/30/2021    CREATININE 0.8 05/30/2021    GFRAA >60 05/30/2021    LABGLOM >60 05/30/2021    GLUCOSE 144 05/30/2021    PROT 5.8 05/30/2021    LABALBU 2.8 05/30/2021    CALCIUM 8.1 05/30/2021    BILITOT 0.2 05/30/2021    ALKPHOS 86 05/30/2021    AST 10 05/30/2021    ALT 8 05/30/2021     Hepatic Function Panel:    Lab Results   Component Value Date    ALKPHOS 86 05/30/2021    ALT 8 05/30/2021    AST 10 05/30/2021    PROT 5.8 05/30/2021    BILITOT 0.2 05/30/2021    LABALBU 2.8 05/30/2021     IRON:    Lab Results   Component Value Date    IRON 8 05/26/2021     FERRITIN:    Lab Results   Component Value Date    FERRITIN 81 05/26/2021         Assessment:     Active Problems:  ? Anemia, microcytic, iron deficiency hemoccult +  ? Septic shock, Klebsiella bacteremia, off pressors- ID following  ? Leukocytosis - ID following  ? Fever -resolved  ? Lactic acidosis -resolved  ?  Acute respiratory failure  ? Bilateral PE - defer to PCP  ? Dementia  ? Hx COVID  ? EGD with bx 5/28/21 - Grade B LA classification GERD, small hiatal hernia. Stomach showed gastritis, biopsy negative for H. Pylori infection. Duodenum showed duodenitis with small AVM, biopsy pending to rule out sprue.  No active bleeding seen. Plan:     ? Serial H&H, transfuse per PCP  ? Continue Protonix 40 MG BID  ? Antibiotics per ID  ? Monitor CBC, CMP daily  ? Colonoscopy to be done, timing to be determined dependent on oxygen requirements and shortness of breath - Hgb currently stable, if stays stable may plan for outpatient as pt does not want currently  ? Supportive care  ? Continue to monitor    Note: This report was completed utilizing computer voice recognition software. Every effort has been made to ensure accuracy, however; inadvertent computerized transcription errors may be present.      Discussed with Dr. Yoana Lara per Dr. Elizabeth Cedeno [id number], FNP-BC 5/30/2021 12:24 PM For Dr. Yessenia Foote

## 2021-05-29 NOTE — PROGRESS NOTES
Critical Care Team - Daily Progress Note      Date and time: 2021 8:20 AM  Patient's name:  Lor Luong  Medical Record Number: [id number]  Age: 66 y.o. Date of Admission: 2021  2:10 PM  Length of stay during current admission: 4  Primary Care Physician: Yuliya Zazueta MD  Code Status: Limited      Reason for admission: Septic Shock, Acute respiratory failure with hypoxia, Bilateral PE    Interval HPI    : No overnight events. Urine output 700 ml  : No overnight events. : Agitated overnight. GENERAL/LINES:  CVC Triple Lumen 21  Peripheral IV 21 Left Hand  Peripheral IV 21 Right Antecubital  Urethral Catheter Straight-tip 16 fr    VITALS:  BP (!) 107/54   Pulse 64   Temp 98.7 °F (37.1 °C) (Bladder)   Resp (!) 36   Ht 5' (1.524 m)   Wt 180 lb 1.9 oz (81.7 kg)   SpO2 100%   BMI 35.18 kg/m²   Tmax over 24 hours:  Temp (24hrs), Av.4 °F (36.9 °C), Min:98.1 °F (36.7 °C), Max:98.7 °F (37.1 °C)    Patient Vitals for the past 6 hrs:   BP Temp Temp src Pulse Resp SpO2 Weight   21 0744 -- -- -- -- (!) 36 100 % --   21 0741 -- -- -- -- 23 100 % --   21 0739 -- -- -- -- 24 97 % --   21 0700 (!) 107/54 -- -- 64 19 96 % --   21 0600 (!) 110/57 -- -- 60 28 95 % 180 lb 1.9 oz (81.7 kg)   21 0500 (!) 104/51 -- -- 81 27 93 % --   21 0400 (!) 104/53 98.7 °F (37.1 °C) Bladder 62 25 93 % --   21 0300 (!) 99/51 -- -- 65 (!) 37 91 % --       Intake/Output Summary (Last 24 hours) at 2021 0820  Last data filed at 2021 0600  Gross per 24 hour   Intake 2955 ml   Output 1135 ml   Net 1820 ml     Wt Readings from Last 2 Encounters:   21 180 lb 1.9 oz (81.7 kg)     Body mass index is 35.18 kg/m².       PAST MEDICAL HISTORY:      Diagnosis Date    Anemia     Angina pectoris (HCC)     Anxiety     COPD (chronic obstructive pulmonary disease) ([facility]     COVID-19     Dementia ([facility]     Diabetes mellitus ([facility]     Hypertension Insomnia     Muscle weakness     SONIYA (obstructive sleep apnea)     Pneumonia due to COVID-19 virus     Respiratory failure (HCC)     TIA (transient ischemic attack)       PAST SURGICAL HISTORY:      Procedure Laterality Date    HYSTERECTOMY      JOINT REPLACEMENT      JOINT REPLACEMENT Bilateral     JOINT REPLACEMENT Bilateral     TONSILLECTOMY      UPPER GASTROINTESTINAL ENDOSCOPY N/A 5/28/2021    EGD BIOPSY performed by Joy Brandon MD at [address] Information  SpO2: 100 %  Additional Respiratory  Assessments  Pulse: 64  Resp: (!) 36  SpO2: 100 %  Oral Care: Mouth swabbed    Review of Systems   Constitutional: Negative for activity change and fever. Respiratory: Negative for cough and chest tightness. Cardiovascular: Negative for chest pain, palpitations and leg swelling. Gastrointestinal: Negative for abdominal pain, diarrhea and nausea. Musculoskeletal: Negative for arthralgias and myalgias. Neurological: Negative for dizziness, syncope, weakness and headaches. Psychiatric/Behavioral: Negative for agitation. The patient is not nervous/anxious. Physical Exam:    General appearance - alert, well appearing, and in no distress and oriented to person, place, and time.    Mental status - alert, oriented to person, place, and time, normal mood, behavior, speech, dress, motor activity, and thought processes  Eyes - pupils equal and reactive, extraocular eye movements intact, sclera anicteric  Ears - external ear normal  Nose - normal and patent, no erythema, discharge or polyps  Mouth - mucous membranes moist, pharynx normal without lesions  Neck - supple, no significant adenopathy  Chest - expiratory wheezing and rales in the upper lung field, clear to auscultation, symmetric air entry  Heart - normal rate, regular rhythm, normal S1, S2, no murmurs, rubs, clicks or gallops  Abdomen - soft, nontender, nondistended, no masses or organomegaly  Neurological - alert, oriented, normal speech, no focal findings or movement disorder noted  Extremities - peripheral pulses normal, no clubbing or cyanosis. No edema.   Skin - normal coloration and turgor, no rashes, no suspicious skin lesions noted      Medications:    Scheduled Meds:   cefTRIAXone (ROCEPHIN) IV  2,000 mg Intravenous Q24H    mupirocin   Topical BID    pantoprazole  40 mg Intravenous BID    And    sodium chloride (PF)  10 mL Intravenous BID    Arformoterol Tartrate  15 mcg Nebulization BID    budesonide  0.5 mg Nebulization BID    ipratropium-albuterol  1 ampule Inhalation Q4H WA    insulin lispro  0-12 Units Subcutaneous TID WC    insulin lispro  0-6 Units Subcutaneous Nightly    sodium chloride  250 mL Intravenous Once    [Held by provider] enoxaparin  1 mg/kg Subcutaneous Q12H    hydrocortisone sodium succinate PF  100 mg Intravenous Q8H     Continuous Infusions:   sodium chloride 100 mL/hr at 05/29/21 0129    sodium chloride      sodium chloride      dextrose       PRN Meds:   sodium chloride, , PRN  melatonin, 3 mg, Nightly PRN  hydrOXYzine, 25 mg, TID PRN  fentanNYL, 25 mcg, Q2H PRN  LORazepam, 0.5 mg, Q6H PRN  sodium chloride flush, 5-40 mL, PRN  sodium chloride, 25 mL, PRN  acetaminophen, 650 mg, Q6H PRN   Or  acetaminophen, 650 mg, Q6H PRN  glucose, 15 g, PRN  dextrose, 12.5 g, PRN  glucagon (rDNA), 1 mg, PRN  dextrose, 100 mL/hr, PRN  perflutren lipid microspheres, 1.5 mL, ONCE PRN    Labs:    Complete Blood Count:   Recent Labs     05/27/21  0613 05/27/21  1902 05/28/21  0607 05/29/21  0637   WBC 18.3*  --  17.3* 11.0   HGB 6.4* 7.9* 8.0* 7.8*   HCT 20.2* 24.4* 25.9* 25.9*     --  160 149        Last 3 Blood Glucose:   Recent Labs     05/28/21  0607 05/28/21  1700 05/29/21  0637   GLUCOSE 162* 122* 129*        PT/INR:  No results found for: PROTIME, INR  PTT:  No results found for: APTT, PTT    Comprehensive Metabolic Profile:   Recent Labs     05/28/21  0607 05/28/21  1700 05/29/21  0637    136 138   K 3.5 3.6 3.5   CL 99 100 104   CO2 26 26 27   BUN 16 15 14   CREATININE 0.9 0.9 0.9   GLUCOSE 162* 122* 129*   CALCIUM 7.4* 7.5* 7.7*   PROT 5.5*  --  5.5*   LABALBU 2.4*  --  2.7*   BILITOT <0.2  --  <0.2   ALKPHOS 61  --  61   AST 8  --  7   ALT 7  --  6      Magnesium:   Lab Results   Component Value Date    MG 2.0 05/29/2021     Phosphorus:   Lab Results   Component Value Date    PHOS 2.4 05/28/2021     Ionized Calcium: No results found for: CAION     Troponin: No results for input(s): TROPONINI in the last 72 hours. ABG: No results for input(s): PH, PCO2, PO2, HCO3, BE, O2SAT in the last 72 hours. Radiology/Imaging:   Echo Complete    Result Date: 5/26/2021  Transthoracic Echocardiography Report (TTE)  Demographics   [facility]        Gender            Female                  IRINA   Medical Record  [id number]     Room Number       0207  Number   Account #       [de-identified]    Procedure Date    05/26/2021   Corporate ID                 Ordering                               Physician   Accession       [id number]   Referring         Kimberly Miranda MD  Number                       Physician         Aaron Johnson MD   Date of Birth   1945   Sonographer       Nicole NEW   Age             66 year(s)   Interpreting      [address] [place] [address]                               Physician         Physician Cardiology                                                 Abel Kelsey MD                                Any Other  Procedure Type of Study   TTE procedure:Echo Complete W/Doppler & Color Flow. Procedure Date Date: 05/26/2021 Start: 07:40 AM Study Location: Portable Technical Quality: Poor visualization due to patient immobility. Indications:Pulmonary embolus. Patient Status: Routine Contrast Medium: Definity.  Height: 60 inches Weight: 164 pounds BSA: 1.72 m^2 BMI: 32.03 kg/m^2 HR: 90 bpm BP: 100/42 mmHg  Findings   Left Ventricle  Left ventricular internal dimensions were normal in diastole and systole. Borderline concentric left ventricular hypertrophy. Micro-bubble contrast injected to enhance left ventricular visualization. No regional wall motion abnormalities seen. Normal left ventricular ejection fraction. Ejection fraction is visually estimated at 55-60%. Indeterminate diastolic function. Right Ventricle  Normal right ventricular size and function. Left Atrium  Normal sized left atrium. Interatrial septum appears intact. Right Atrium  Normal right atrium size. Mitral Valve  Structurally normal mitral valve. Mild mitral annular calcification. Tricuspid Valve  The tricuspid valve appears structurally normal.   Aortic Valve  The aortic valve appears mildly sclerotic. Pulmonic Valve  Pulmonic valve is structurally normal.   Pericardial Effusion  No evidence of pericardial effusion. Aorta  Aortic root dimension within normal limits. Conclusions   Summary  Left ventricular internal dimensions were normal in diastole and systole. Borderline concentric left ventricular hypertrophy. No regional wall motion abnormalities seen. Normal left ventricular ejection fraction. Mild mitral annular calcification. The aortic valve appears mildly sclerotic.    Signature   ----------------------------------------------------------------  Electronically signed by Lang Sosa MD(Interpreting  physician) on 05/26/2021 07:18 PM  ----------------------------------------------------------------  M-Mode/2D Measurements & Calculations   LV Diastolic    LV Systolic Dimension: 3 cm  AV Cusp Separation: 1.8 cmLA  Dimension: 4.2  LV Volume Diastolic: 35.6 ml Dimension: 3.3 cmAO Root  cm              LV Volume Systolic: 18.3 ml  Dimension: 2.5 cm  LV FS:28.6 %    LV EDV/LV EDV Index: 77.7  LV PW           ml/45 ml/m^2LV ESV/LV ESV  Diastolic: 1.1  Index: 04.5 ml/20ml/ m^2  cm              EF Calculated: 56.6 %        RV Diastolic Dimension: 2.6  LV PW Systolic: LV Mass Index: 91 l/min*m^2  cm 1.3 cm  Septum                                       LA/Aorta: 1.89  Diastolic: 1.1  LVOT: 2 cm                   Ascending Aorta: 2.7 cm  cm                                           LA volume/Index: 37 ml  Septum                                       /21DQ/K^8  Systolic: 1.3                                RA Area: 13.2 cm^2  cm  CO: 6.44 l/min                               IVC Expiration: 1.3 cm  CI: 3.74  l/m*m^2  LV Mass: 157.06  g  Doppler Measurements & Calculations   MV Peak E-Wave: 1.04 AV Peak Velocity:     LVOT Peak Velocity: 1.15 m/s  m/s                  1.49 m/s              LVOT Mean Velocity: 0.82 m/s  MV Peak A-Wave: 1.26 AV Peak Gradient:     LVOT Peak Gradient: 5.3  m/s                  8.92 mmHg             mmHgLVOT Mean Gradient: 3 mmHg  MV E/A Ratio: 0.82   AV Mean Velocity:  MV Peak Gradient:    1.03 m/s  6.2 mmHg             AV Mean Gradient: 4.7  MV Mean Gradient:    mmHg                  TR Velocity:2.43 m/s  2.9 mmHg             AV VTI: 24.7 cm       TR Gradient:23.7 mmHg  MV Mean Velocity:    AV Area  0.81 m/s             (Continuity):2.9 cm^2  MV Deceleration  Time: 159.1 msec     LVOT VTI: 22.8 cm  MV P1/2t: 66.7 msec  IVRT: 69.2 msec  MVA by PHT:3.3 cm^2  MV Area  (continuity): 2.6  cm^2  MV E' Septal  Velocity: 0.05 m/s  MV E' Lateral  Velocity: 8 m/s  [email or web address] [id number]    CT ABDOMEN PELVIS WO CONTRAST Additional Contrast? None    Result Date: 5/25/2021  EXAMINATION: CT OF THE ABDOMEN AND PELVIS WITHOUT CONTRAST 5/25/2021 8:26 pm TECHNIQUE: CT of the abdomen and pelvis was performed without the administration of intravenous contrast. Multiplanar reformatted images are provided for review. Dose modulation, iterative reconstruction, and/or weight based adjustment of the mA/kV was utilized to reduce the radiation dose to as low as reasonably achievable. COMPARISON: None. HISTORY: ORDERING SYSTEM PROVIDED HISTORY: sepsis TECHNOLOGIST PROVIDED HISTORY: Reason for exam:->sepsis Additional Contrast?->None FINDINGS: Lower Chest: Atelectatic changes and pleuroparenchymal scarring in the lung bases, likely chronic. Images significantly degraded by respiratory motion artifact. Organs: Diffuse fatty infiltration of the liver. Large gallstones in the dependent portion of the gallbladder. The spleen, pancreas are within normal limits. Mostly hypodense and homogeneous right adrenal nodule measuring approximately 2.8 cm in diameter, probable adenoma. Further evaluation with adrenal washout CT or chemical shift MRI recommended. There is contrast in the renal collecting systems from prior CTA of the chest.  No evidence of obstructive uropathy. Incidental l renal cysts. Bilateral perinephric stranding, which may be chronic. GI/Bowel: No evidence of bowel obstruction or free intraperitoneal air. No colitis or diverticulitis. No signs of appendicitis. Pelvis: There is prominent artifact in the pelvis secondary to bilateral hip prosthesis. Blair catheter in the decompressed urinary bladder. Peritoneum/Retroperitoneum: Atherosclerotic calcification along the abdominal aorta with no evidence of aneurysmal dilatation. Bones/Soft Tissues: No acute bony pathology. Large gallstones in the dependent portion of the gallbladder. Mostly hypodense and homogeneous right adrenal nodule measuring 2.8 cm in diameter, probable adenoma. Further evaluation with adrenal washout CT or chemical shift MRI recommended. Other chronic or incidental findings as noted.      CT HEAD WO CONTRAST    Result Date: 5/25/2021  EXAMINATION: CT OF THE HEAD WITHOUT CONTRAST  5/25/2021 4:32 pm TECHNIQUE: CT of the head was performed without the administration of intravenous contrast. Dose modulation, iterative reconstruction, and/or weight based adjustment of the mA/kV was utilized to reduce the radiation dose to as low as reasonably achievable. COMPARISON: None. HISTORY: ORDERING SYSTEM PROVIDED HISTORY: AMS TECHNOLOGIST PROVIDED HISTORY: Has a \"code stroke\" or \"stroke alert\" been called? ->No Reason for exam:->AMS Decision Support Exception - unselect if not a suspected or confirmed emergency medical condition->Emergency Medical Condition (MA) FINDINGS: BRAIN/VENTRICLES: There is no acute intracranial hemorrhage, mass effect or midline shift. No abnormal extra-axial fluid collection. The gray-white differentiation is maintained without evidence of an acute infarct. There is no evidence of hydrocephalus. Periventricular white matter changes consistent chronic microvascular disease. Diffuse volume loss. ORBITS: The visualized portion of the orbits demonstrate no acute abnormality. SINUSES: The visualized paranasal sinuses and mastoid air cells demonstrate no acute abnormality. SOFT TISSUES/SKULL:  No acute abnormality of the visualized skull or soft tissues. No acute intracranial abnormality. Periventricular white matter changes consistent chronic microvascular disease. Diffuse volume loss. XR CHEST PORTABLE    Result Date: 5/25/2021  EXAMINATION: ONE XRAY VIEW OF THE CHEST 5/25/2021 3:18 pm COMPARISON: None. HISTORY: ORDERING SYSTEM PROVIDED HISTORY: sepsis TECHNOLOGIST PROVIDED HISTORY: Reason for exam:->sepsis FINDINGS: Interstitial opacities are present in perihilar locations. No evidence of pleural effusion. The heart is normal size. No pneumothorax. Interstitial opacities in perihilar locations could suggest subsegmental atelectasis or bronchopneumonia. Short-term follow-up may be helpful for further evaluation. CTA CHEST W CONTRAST    Result Date: 5/25/2021  EXAMINATION: CTA OF THE CHEST 5/25/2021 4:29 pm TECHNIQUE: CTA of the chest was performed after the administration of intravenous contrast.  Multiplanar reformatted images are provided for review. MIP images are provided for review.  Dose modulation, iterative reconstruction, and/or weight based adjustment of the mA/kV was utilized to reduce the radiation dose to as low as reasonably achievable. COMPARISON: None. HISTORY: ORDERING SYSTEM PROVIDED HISTORY: Possible PE TECHNOLOGIST PROVIDED HISTORY: Reason for exam:->Possible PE Decision Support Exception - unselect if not a suspected or confirmed emergency medical condition->Emergency Medical Condition (MA) FINDINGS: There are motion artifacts particular for the lower lobes causing misregistration of the images and loss of detail. There are a group of segment/subsegmental branches of the right lower lobe pulmonary artery towards the posterior segmental region which are not filling properly with contrast more likely represent a form of acute/subacute pulmonary embolus. These are seen on imaging A2/109-115 A3/85-87, A4/92. There is also some incomplete filling of more distal subsegmental branches of the left lower lobe. No acute pulmonary embolus seen in the more central pulmonary arteries. The diameter for the main pulmonary artery is 3.3 cm and for the left main pulmonary artery is 2.6 cm and for the right main pulmonary artery is 2 cm. There is no aneurysm formation or dissection of the thoracic aorta. Heart is normal size. The some calcifications are seen in the coronary arteries. There is no pericardial effusion. No mediastinal mass or adenopathy seen. Lymph nodes in the mediastinum are borderline in size and number. There are areas of a peripheral subsegmental atelectasis in both the upper lobes. The there is also some additional areas of atelectasis in the lower lobes. The motion artifacts again causes misregistration of the images and loss of detail in the lung parenchyma. Upper abdominal structures demonstrates some a hydronephrotic like changes for the right kidney not fully covered on this study. The can follow with a KUB which will function like an late IVP imaging for the right kidney.  The is a large size gallstone in the upper body of the gallbladder measures 15 mm. There is a hyperdense cyst likely in the upper pole of the left kidney to be further evaluated with ultrasound measuring 18 mm. There is a nodular appearance for the left adrenal gland with relative low density which could represent a nonfunctioning adenoma but proper evaluation will required a adrenal protocol study on routine basis with the CT or MRI. 1.  There are limitation on the present study due motion artifacts caused misregistration of the images as above discussed. 2.  However there appears to be signs for acute/subacute pulmonary embolus in the lower lobe pulmonary arteries more noticeable on the right lower lobe as above commented. Preliminary report given to Dr. Maira Grimaldo, ER physician borderline. US DUP LOWER EXTREMITIES BILATERAL VENOUS    Result Date: 5/25/2021  EXAMINATION: DUPLEX VENOUS ULTRASOUND OF THE BILATERAL LOWER EXTREMITIES, 5/25/2021 11:36 pm TECHNIQUE: Duplex ultrasound using B-mode/gray scaled imaging and Doppler spectral analysis and color flow was obtained of the bilateral lower extremities. COMPARISON: None. HISTORY: ORDERING SYSTEM PROVIDED HISTORY: dvt TECHNOLOGIST PROVIDED HISTORY: Reason for exam:->dvt What reading provider will be dictating this exam?->CRC FINDINGS: Limitations: Unable to visualize the right common femoral vein/greater saphenous vein due to the presence of an IV catheter. Limited evaluation of the left calf veins. The visualized veins of the bilateral lower extremities are patent and free of echogenic thrombus. The veins demonstrate good compressibility with normal color flow study and spectral analysis. No evidence of DVT in either lower extremity given the limitations described. Chest Xray (5/29/2021): Pulmonary vascular congestive changes.     ASSESSMENT & PLAN:     NEURO:    Neuro Intact  -Continue to monitor      RESPIRATORY:    Acute Respiratory failure with hypoxia  NC 4 L   -Brovana 15 mcg BID  -Pulmicort 500 mcg BID  -Duonebs Q4HR  -Wean oxygen as tolerated. Keep O2 sat 90-92%     Bilateral PE  DVTs absent in lower extremities, PE still small, likely an artifact, can restart anticoagulation in 2 weeks if needed  -Lovenox 1mg/kg held    CARDIOVASCULAR:    Shock, Sepsis  NICOM non-responsive, LVEF 55-60%. Off Levo  - ml/hr   -Solu-Cortef 100 mg Q8HR (Day 4)  -Continue to wean as tolerated     GI:    GI Prophylaxis   -Protonix 40 mg BID    Heme occult positive   EGD showed gastritis, GERD, duodenitis,small non bleeding AVM  -GI Consulted, appreciate recs  -Plan for colonoscopy post ICU transfer     RENAL:    GALEN  Resolved  -Continue to monitor  - ml/hr     Hypophosphatemia  -Continue to monitor    Hypomagnesemia  Resolved  -Replace as needed  -Continue to monitor    INFECTIOUS DISEASE:    Sepsis  WBC 11, Lactate 1.1, Blood cultures positive for Klebsiella pneumoniae, gram negative rods, received cefepime, vancomycin and ertapenem  -Ceftriaxone 2000 mg Q24HR (Day 2)  -ID following     HEME/ONC:    Anemia  Hb 7.8, received 1 unit of PRBC on 05/27  -H&H daily  -Lovenox 1mg/kg held  -Continue to monitor and replace if <7     ENDOCRINE:     T2DM    -MDSSI  -Hypoglycemia protocol    SOCIAL:    -Limited code  -Palliative Care consulted  -Fentanyl for chronic pain  -Ativan for anxiety      Rai Anthony MD, PGY-1                5/29/2021, 8:20 AM      I personally saw, examined and provided care for the patient. Radiographs, labs and medication list were reviewed by me independently. I spoke with bedside nursing, therapists and consultants. Critical care services and times documented are independent of procedures and multidisciplinary rounds with Residents. Additionally comprehensive, multidisciplinary rounds were conducted with the MICU team. The case was discussed in detail and plans for care were established.  Review of Residents documentation was conducted and revisions were made as appropriate. I agree with the above documented exam, problem list and plan of care. Rey Myers M.D.    Pulmonary/Critical Care Medicine   30 min cct excluding procedures

## 2021-05-29 NOTE — PLAN OF CARE
Problem: Skin Integrity:  Goal: Will show no infection signs and symptoms  Description: Will show no infection signs and symptoms  5/29/2021 1006 by Ronney Primrose, RN  Outcome: Met This Shift  5/29/2021 0132 by Arcelia George RN  Outcome: Met This Shift  Goal: Absence of new skin breakdown  Description: Absence of new skin breakdown  5/29/2021 1006 by Ronney Primrose, RN  Outcome: Met This Shift  5/29/2021 0132 by Arcelia George RN  Outcome: Met This Shift     Problem: Falls - Risk of:  Goal: Will remain free from falls  Description: Will remain free from falls  5/29/2021 1006 by Ronney Primrose, RN  Outcome: Met This Shift  5/29/2021 0132 by Arcelia George RN  Outcome: Met This Shift  Goal: Absence of physical injury  Description: Absence of physical injury  5/29/2021 1006 by Ronney Primrose, RN  Outcome: Met This Shift  5/29/2021 0132 by Arcelia George RN  Outcome: Met This Shift     Problem: ABCDS Injury Assessment  Goal: Absence of physical injury  5/29/2021 1006 by Ronney Primrose, RN  Outcome: Met This Shift  5/29/2021 0132 by Arcelia George RN  Outcome: Met This Shift     Problem: Cardiac Output - Decreased:  Goal: Hemodynamic stability will improve  Description: Hemodynamic stability will improve  5/29/2021 1006 by Ronney Primrose, RN  Outcome: Met This Shift  5/29/2021 0132 by Arcelia George RN  Outcome: Met This Shift     Problem: Gas Exchange - Impaired:  Goal: Levels of oxygenation will improve  Description: Levels of oxygenation will improve  5/29/2021 1006 by Ronney Primrose, RN  Outcome: Ongoing  5/29/2021 0132 by Arcelia George RN  Outcome: Met This Shift     Problem: Anxiety:  Goal: Level of anxiety will decrease  Description: Level of anxiety will decrease  5/29/2021 1006 by Ronney Primrose, RN  Outcome: Ongoing  5/29/2021 0132 by Arcelia George RN  Outcome: Met This Shift     Problem: Pain:  Goal: Pain level will decrease  Description: Pain level will decrease  5/29/2021 1006 by Ronney Primrose, RN  Outcome: Ongoing  5/29/2021 0132 by Anant Infante RN  Outcome: Met This Shift  Goal: Control of acute pain  Description: Control of acute pain  5/29/2021 1006 by Chanel Childers RN  Outcome: Ongoing  5/29/2021 0132 by Anant Infante RN  Outcome: Met This Shift  Goal: Control of chronic pain  Description: Control of chronic pain  5/29/2021 1006 by Chanel Childers RN  Outcome: Ongoing  5/29/2021 0132 by Anant Infante RN  Outcome: Met This Shift

## 2021-05-29 NOTE — PROGRESS NOTES
person, place and time and in no acute distress  Skin: warm and dry  Head: normocephalic and atraumatic  Eyes: pupils equal, round, and reactive to light, extraocular eye movements intact, conjunctivae normal  Neck: neck supple and non tender without mass   Pulmonary/Chest: clear to auscultation bilaterally- no wheezes, rales or rhonchi, normal air movement, no respiratory distress  Cardiovascular: normal rate, normal S1 and S2 and no carotid bruits  Abdomen: soft, non-tender, non-distended, normal bowel sounds, no masses or organomegaly  Extremities: no cyanosis, no clubbing and no edema  Neurologic: no cranial nerve deficit and speech normal        Recent Labs     05/28/21  0607 05/28/21  1700 05/29/21  0637    136 138   K 3.5 3.6 3.5   CL 99 100 104   CO2 26 26 27   BUN 16 15 14   CREATININE 0.9 0.9 0.9   GLUCOSE 162* 122* 129*   CALCIUM 7.4* 7.5* 7.7*       Recent Labs     05/27/21  0613 05/27/21  1902 05/28/21  0607 05/29/21  0637   WBC 18.3*  --  17.3* 11.0   RBC 2.77*  --  3.36* 3.27*   HGB 6.4* 7.9* 8.0* 7.8*   HCT 20.2* 24.4* 25.9* 25.9*   MCV 72.9*  --  77.1* 79.2*   MCH 23.1*  --  23.8* 23.9*   MCHC 31.7*  --  30.9* 30.1*   RDW 17.2*  --  17.4* 17.4*     --  160 149   MPV 10.9  --  NOT CALC NOT CALC       Radiology:   EXAMINATION:   DUPLEX VENOUS ULTRASOUND OF THE BILATERAL LOWER EXTREMITIES, 5/25/2021 11:36   pm       TECHNIQUE:   Duplex ultrasound using B-mode/gray scaled imaging and Doppler spectral   analysis and color flow was obtained of the bilateral lower extremities.       COMPARISON:   None.       HISTORY:   ORDERING SYSTEM PROVIDED HISTORY: dvt   TECHNOLOGIST PROVIDED HISTORY:   Reason for exam:->dvt   What reading provider will be dictating this exam?->CRC       FINDINGS:   Limitations: Unable to visualize the right common femoral vein/greater   saphenous vein due to the presence of an IV catheter.  Limited evaluation of   the left calf veins.       The visualized veins of the bilateral lower extremities are patent and free   of echogenic thrombus. The veins demonstrate good compressibility with normal   color flow study and spectral analysis.         Impression   No evidence of DVT in either lower extremity given the limitations described.             EXAMINATION:   CT OF THE ABDOMEN AND PELVIS WITHOUT CONTRAST 5/25/2021 8:26 pm       TECHNIQUE:   CT of the abdomen and pelvis was performed without the administration of   intravenous contrast. Multiplanar reformatted images are provided for review. Dose modulation, iterative reconstruction, and/or weight based adjustment of   the mA/kV was utilized to reduce the radiation dose to as low as reasonably   achievable.       COMPARISON:   None.       HISTORY:   ORDERING SYSTEM PROVIDED HISTORY: sepsis   TECHNOLOGIST PROVIDED HISTORY:   Reason for exam:->sepsis   Additional Contrast?->None       FINDINGS:   Lower Chest: Atelectatic changes and pleuroparenchymal scarring in the lung   bases, likely chronic.       Images significantly degraded by respiratory motion artifact.       Organs: Diffuse fatty infiltration of the liver.       Large gallstones in the dependent portion of the gallbladder.       The spleen, pancreas are within normal limits.       Mostly hypodense and homogeneous right adrenal nodule measuring approximately   2.8 cm in diameter, probable adenoma.  Further evaluation with adrenal   washout CT or chemical shift MRI recommended.       There is contrast in the renal collecting systems from prior CTA of the   chest.  No evidence of obstructive uropathy.  Incidental l renal cysts. Bilateral perinephric stranding, which may be chronic.       GI/Bowel: No evidence of bowel obstruction or free intraperitoneal air.       No colitis or diverticulitis.       No signs of appendicitis.       Pelvis:  There is prominent artifact in the pelvis secondary to bilateral hip   prosthesis.       Blair catheter in the decompressed urinary bladder.       Peritoneum/Retroperitoneum: Atherosclerotic calcification along the abdominal   aorta with no evidence of aneurysmal dilatation.       Bones/Soft Tissues: No acute bony pathology.           Impression   Large gallstones in the dependent portion of the gallbladder.       Mostly hypodense and homogeneous right adrenal nodule measuring 2.8 cm in   diameter, probable adenoma.  Further evaluation with adrenal washout CT or   chemical shift MRI recommended.       Other chronic or incidental findings as noted.                 Assessment:    Active Problems:    Septic shock ([address]  Resolved Problems:    * No resolved hospital problems. *      Plan:  1. Sepsis due to complicated UTI  -slowly improving  -enterobacteriaceae and klebsiella on blood cultures  -thus far has received cefepime, ertapenem, vancomycin  -levophed for BP support; weaned per ICU attending  -continue solucortef  -has art and central lines  -WBC is improving  -ID    2. GALEN  -improved  -likely related to sepsis and hypotension  -continue IVF  -continue to monitor GFR and creatinine  -Bicarb improved, bicarb drip discontinued  -Patient remains n.p.o.;  Started on D5 half-normal saline at 100 cc/h; now on NS and eating    3.  PE, bialteral  -needed 5L of supplemental O2 via NC on admission, now on 4 L  -anticoagulation with full dose lovenox now on hold    4. Anemia  -hemoccult positive  -currently on GI prophylaxis with protonix  -GI consulted  -EGD performed 5/28/2021 revealing gastritis and duodenitis. Biopsies obtained. No active bleeding.  -continue to monitor hemglobin, transfuse for hgb <7  -1 unit PRBCs was transfused 5/27/21 for Hgb 6.4    5. DM2  -continue basal/bolus/ISS    6. Essential hypertension  -with hypotension due to sepsis  -hold any home BP medications at this time    7. Dementia  -resume home medications    8. Hypokalemia  -replete and monitor    9.   Leukocytosis  -secondary to UTI/sepsis  -trending downward today; 28.6 down to 11.0  -continue to treat infection    NOTE: This report was transcribed using voice recognition software. Every effort was made to ensure accuracy; however, inadvertent computerized transcription errors may be present.   Electronically signed by Meme Tyler MD on 5/29/2021 at 2:09 PM

## 2021-05-29 NOTE — FLOWSHEET NOTE
Patient transferring from ICU room 207 to 426, report called to Laura, placed on telepack, patient belongings consisting of transported with patient. Grand daughter Jaleesa Burt called and notified. Nurses' station number given to her.

## 2021-05-29 NOTE — PATIENT CARE CONFERENCE
Intensive Care Daily Quality Rounding Checklist        ICU Team Members: bedside  Nurse, charge nurse, RT, clinical pharmacist, Warner Runner, residents     ICU Day #: NUMBER: 5     Intubation Date:  N/A     Ventilator Day #: N/A     Central Line Insertion Date: May 25,2021                                                    Day #: NUMBER: 5      Arterial Line Insertion Date: N/A                             Day #: N/A     Temporary Hemodialysis Catheter Insertion Date:  N/A                             Day # N/A     DVT Prophylaxis: Lovenox (on hold)    GI Prophylaxis: Protonix     Blair Catheter Insertion Date: May 25,2021                                        Day #: 5                             Continued need (if yes, reason documented and discussed with physician): yes, strict I&O in critical patient     Skin Issues/ Wounds and ordered treatment discussed on rounds: no issues SOS precautions     Goals/ Plans for the Day: monitor labs and vitals, replace electrolytes, transfer out of ICU to telemetry today,

## 2021-05-29 NOTE — PROGRESS NOTES
[address] [place] [address] [facility]  [facility]  Progress Note    SUBJECTIVE:  Chief Complaint   Patient presents with    Blood Infection     sent in for sepsis rule out     The patient is feeling better today. She is no longer moaning and groaning. Denies any pain. No nausea or vomiting. Tolerating antibiotics. Review of systems:  As stated above in the chief complaint, otherwise negative. Medications:  Scheduled Meds:   cefTRIAXone (ROCEPHIN) IV  2,000 mg Intravenous Q24H    mupirocin   Topical BID    pantoprazole  40 mg Intravenous BID    And    sodium chloride (PF)  10 mL Intravenous BID    Arformoterol Tartrate  15 mcg Nebulization BID    budesonide  0.5 mg Nebulization BID    ipratropium-albuterol  1 ampule Inhalation Q4H WA    insulin lispro  0-12 Units Subcutaneous TID WC    insulin lispro  0-6 Units Subcutaneous Nightly    sodium chloride  250 mL Intravenous Once    [Held by provider] enoxaparin  1 mg/kg Subcutaneous Q12H    hydrocortisone sodium succinate PF  100 mg Intravenous Q8H     Continuous Infusions:   sodium chloride 100 mL/hr at 21 0129    sodium chloride      sodium chloride      dextrose       PRN Meds:sodium chloride, melatonin, hydrOXYzine, fentanNYL, LORazepam, sodium chloride flush, sodium chloride, acetaminophen **OR** acetaminophen, glucose, dextrose, glucagon (rDNA), dextrose, perflutren lipid microspheres    OBJECTIVE:  BP (!) 103/49   Pulse 75   Temp 97.9 °F (36.6 °C) (Bladder)   Resp 16   Ht 5' (1.524 m)   Wt 180 lb 1.9 oz (81.7 kg)   SpO2 97%   BMI 35.18 kg/m²   Temp  Av.3 °F (36.8 °C)  Min: 97.9 °F (36.6 °C)  Max: 98.7 °F (37.1 °C)  Constitutional: The patient is lying in bed in the ICU. She is awake and alert. Visitor present. Skin: Warm and dry. No rashes were noted. HEENT: Round and reactive pupils. Moist mucous membranes. No ulcerations or thrush. Neck: Supple to movements. Chest: No respiratory distress.   No crackles. Cardiovascular: Heart sounds rhythmic and regular. Abdomen: Positive bowel sounds to auscultation. Benign to palpation. Extremities: No edema. Bilateral TKR. Lines: Right femoral TLC 5/25/2021. Blair catheter.     Laboratory and Tests Review:  Lab Results   Component Value Date    WBC 11.0 05/29/2021    WBC 17.3 (H) 05/28/2021    WBC 18.3 (H) 05/27/2021    HGB 7.8 (L) 05/29/2021    HCT 25.9 (L) 05/29/2021    MCV 79.2 (L) 05/29/2021     05/29/2021     Lab Results   Component Value Date    NEUTROABS 10.45 (H) 05/29/2021    NEUTROABS 15.65 (H) 05/28/2021    NEUTROABS 16.65 (H) 05/27/2021     No results found for: CRP  Lab Results   Component Value Date    ALT 6 05/29/2021    AST 7 05/29/2021    ALKPHOS 61 05/29/2021    BILITOT <0.2 05/29/2021     Lab Results   Component Value Date     05/29/2021    K 3.5 05/29/2021     05/29/2021    CO2 27 05/29/2021    BUN 14 05/29/2021    CREATININE 0.9 05/29/2021    CREATININE 0.9 05/28/2021    CREATININE 0.9 05/28/2021    GFRAA >60 05/29/2021    LABGLOM >60 05/29/2021    GLUCOSE 129 05/29/2021    PROT 5.5 05/29/2021    LABALBU 2.7 05/29/2021    CALCIUM 7.7 05/29/2021    BILITOT <0.2 05/29/2021    ALKPHOS 61 05/29/2021    AST 7 05/29/2021    ALT 6 05/29/2021     Lab Results   Component Value Date    CRP 22.1 (H) 05/27/2021     No results found for: [address]  Radiology:      Microbiology:   Blood cultures 5/25/2021: Ampicillin resistant Klebsiella pneumoniae in 4 of 4 bottles  Blood cultures 5/27/2021: Negative so far  Nares screen MRSA: Positive  Urine culture ordered but never sent    ASSESSMENT:  · Complicated UTI with sepsis secondary to Klebsiella  · Klebsiella septicemia associated to complicated UTI  · Septic shock, resolved  · Leukocytosis and fever secondary to sepsis and steroids, improved    PLAN:  · Continue Ceftriaxone  · Check repeat blood culture  · Transfer out of the ICU  · We will follow with you    Nela Rivera MD  9:21 AM  5/29/2021

## 2021-05-29 NOTE — ANESTHESIA POSTPROCEDURE EVALUATION
Department of Anesthesiology  Postprocedure Note    Patient: Umair José  MRN: [id number]  YOB: 1945  Date of evaluation: 5/28/2021  Time:  8:33 PM     Procedure Summary     Date: 05/28/21 Room / Location: [address] [place] [address]    Anesthesia Start: 0696 Anesthesia Stop: 7438    Procedure: EGD BIOPSY (N/A ) Diagnosis: (/)    Surgeons: Charmayne Brunner, MD Responsible Provider: Jaycee Sheriff MD    Anesthesia Type: MAC ASA Status: 4          Anesthesia Type: MAC    Monica Phase I:      Monica Phase II:      Last vitals: Reviewed and per EMR flowsheets.        Anesthesia Post Evaluation    Patient location during evaluation: PACU  Patient participation: complete - patient participated  Level of consciousness: awake and alert  Airway patency: patent  Nausea & Vomiting: no nausea and no vomiting  Complications: no  Cardiovascular status: blood pressure returned to baseline  Respiratory status: acceptable  Hydration status: stable

## 2021-05-30 LAB
ALBUMIN SERPL-MCNC: 2.8 G/DL (ref 3.5–5.2)
ALP BLD-CCNC: 86 U/L (ref 35–104)
ALT SERPL-CCNC: 8 U/L (ref 0–32)
ANION GAP SERPL CALCULATED.3IONS-SCNC: 10 MMOL/L (ref 7–16)
ANISOCYTOSIS: ABNORMAL
AST SERPL-CCNC: 10 U/L (ref 0–31)
BASOPHILS ABSOLUTE: 0.01 E9/L (ref 0–0.2)
BASOPHILS RELATIVE PERCENT: 0.1 % (ref 0–2)
BILIRUB SERPL-MCNC: 0.2 MG/DL (ref 0–1.2)
BUN BLDV-MCNC: 14 MG/DL (ref 6–23)
CALCIUM SERPL-MCNC: 8.1 MG/DL (ref 8.6–10.2)
CHLORIDE BLD-SCNC: 107 MMOL/L (ref 98–107)
CO2: 24 MMOL/L (ref 22–29)
CREAT SERPL-MCNC: 0.8 MG/DL (ref 0.5–1)
EOSINOPHILS ABSOLUTE: 0 E9/L (ref 0.05–0.5)
EOSINOPHILS RELATIVE PERCENT: 0 % (ref 0–6)
GFR AFRICAN AMERICAN: >60
GFR NON-AFRICAN AMERICAN: >60 ML/MIN/1.73
GLUCOSE BLD-MCNC: 144 MG/DL (ref 74–99)
HCT VFR BLD CALC: 26.2 % (ref 34–48)
HEMOGLOBIN: 7.8 G/DL (ref 11.5–15.5)
HYPOCHROMIA: ABNORMAL
IMMATURE GRANULOCYTES #: 0.5 E9/L
IMMATURE GRANULOCYTES %: 5 % (ref 0–5)
LYMPHOCYTES ABSOLUTE: 0.86 E9/L (ref 1.5–4)
LYMPHOCYTES RELATIVE PERCENT: 8.7 % (ref 20–42)
MCH RBC QN AUTO: 23.5 PG (ref 26–35)
MCHC RBC AUTO-ENTMCNC: 29.8 % (ref 32–34.5)
MCV RBC AUTO: 78.9 FL (ref 80–99.9)
METER GLUCOSE: 137 MG/DL (ref 74–99)
METER GLUCOSE: 147 MG/DL (ref 74–99)
METER GLUCOSE: 158 MG/DL (ref 74–99)
METER GLUCOSE: 190 MG/DL (ref 74–99)
MONOCYTES ABSOLUTE: 0.54 E9/L (ref 0.1–0.95)
MONOCYTES RELATIVE PERCENT: 5.4 % (ref 2–12)
NEUTROPHILS ABSOLUTE: 8.01 E9/L (ref 1.8–7.3)
NEUTROPHILS RELATIVE PERCENT: 80.8 % (ref 43–80)
OVALOCYTES: ABNORMAL
PDW BLD-RTO: 17.5 FL (ref 11.5–15)
PLATELET # BLD: 213 E9/L (ref 130–450)
PMV BLD AUTO: 11.7 FL (ref 7–12)
POIKILOCYTES: ABNORMAL
POLYCHROMASIA: ABNORMAL
POTASSIUM REFLEX MAGNESIUM: 3.6 MMOL/L (ref 3.5–5)
RBC # BLD: 3.32 E12/L (ref 3.5–5.5)
SCHISTOCYTES: ABNORMAL
SODIUM BLD-SCNC: 141 MMOL/L (ref 132–146)
TEAR DROP CELLS: ABNORMAL
TOTAL PROTEIN: 5.8 G/DL (ref 6.4–8.3)
WBC # BLD: 9.9 E9/L (ref 4.5–11.5)

## 2021-05-30 PROCEDURE — 6370000000 HC RX 637 (ALT 250 FOR IP): Performed by: SPECIALIST

## 2021-05-30 PROCEDURE — 6370000000 HC RX 637 (ALT 250 FOR IP): Performed by: INTERNAL MEDICINE

## 2021-05-30 PROCEDURE — 82962 GLUCOSE BLOOD TEST: CPT

## 2021-05-30 PROCEDURE — 36415 COLL VENOUS BLD VENIPUNCTURE: CPT

## 2021-05-30 PROCEDURE — 6360000002 HC RX W HCPCS: Performed by: INTERNAL MEDICINE

## 2021-05-30 PROCEDURE — 85025 COMPLETE CBC W/AUTO DIFF WBC: CPT

## 2021-05-30 PROCEDURE — 80053 COMPREHEN METABOLIC PANEL: CPT

## 2021-05-30 PROCEDURE — 2580000003 HC RX 258: Performed by: INTERNAL MEDICINE

## 2021-05-30 PROCEDURE — 94640 AIRWAY INHALATION TREATMENT: CPT

## 2021-05-30 PROCEDURE — C9113 INJ PANTOPRAZOLE SODIUM, VIA: HCPCS | Performed by: INTERNAL MEDICINE

## 2021-05-30 PROCEDURE — 2700000000 HC OXYGEN THERAPY PER DAY

## 2021-05-30 PROCEDURE — 99232 SBSQ HOSP IP/OBS MODERATE 35: CPT | Performed by: FAMILY MEDICINE

## 2021-05-30 PROCEDURE — 2060000000 HC ICU INTERMEDIATE R&B

## 2021-05-30 RX ORDER — CEFDINIR 300 MG/1
300 CAPSULE ORAL EVERY 12 HOURS SCHEDULED
Status: DISCONTINUED | OUTPATIENT
Start: 2021-05-30 | End: 2021-06-03 | Stop reason: HOSPADM

## 2021-05-30 RX ADMIN — FENTANYL CITRATE 25 MCG: 50 INJECTION, SOLUTION INTRAMUSCULAR; INTRAVENOUS at 23:56

## 2021-05-30 RX ADMIN — SODIUM CHLORIDE: 9 INJECTION, SOLUTION INTRAVENOUS at 07:39

## 2021-05-30 RX ADMIN — FENTANYL CITRATE 25 MCG: 50 INJECTION, SOLUTION INTRAMUSCULAR; INTRAVENOUS at 05:54

## 2021-05-30 RX ADMIN — INSULIN LISPRO 2 UNITS: 100 INJECTION, SOLUTION INTRAVENOUS; SUBCUTANEOUS at 16:26

## 2021-05-30 RX ADMIN — SODIUM CHLORIDE, PRESERVATIVE FREE 10 ML: 5 INJECTION INTRAVENOUS at 13:53

## 2021-05-30 RX ADMIN — SODIUM CHLORIDE: 9 INJECTION, SOLUTION INTRAVENOUS at 17:21

## 2021-05-30 RX ADMIN — Medication 3 MG: at 21:02

## 2021-05-30 RX ADMIN — HYDROCORTISONE SODIUM SUCCINATE 100 MG: 100 INJECTION, POWDER, FOR SOLUTION INTRAMUSCULAR; INTRAVENOUS at 21:02

## 2021-05-30 RX ADMIN — FENTANYL CITRATE 25 MCG: 50 INJECTION, SOLUTION INTRAMUSCULAR; INTRAVENOUS at 21:02

## 2021-05-30 RX ADMIN — CEFDINIR 300 MG: 300 CAPSULE ORAL at 12:10

## 2021-05-30 RX ADMIN — PANTOPRAZOLE SODIUM 40 MG: 40 INJECTION, POWDER, FOR SOLUTION INTRAVENOUS at 21:02

## 2021-05-30 RX ADMIN — MUPIROCIN: 20 OINTMENT TOPICAL at 21:09

## 2021-05-30 RX ADMIN — PANTOPRAZOLE SODIUM 40 MG: 40 INJECTION, POWDER, FOR SOLUTION INTRAVENOUS at 08:14

## 2021-05-30 RX ADMIN — SODIUM CHLORIDE, PRESERVATIVE FREE 10 ML: 5 INJECTION INTRAVENOUS at 21:02

## 2021-05-30 RX ADMIN — FENTANYL CITRATE 25 MCG: 50 INJECTION, SOLUTION INTRAMUSCULAR; INTRAVENOUS at 16:20

## 2021-05-30 RX ADMIN — CEFDINIR 300 MG: 300 CAPSULE ORAL at 21:02

## 2021-05-30 RX ADMIN — IPRATROPIUM BROMIDE AND ALBUTEROL SULFATE 1 AMPULE: 2.5; .5 SOLUTION RESPIRATORY (INHALATION) at 07:42

## 2021-05-30 RX ADMIN — HYDROCORTISONE SODIUM SUCCINATE 100 MG: 100 INJECTION, POWDER, FOR SOLUTION INTRAMUSCULAR; INTRAVENOUS at 05:08

## 2021-05-30 RX ADMIN — HYDROCORTISONE SODIUM SUCCINATE 100 MG: 100 INJECTION, POWDER, FOR SOLUTION INTRAMUSCULAR; INTRAVENOUS at 13:52

## 2021-05-30 RX ADMIN — SODIUM CHLORIDE, PRESERVATIVE FREE 10 ML: 5 INJECTION INTRAVENOUS at 08:14

## 2021-05-30 ASSESSMENT — PAIN DESCRIPTION - ORIENTATION
ORIENTATION: MID;LOWER
ORIENTATION: LOWER

## 2021-05-30 ASSESSMENT — PAIN DESCRIPTION - LOCATION
LOCATION: BACK
LOCATION: BACK

## 2021-05-30 ASSESSMENT — PAIN DESCRIPTION - DESCRIPTORS
DESCRIPTORS: ACHING
DESCRIPTORS: ACHING

## 2021-05-30 ASSESSMENT — PAIN - FUNCTIONAL ASSESSMENT
PAIN_FUNCTIONAL_ASSESSMENT: PREVENTS OR INTERFERES SOME ACTIVE ACTIVITIES AND ADLS
PAIN_FUNCTIONAL_ASSESSMENT: PREVENTS OR INTERFERES SOME ACTIVE ACTIVITIES AND ADLS

## 2021-05-30 ASSESSMENT — PAIN DESCRIPTION - PAIN TYPE
TYPE: ACUTE PAIN
TYPE: ACUTE PAIN

## 2021-05-30 ASSESSMENT — PAIN DESCRIPTION - PROGRESSION: CLINICAL_PROGRESSION: NOT CHANGED

## 2021-05-30 ASSESSMENT — PAIN SCALES - GENERAL
PAINLEVEL_OUTOF10: 4
PAINLEVEL_OUTOF10: 10
PAINLEVEL_OUTOF10: 8
PAINLEVEL_OUTOF10: 7
PAINLEVEL_OUTOF10: 8

## 2021-05-30 ASSESSMENT — PAIN DESCRIPTION - ONSET: ONSET: ON-GOING

## 2021-05-30 ASSESSMENT — PAIN DESCRIPTION - FREQUENCY: FREQUENCY: CONTINUOUS

## 2021-05-30 NOTE — PROGRESS NOTES
[address] [place] [address] [facility]  [facility]  Progress Note    SUBJECTIVE:  Chief Complaint   Patient presents with    Blood Infection     sent in for sepsis rule out     The patient is feeling fair. Appetite is poor. Tolerating antibiotics. Denies any pain. No fever. Review of systems:  As stated above in the chief complaint, otherwise negative. Medications:  Scheduled Meds:   cefTRIAXone (ROCEPHIN) IV  2,000 mg Intravenous Q24H    mupirocin   Topical BID    pantoprazole  40 mg Intravenous BID    And    sodium chloride (PF)  10 mL Intravenous BID    Arformoterol Tartrate  15 mcg Nebulization BID    budesonide  0.5 mg Nebulization BID    ipratropium-albuterol  1 ampule Inhalation Q4H WA    insulin lispro  0-12 Units Subcutaneous TID WC    insulin lispro  0-6 Units Subcutaneous Nightly    sodium chloride  250 mL Intravenous Once    [Held by provider] enoxaparin  1 mg/kg Subcutaneous Q12H    hydrocortisone sodium succinate PF  100 mg Intravenous Q8H     Continuous Infusions:   sodium chloride 100 mL/hr at 21 0739    sodium chloride      sodium chloride      dextrose       PRN Meds:sodium chloride, melatonin, hydrOXYzine, fentanNYL, LORazepam, sodium chloride flush, sodium chloride, acetaminophen **OR** acetaminophen, glucose, dextrose, glucagon (rDNA), dextrose, perflutren lipid microspheres    OBJECTIVE:  /60   Pulse 68   Temp 97.5 °F (36.4 °C) (Oral)   Resp 24   Ht 5' (1.524 m)   Wt 187 lb 4.8 oz (85 kg)   SpO2 92%   BMI 36.58 kg/m²   Temp  Av.6 °F (36.4 °C)  Min: 97.4 °F (36.3 °C)  Max: 97.9 °F (36.6 °C)  Constitutional: The patient is lying in bed. She is out of the ICU. Visitor present. Skin: Warm and dry. No rashes were noted. HEENT: Round and reactive pupils. Moist mucous membranes. No ulcerations or thrush. Neck: Supple to movements. Chest: No respiratory distress. No crackles. Cardiovascular: Heart sounds rhythmic and regular.   Abdomen:

## 2021-05-30 NOTE — PROGRESS NOTES
chloride (PF) 0.9 % injection 10 mL, BID  Arformoterol Tartrate (BROVANA) nebulizer solution 15 mcg, BID  budesonide (PULMICORT) nebulizer suspension 500 mcg, BID  ipratropium-albuterol (DUONEB) nebulizer solution 1 ampule, Q4H WA  insulin lispro (HUMALOG) injection vial 0-12 Units, TID WC  insulin lispro (HUMALOG) injection vial 0-6 Units, Nightly  0.9 % sodium chloride bolus, Once  sodium chloride flush 0.9 % injection 5-40 mL, PRN  0.9 % sodium chloride infusion, PRN  [Held by provider] enoxaparin (LOVENOX) injection 70 mg, Q12H  acetaminophen (TYLENOL) tablet 650 mg, Q6H PRN   Or  acetaminophen (TYLENOL) suppository 650 mg, Q6H PRN  glucose (GLUTOSE) 40 % oral gel 15 g, PRN  dextrose 50 % IV solution, PRN  glucagon (rDNA) injection 1 mg, PRN  dextrose 5 % solution, PRN  perflutren lipid microspheres (DEFINITY) injection 1.65 mg, ONCE PRN         Data Review  CBC:   Lab Results   Component Value Date    WBC 16.8 05/31/2021    RBC 4.27 05/31/2021    HGB 10.0 05/31/2021    HCT 34.8 05/31/2021    MCV 81.5 05/31/2021    MCH 23.4 05/31/2021    MCHC 28.7 05/31/2021    RDW 18.0 05/31/2021     05/31/2021    MPV 10.6 05/31/2021     CMP:    Lab Results   Component Value Date     05/31/2021    K 3.4 05/31/2021     05/31/2021    CO2 24 05/31/2021    BUN 17 05/31/2021    CREATININE 0.9 05/31/2021    GFRAA >60 05/31/2021    LABGLOM >60 05/31/2021    GLUCOSE 181 05/31/2021    PROT 7.3 05/31/2021    LABALBU 3.5 05/31/2021    CALCIUM 8.7 05/31/2021    BILITOT 0.3 05/31/2021    ALKPHOS 107 05/31/2021    AST 22 05/31/2021    ALT 14 05/31/2021     Hepatic Function Panel:    Lab Results   Component Value Date    ALKPHOS 107 05/31/2021    ALT 14 05/31/2021    AST 22 05/31/2021    PROT 7.3 05/31/2021    BILITOT 0.3 05/31/2021    LABALBU 3.5 05/31/2021     IRON:    Lab Results   Component Value Date    IRON 8 05/26/2021     Iron Saturation:  No components found for: PERCENTFE  FERRITIN:    Lab Results   Component Value Date    FERRITIN 81 05/26/2021         Assessment:     Active Problems:  ? Anemia, microcytic, iron deficiency hemoccult +  ? Septic shock, Klebsiella bacteremia, off pressors- ID following  ? Leukocytosis - ID following  ? Fever -resolved  ? Lactic acidosis -resolved  ? Acute respiratory failure  ? Bilateral PE - defer to PCP  ? Dementia  ? Hx COVID  ? EGD with bx 5/28/21 - Grade B LA classification GERD, small hiatal hernia. Stomach showed gastritis, biopsy negative for H. Pylori infection. Duodenum showed duodenitis with small AVM, biopsy pending to rule out sprue.  No active bleeding seen. Plan:     ? Serial H&H, transfuse per PCP  ? Continue Protonix 40 MG BID  ? Antibiotics per ID  ? Monitor CBC, CMP daily  ? Colonoscopy to be done outpatient - Hgb currently stable, plan for outpatient as pt does not want currently and is requiring high flow O2 -discussed with patient and family, they agree  ? Supportive care  ? Continue to monitor      Note: This report was completed utilizing computer voice recognition software. Every effort has been made to ensure accuracy, however; inadvertent computerized transcription errors may be present.      Discussed with Dr. Dom Matthew per Dr. Jojo Weldon APRN-ACNS-BC, FNP-BC 5/31/2021 9:59 AM For Dr. Brigette De León

## 2021-05-31 ENCOUNTER — APPOINTMENT (OUTPATIENT)
Dept: GENERAL RADIOLOGY | Age: 76
DRG: 871 | End: 2021-05-31
Payer: MEDICARE

## 2021-05-31 LAB
ALBUMIN SERPL-MCNC: 3.5 G/DL (ref 3.5–5.2)
ALP BLD-CCNC: 107 U/L (ref 35–104)
ALT SERPL-CCNC: 14 U/L (ref 0–32)
ANION GAP SERPL CALCULATED.3IONS-SCNC: 13 MMOL/L (ref 7–16)
ANISOCYTOSIS: ABNORMAL
AST SERPL-CCNC: 22 U/L (ref 0–31)
ATYPICAL LYMPHOCYTE RELATIVE PERCENT: 2.6 % (ref 0–4)
BASOPHILS ABSOLUTE: 0 E9/L (ref 0–0.2)
BASOPHILS RELATIVE PERCENT: 0 % (ref 0–2)
BILIRUB SERPL-MCNC: 0.3 MG/DL (ref 0–1.2)
BUN BLDV-MCNC: 17 MG/DL (ref 6–23)
CALCIUM SERPL-MCNC: 8.7 MG/DL (ref 8.6–10.2)
CHLORIDE BLD-SCNC: 105 MMOL/L (ref 98–107)
CO2: 24 MMOL/L (ref 22–29)
CREAT SERPL-MCNC: 0.9 MG/DL (ref 0.5–1)
DOHLE BODIES: ABNORMAL
EOSINOPHILS ABSOLUTE: 0 E9/L (ref 0.05–0.5)
EOSINOPHILS RELATIVE PERCENT: 0 % (ref 0–6)
GFR AFRICAN AMERICAN: >60
GFR NON-AFRICAN AMERICAN: >60 ML/MIN/1.73
GLUCOSE BLD-MCNC: 181 MG/DL (ref 74–99)
HCT VFR BLD CALC: 34.8 % (ref 34–48)
HEMOGLOBIN: 10 G/DL (ref 11.5–15.5)
HYPOCHROMIA: ABNORMAL
LYMPHOCYTES ABSOLUTE: 1.34 E9/L (ref 1.5–4)
LYMPHOCYTES RELATIVE PERCENT: 5.2 % (ref 20–42)
MAGNESIUM: 1.8 MG/DL (ref 1.6–2.6)
MCH RBC QN AUTO: 23.4 PG (ref 26–35)
MCHC RBC AUTO-ENTMCNC: 28.7 % (ref 32–34.5)
MCV RBC AUTO: 81.5 FL (ref 80–99.9)
METAMYELOCYTES RELATIVE PERCENT: 0.8 % (ref 0–1)
METER GLUCOSE: 127 MG/DL (ref 74–99)
METER GLUCOSE: 138 MG/DL (ref 74–99)
METER GLUCOSE: 138 MG/DL (ref 74–99)
METER GLUCOSE: 150 MG/DL (ref 74–99)
METER GLUCOSE: 212 MG/DL (ref 74–99)
MONOCYTES ABSOLUTE: 1.68 E9/L (ref 0.1–0.95)
MONOCYTES RELATIVE PERCENT: 9.5 % (ref 2–12)
MYELOCYTE PERCENT: 2.6 % (ref 0–0)
NEUTROPHILS ABSOLUTE: 13.94 E9/L (ref 1.8–7.3)
NEUTROPHILS RELATIVE PERCENT: 79.3 % (ref 43–80)
NUCLEATED RED BLOOD CELLS: 0 /100 WBC
OVALOCYTES: ABNORMAL
PDW BLD-RTO: 18 FL (ref 11.5–15)
PLATELET # BLD: 372 E9/L (ref 130–450)
PMV BLD AUTO: 10.6 FL (ref 7–12)
POIKILOCYTES: ABNORMAL
POLYCHROMASIA: ABNORMAL
POTASSIUM REFLEX MAGNESIUM: 3.4 MMOL/L (ref 3.5–5)
RBC # BLD: 4.27 E12/L (ref 3.5–5.5)
SCHISTOCYTES: ABNORMAL
SODIUM BLD-SCNC: 142 MMOL/L (ref 132–146)
TEAR DROP CELLS: ABNORMAL
TOTAL PROTEIN: 7.3 G/DL (ref 6.4–8.3)
WBC # BLD: 16.8 E9/L (ref 4.5–11.5)

## 2021-05-31 PROCEDURE — 71045 X-RAY EXAM CHEST 1 VIEW: CPT

## 2021-05-31 PROCEDURE — 36415 COLL VENOUS BLD VENIPUNCTURE: CPT

## 2021-05-31 PROCEDURE — 83735 ASSAY OF MAGNESIUM: CPT

## 2021-05-31 PROCEDURE — 6360000002 HC RX W HCPCS: Performed by: FAMILY MEDICINE

## 2021-05-31 PROCEDURE — 6370000000 HC RX 637 (ALT 250 FOR IP): Performed by: INTERNAL MEDICINE

## 2021-05-31 PROCEDURE — 82962 GLUCOSE BLOOD TEST: CPT

## 2021-05-31 PROCEDURE — 2580000003 HC RX 258: Performed by: INTERNAL MEDICINE

## 2021-05-31 PROCEDURE — 85025 COMPLETE CBC W/AUTO DIFF WBC: CPT

## 2021-05-31 PROCEDURE — C9113 INJ PANTOPRAZOLE SODIUM, VIA: HCPCS | Performed by: INTERNAL MEDICINE

## 2021-05-31 PROCEDURE — 2060000000 HC ICU INTERMEDIATE R&B

## 2021-05-31 PROCEDURE — 6360000002 HC RX W HCPCS: Performed by: NURSE PRACTITIONER

## 2021-05-31 PROCEDURE — 80053 COMPREHEN METABOLIC PANEL: CPT

## 2021-05-31 PROCEDURE — 94640 AIRWAY INHALATION TREATMENT: CPT

## 2021-05-31 PROCEDURE — 6360000002 HC RX W HCPCS: Performed by: INTERNAL MEDICINE

## 2021-05-31 PROCEDURE — 2700000000 HC OXYGEN THERAPY PER DAY

## 2021-05-31 PROCEDURE — APPSS30 APP SPLIT SHARED TIME 16-30 MINUTES: Performed by: NURSE PRACTITIONER

## 2021-05-31 PROCEDURE — 6370000000 HC RX 637 (ALT 250 FOR IP): Performed by: NURSE PRACTITIONER

## 2021-05-31 PROCEDURE — 99232 SBSQ HOSP IP/OBS MODERATE 35: CPT | Performed by: FAMILY MEDICINE

## 2021-05-31 PROCEDURE — 6360000002 HC RX W HCPCS

## 2021-05-31 PROCEDURE — 6370000000 HC RX 637 (ALT 250 FOR IP): Performed by: SPECIALIST

## 2021-05-31 PROCEDURE — 6370000000 HC RX 637 (ALT 250 FOR IP): Performed by: FAMILY MEDICINE

## 2021-05-31 RX ORDER — FUROSEMIDE 10 MG/ML
20 INJECTION INTRAMUSCULAR; INTRAVENOUS 2 TIMES DAILY
Status: DISCONTINUED | OUTPATIENT
Start: 2021-05-31 | End: 2021-06-01

## 2021-05-31 RX ORDER — LORAZEPAM 0.5 MG/1
0.5 TABLET ORAL EVERY 12 HOURS
Status: DISCONTINUED | OUTPATIENT
Start: 2021-05-31 | End: 2021-06-01

## 2021-05-31 RX ORDER — FUROSEMIDE 10 MG/ML
80 INJECTION INTRAMUSCULAR; INTRAVENOUS ONCE
Status: COMPLETED | OUTPATIENT
Start: 2021-05-31 | End: 2021-05-31

## 2021-05-31 RX ORDER — FUROSEMIDE 10 MG/ML
INJECTION INTRAMUSCULAR; INTRAVENOUS
Status: COMPLETED
Start: 2021-05-31 | End: 2021-05-31

## 2021-05-31 RX ORDER — POTASSIUM CHLORIDE 20 MEQ/1
40 TABLET, EXTENDED RELEASE ORAL ONCE
Status: COMPLETED | OUTPATIENT
Start: 2021-05-31 | End: 2021-05-31

## 2021-05-31 RX ADMIN — ARFORMOTEROL TARTRATE 15 MCG: 15 SOLUTION RESPIRATORY (INHALATION) at 09:06

## 2021-05-31 RX ADMIN — INSULIN LISPRO 2 UNITS: 100 INJECTION, SOLUTION INTRAVENOUS; SUBCUTANEOUS at 11:15

## 2021-05-31 RX ADMIN — LORAZEPAM 0.5 MG: 2 INJECTION INTRAMUSCULAR; INTRAVENOUS at 08:15

## 2021-05-31 RX ADMIN — MUPIROCIN: 20 OINTMENT TOPICAL at 20:51

## 2021-05-31 RX ADMIN — ACETAMINOPHEN 650 MG: 325 TABLET ORAL at 02:05

## 2021-05-31 RX ADMIN — PANTOPRAZOLE SODIUM 40 MG: 40 INJECTION, POWDER, FOR SOLUTION INTRAVENOUS at 20:50

## 2021-05-31 RX ADMIN — BUDESONIDE 500 MCG: 0.5 SUSPENSION RESPIRATORY (INHALATION) at 09:06

## 2021-05-31 RX ADMIN — FENTANYL CITRATE 25 MCG: 50 INJECTION, SOLUTION INTRAMUSCULAR; INTRAVENOUS at 21:42

## 2021-05-31 RX ADMIN — POTASSIUM CHLORIDE 40 MEQ: 1500 TABLET, EXTENDED RELEASE ORAL at 11:16

## 2021-05-31 RX ADMIN — IPRATROPIUM BROMIDE AND ALBUTEROL SULFATE 1 AMPULE: 2.5; .5 SOLUTION RESPIRATORY (INHALATION) at 09:06

## 2021-05-31 RX ADMIN — FENTANYL CITRATE 25 MCG: 50 INJECTION, SOLUTION INTRAMUSCULAR; INTRAVENOUS at 14:47

## 2021-05-31 RX ADMIN — FUROSEMIDE 20 MG: 10 INJECTION, SOLUTION INTRAMUSCULAR; INTRAVENOUS at 14:46

## 2021-05-31 RX ADMIN — LORAZEPAM 0.5 MG: 0.5 TABLET ORAL at 17:48

## 2021-05-31 RX ADMIN — CEFDINIR 300 MG: 300 CAPSULE ORAL at 20:50

## 2021-05-31 RX ADMIN — HYDROCORTISONE SODIUM SUCCINATE 100 MG: 100 INJECTION, POWDER, FOR SOLUTION INTRAMUSCULAR; INTRAVENOUS at 06:12

## 2021-05-31 RX ADMIN — CEFDINIR 300 MG: 300 CAPSULE ORAL at 08:15

## 2021-05-31 RX ADMIN — LORAZEPAM 0.5 MG: 2 INJECTION INTRAMUSCULAR; INTRAVENOUS at 00:45

## 2021-05-31 RX ADMIN — ARFORMOTEROL TARTRATE 15 MCG: 15 SOLUTION RESPIRATORY (INHALATION) at 00:14

## 2021-05-31 RX ADMIN — FENTANYL CITRATE 25 MCG: 50 INJECTION, SOLUTION INTRAMUSCULAR; INTRAVENOUS at 03:42

## 2021-05-31 RX ADMIN — HYDROCORTISONE SODIUM SUCCINATE 50 MG: 100 INJECTION, POWDER, FOR SOLUTION INTRAMUSCULAR; INTRAVENOUS at 17:49

## 2021-05-31 RX ADMIN — SODIUM CHLORIDE, PRESERVATIVE FREE 10 ML: 5 INJECTION INTRAVENOUS at 20:50

## 2021-05-31 RX ADMIN — FENTANYL CITRATE 25 MCG: 50 INJECTION, SOLUTION INTRAMUSCULAR; INTRAVENOUS at 08:15

## 2021-05-31 RX ADMIN — ENOXAPARIN SODIUM 70 MG: 80 INJECTION SUBCUTANEOUS at 20:50

## 2021-05-31 RX ADMIN — PANTOPRAZOLE SODIUM 40 MG: 40 INJECTION, POWDER, FOR SOLUTION INTRAVENOUS at 08:15

## 2021-05-31 RX ADMIN — FUROSEMIDE 80 MG: 10 INJECTION, SOLUTION INTRAMUSCULAR; INTRAVENOUS at 00:44

## 2021-05-31 RX ADMIN — MUPIROCIN: 20 OINTMENT TOPICAL at 08:16

## 2021-05-31 RX ADMIN — Medication 3 MG: at 20:50

## 2021-05-31 RX ADMIN — IPRATROPIUM BROMIDE AND ALBUTEROL SULFATE 1 AMPULE: 2.5; .5 SOLUTION RESPIRATORY (INHALATION) at 00:15

## 2021-05-31 RX ADMIN — BUDESONIDE 500 MCG: 0.5 SUSPENSION RESPIRATORY (INHALATION) at 00:15

## 2021-05-31 RX ADMIN — SODIUM CHLORIDE, PRESERVATIVE FREE 10 ML: 5 INJECTION INTRAVENOUS at 08:15

## 2021-05-31 RX ADMIN — FENTANYL CITRATE 25 MCG: 50 INJECTION, SOLUTION INTRAMUSCULAR; INTRAVENOUS at 12:24

## 2021-05-31 RX ADMIN — LORAZEPAM 0.5 MG: 2 INJECTION INTRAMUSCULAR; INTRAVENOUS at 14:46

## 2021-05-31 RX ADMIN — FUROSEMIDE 80 MG: 10 INJECTION INTRAMUSCULAR; INTRAVENOUS at 00:44

## 2021-05-31 RX ADMIN — FUROSEMIDE 20 MG: 10 INJECTION, SOLUTION INTRAMUSCULAR; INTRAVENOUS at 20:50

## 2021-05-31 RX ADMIN — FENTANYL CITRATE 25 MCG: 50 INJECTION, SOLUTION INTRAMUSCULAR; INTRAVENOUS at 17:49

## 2021-05-31 ASSESSMENT — PAIN SCALES - GENERAL
PAINLEVEL_OUTOF10: 10
PAINLEVEL_OUTOF10: 8
PAINLEVEL_OUTOF10: 10

## 2021-05-31 ASSESSMENT — PAIN DESCRIPTION - LOCATION
LOCATION: BACK

## 2021-05-31 ASSESSMENT — PAIN DESCRIPTION - PAIN TYPE
TYPE: ACUTE PAIN

## 2021-05-31 ASSESSMENT — PAIN DESCRIPTION - ORIENTATION
ORIENTATION: LOWER
ORIENTATION: LOWER
ORIENTATION: MID;LOWER

## 2021-05-31 ASSESSMENT — PAIN DESCRIPTION - DESCRIPTORS: DESCRIPTORS: ACHING

## 2021-05-31 NOTE — PROGRESS NOTES
[facility] Progress Note    Admitting Date and Time: 5/25/2021  2:10 PM  Admit Dx: Septic shock ([facility] [A41.9, R65.21]    Subjective:  Patient is being followed for Septic shock ([facility] [address] [A41.9, R65.21]     Admit 5/25 with septic shock required CCU stay on IV levophed and fluid resuscitation. Bacteremia - Klebsiella and Enterobacteriaceae   [facility]. Overnight last night increased respiratory distress, increased O2 flow rate to 15 l/m. State she still feeling SOB, denies chest pressure. No fever or chills overnight    ROS: denies fever, chills, cp, n/v, HA unless stated above.      potassium chloride  40 mEq Oral Once    cefdinir  300 mg Oral 2 times per day    mupirocin   Topical BID    pantoprazole  40 mg Intravenous BID    And    sodium chloride (PF)  10 mL Intravenous BID    Arformoterol Tartrate  15 mcg Nebulization BID    budesonide  0.5 mg Nebulization BID    ipratropium-albuterol  1 ampule Inhalation Q4H WA    insulin lispro  0-12 Units Subcutaneous TID WC    insulin lispro  0-6 Units Subcutaneous Nightly    sodium chloride  250 mL Intravenous Once    [Held by provider] enoxaparin  1 mg/kg Subcutaneous Q12H    hydrocortisone sodium succinate PF  100 mg Intravenous Q8H     sodium chloride, , PRN  melatonin, 3 mg, Nightly PRN  hydrOXYzine, 25 mg, TID PRN  fentanNYL, 25 mcg, Q2H PRN  LORazepam, 0.5 mg, Q6H PRN  sodium chloride flush, 5-40 mL, PRN  sodium chloride, 25 mL, PRN  acetaminophen, 650 mg, Q6H PRN   Or  acetaminophen, 650 mg, Q6H PRN  glucose, 15 g, PRN  dextrose, 12.5 g, PRN  glucagon (rDNA), 1 mg, PRN  dextrose, 100 mL/hr, PRN  perflutren lipid microspheres, 1.5 mL, ONCE PRN         Objective:    BP (!) 165/84   Pulse 105   Temp 97.5 °F (36.4 °C) (Oral)   Resp 24   Ht 5' (1.524 m)   Wt 184 lb 8 oz (83.7 kg)   SpO2 93% Comment: found 77% on 11L  BMI 36.03 kg/m²     General Appearance: appears generally ill, weak with tachypnea and requirements overnight felt due to volume overload, and has improved with IV Lasix. - No active bleeding noted on Endoscopies, will resume treatment dose Lovenox, follow H/H closely.      4. Anemia-stable  -hemoccult positive  -GI consulted  -EGD performed 5/28/2021 revealing gastritis and duodenitis. Biopsies obtained. No active bleeding.  -continue to monitor hemglobin, transfuse for hgb <7  -1 unit PRBCs was transfused 5/27/21 for Hgb 6.4  -continue PPI     5. DM2  -continue basal/bolus/ISS     6.  Essential hypertension  -Initially with  hypotension due to sepsis, resolved with fluid resuscitation and Levophed  -BP increasing, will wean from IV Solu-Cortef     7. Dementia  -resume home medications     8. Hypokalemia  -replete and monitor     9.  Leukocytosis  -secondary to UTI/sepsis  -trending downward today; 28.6 -> 9.9 -> 16.8, follow daily  -continue to treat infection-now on omnicef  - PE/Atelectasis contributing - PT/OT and get out of bed      NOTE: This report was transcribed using voice recognition software. Every effort was made to ensure accuracy; however, inadvertent computerized transcription errors may be present.   Electronically signed by PEDRO Enriquez CNP on 5/31/2021 at 9:33 AM

## 2021-05-31 NOTE — PROGRESS NOTES
[address] [place] [address] [facility]  EVELIAIDA  Progress Note    SUBJECTIVE:  Chief Complaint   Patient presents with    Blood Infection     sent in for sepsis rule out     Nursing reports that she had a restless night and was dyspneic. IV fluids were withheld    Review of systems:  As stated above in the chief complaint, otherwise negative. Medications:  Scheduled Meds:   cefdinir  300 mg Oral 2 times per day    mupirocin   Topical BID    pantoprazole  40 mg Intravenous BID    And    sodium chloride (PF)  10 mL Intravenous BID    Arformoterol Tartrate  15 mcg Nebulization BID    budesonide  0.5 mg Nebulization BID    ipratropium-albuterol  1 ampule Inhalation Q4H WA    insulin lispro  0-12 Units Subcutaneous TID WC    insulin lispro  0-6 Units Subcutaneous Nightly    sodium chloride  250 mL Intravenous Once    [Held by provider] enoxaparin  1 mg/kg Subcutaneous Q12H    hydrocortisone sodium succinate PF  100 mg Intravenous Q8H     Continuous Infusions:   sodium chloride      sodium chloride      dextrose       PRN Meds:sodium chloride, melatonin, hydrOXYzine, fentanNYL, LORazepam, sodium chloride flush, sodium chloride, acetaminophen **OR** acetaminophen, glucose, dextrose, glucagon (rDNA), dextrose, perflutren lipid microspheres    OBJECTIVE:  BP (!) 165/84   Pulse 105   Temp 97.5 °F (36.4 °C) (Oral)   Resp 24   Ht 5' (1.524 m)   Wt 184 lb 8 oz (83.7 kg)   SpO2 93% Comment: found 77% on 11L  BMI 36.03 kg/m²   Temp  Av.7 °F (36.5 °C)  Min: 97.5 °F (36.4 °C)  Max: 97.9 °F (36.6 °C)  Constitutional: The patient is lying in bed. Visitors present. No distress  Skin: Warm and dry. No rashes were noted. HEENT: Round and reactive pupils. Moist mucous membranes. No ulcerations or thrush. Neck: Supple to movements. Chest: No respiratory distress. No crackles. Cardiovascular: Heart sounds rhythmic and regular. Abdomen: Positive bowel sounds to auscultation.  Benign to palpation. Extremities: No edema. Bilateral TKR. Lines: Peripheral  Blair catheter.     Laboratory and Tests Review:  Lab Results   Component Value Date    WBC 16.8 (H) 05/31/2021    WBC 9.9 05/30/2021    WBC 11.0 05/29/2021    HGB 10.0 (L) 05/31/2021    HCT 34.8 05/31/2021    MCV 81.5 05/31/2021     05/31/2021     Lab Results   Component Value Date    NEUTROABS 8.01 (H) 05/30/2021    NEUTROABS 10.45 (H) 05/29/2021    NEUTROABS 15.65 (H) 05/28/2021     No results found for: CRPHS  Lab Results   Component Value Date    ALT 14 05/31/2021    AST 22 05/31/2021    ALKPHOS 107 (H) 05/31/2021    BILITOT 0.3 05/31/2021     Lab Results   Component Value Date     05/31/2021    K 3.4 05/31/2021     05/31/2021    CO2 24 05/31/2021    BUN 17 05/31/2021    CREATININE 0.9 05/31/2021    CREATININE 0.8 05/30/2021    CREATININE 0.9 05/29/2021    GFRAA >60 05/31/2021    LABGLOM >60 05/31/2021    GLUCOSE 181 05/31/2021    PROT 7.3 05/31/2021    LABALBU 3.5 05/31/2021    CALCIUM 8.7 05/31/2021    BILITOT 0.3 05/31/2021    ALKPHOS 107 05/31/2021    AST 22 05/31/2021    ALT 14 05/31/2021     Lab Results   Component Value Date    CRP 22.1 (H) 05/27/2021     No results found for: [address]  Radiology:      Microbiology:   Blood cultures 5/25/2021: Ampicillin resistant Klebsiella pneumoniae in 4 of 4 bottles  Blood cultures 5/27/2021: Negative so far  Nares screen MRSA: Positive  Urine culture ordered but never sent    ASSESSMENT:  · Complicated UTI with sepsis secondary to Klebsiella  · Klebsiella septicemia associated to complicated UTI  · Septic shock, resolved  · Leukocytosis and fever secondary to sepsis and steroids, improved    PLAN:  · Change Ceftriaxone to Cefdinir x7 days  · Patient can be discharged once she is able to drink fluids and hold food down    Adriana Yun MD  8:24 AM  5/31/2021

## 2021-05-31 NOTE — PROGRESS NOTES
Be Mariee M.D.,[facility]  Ailyn Sol D.O., F.ABARBOFrandyI., Jean Flores M.D. Chapin Power M.D., Altagracia Song M.D. Selma Lennox, D.O. Daily Pulmonary Progress Note    Patient:  Umair José 66 y.o. female MRN: [id number]     Date of Service: 5/31/2021      Synopsis     We are following patient for septic shock, acute respiratory failure with hypoxia, bilateral PE    \"CC\" shortness of breath    Code status: Limited code      Subjective      Patient was seen and examined lying in bed in no apparent distress. She is difficult to arouse. She is currently on 15 L oxygen, and the lung sounds are clear. Review of Systems:  Constitutional: Denies fever, weight loss, night sweats, and fatigue  Skin: Denies pigmentation, dark lesions, and rashes   HEENT: Denies hearing loss, tinnitus, ear drainage, epistaxis, sore throat, and hoarseness. Cardiovascular: Denies palpitations, chest pain, and chest pressure. Respiratory: Denies cough, dyspnea at rest, hemoptysis, apnea, and choking.   Gastrointestinal: Denies nausea, vomiting, poor appetite, diarrhea, heartburn or reflux  Genitourinary: Denies dysuria, frequency, urgency or hematuria  Musculoskeletal: Denies myalgias, muscle weakness, and bone pain  Neurological: Denies dizziness, vertigo, headache, and focal weakness  Psychological: Denies anxiety and depression  Endocrine: Denies heat intolerance and cold intolerance  Hematopoietic/Lymphatic: Denies bleeding problems and blood transfusions    24-hour events:  Out of ICU    Objective   Vitals: BP (!) 165/84   Pulse 105   Temp 97.5 °F (36.4 °C) (Oral)   Resp 24   Ht 5' (1.524 m)   Wt 184 lb 8 oz (83.7 kg)   SpO2 93% Comment: found 77% on 11L  BMI 36.03 kg/m²     I/O:    Intake/Output Summary (Last 24 hours) at 5/31/2021 1117  Last data filed at 5/31/2021 8215  Gross per 24 hour   Intake 1248 ml   Output 1950 ml   Net -702 ml       Vent Information  SpO2: 93 % (found 77% on 11L)                CURRENT MEDS :  Scheduled Meds:   potassium chloride  40 mEq Oral Once    hydrocortisone sodium succinate PF  50 mg Intravenous BID    cefdinir  300 mg Oral 2 times per day    mupirocin   Topical BID    pantoprazole  40 mg Intravenous BID    And    sodium chloride (PF)  10 mL Intravenous BID    Arformoterol Tartrate  15 mcg Nebulization BID    budesonide  0.5 mg Nebulization BID    ipratropium-albuterol  1 ampule Inhalation Q4H WA    insulin lispro  0-12 Units Subcutaneous TID WC    insulin lispro  0-6 Units Subcutaneous Nightly    sodium chloride  250 mL Intravenous Once    [Held by provider] enoxaparin  1 mg/kg Subcutaneous Q12H       Physical Exam:  General Appearance: appears comfortable in no acute distress. Morbidly obese  HEENT: Normocephalic atraumatic without obvious abnormality   Neck: Lips, mucosa, and tongue normal.  Supple, symmetrical, trachea midline, no adenopathy;thyroid:  no enlargement/tenderness/nodules or JVD. Lung: Breath sounds CTA, diminished. Respirations   unlabored. Symmetrical expansion. Heart: RRR, normal S1, S2. No MRG  Abdomen: Soft, NT, ND. BS present x 4 quadrants. No bruit or organomegaly. Extremities: Pedal pulses 2+ symmetric b/l. Extremities normal, no cyanosis, clubbing, or edema. Musculokeletal: No joint swelling, no muscle tenderness. ROM normal in all joints of extremities. Neurologic: Mental status: Alert     Pertinent/ New Labs and Imaging Studies     Imaging Personally Reviewed:  5/31/2021 cxr  Mild cardiomegaly with vascular congestion and widespread interstitial   changes which may reflect widespread pulmonary edema, similar to the prior   examination.       Probable small right pleural effusion.      5/25/2021 CTA chest  1.  There are limitation on the present study due motion artifacts caused   misregistration of the images as above discussed.       2.  However there appears to be signs for acute/subacute pulmonary embolus in the lower lobe pulmonary arteries more noticeable on the right lower lobe as   above commented. ECHO  5/26/2021  Left ventricular internal dimensions were normal in diastole and systole. Borderline concentric left ventricular hypertrophy. No regional wall motion abnormalities seen. Normal left ventricular ejection fraction. Mild mitral annular calcification. The aortic valve appears mildly sclerotic. Labs:  Lab Results   Component Value Date    WBC 16.8 05/31/2021    HGB 10.0 05/31/2021    HCT 34.8 05/31/2021    MCV 81.5 05/31/2021    MCH 23.4 05/31/2021    MCHC 28.7 05/31/2021    RDW 18.0 05/31/2021     05/31/2021    MPV 10.6 05/31/2021     Lab Results   Component Value Date     05/31/2021    K 3.4 05/31/2021     05/31/2021    CO2 24 05/31/2021    BUN 17 05/31/2021    CREATININE 0.9 05/31/2021    LABALBU 3.5 05/31/2021    CALCIUM 8.7 05/31/2021    GFRAA >60 05/31/2021    LABGLOM >60 05/31/2021     No results found for: PROTIME, INR  No results for input(s): PROBNP in the last 72 hours. No results for input(s): PROCAL in the last 72 hours. This SmartLink has not been configured with any valid records. Assessment:    1. Acute respiratory failure with hypoxia  2. Bilateral PE  3. Shock, sepsis  4. Gastritis  5. Type 2 diabetes mellitus      Plan:   1. Wean oxygen as tolerated, keep sats greater than 90 to 92%, increased from 4 to 5 L to 15 L overnight for shortness of breath  2. Brovana/budesonide twice a day  3. DuoNebs every 4 hours  4. DVT/GI prophylaxis  5. Solu-Cortef 50 mg twice a day  6. EGD shows gastritis, GERD, duodenitis, small nonbleeding AVM, plan for colonoscopy  7. Antibiotics per ID  8. Monitor H/H  9. Lovenox for anticoagulation      This plan of care was reviewed in collaboration with   Electronically signed by PEDRO Kong CNP on 5/31/2021 at 11:17 AM      I personally saw, examined, and cared for the patient.  Labs, medications, radiographs reviewed. I agree with history exam and plans detailed in NP note.   Carrie Hayden MD

## 2021-06-01 ENCOUNTER — APPOINTMENT (OUTPATIENT)
Dept: GENERAL RADIOLOGY | Age: 76
DRG: 871 | End: 2021-06-01
Payer: MEDICARE

## 2021-06-01 LAB
ALBUMIN SERPL-MCNC: 3.2 G/DL (ref 3.5–5.2)
ALP BLD-CCNC: 77 U/L (ref 35–104)
ALT SERPL-CCNC: 10 U/L (ref 0–32)
ANION GAP SERPL CALCULATED.3IONS-SCNC: 10 MMOL/L (ref 7–16)
ANION GAP SERPL CALCULATED.3IONS-SCNC: 12 MMOL/L (ref 7–16)
AST SERPL-CCNC: 15 U/L (ref 0–31)
B.E.: 2.7 MMOL/L (ref -3–3)
BASOPHILS ABSOLUTE: 0.02 E9/L (ref 0–0.2)
BASOPHILS RELATIVE PERCENT: 0.2 % (ref 0–2)
BILIRUB SERPL-MCNC: 0.3 MG/DL (ref 0–1.2)
BLOOD CULTURE, ROUTINE: NORMAL
BUN BLDV-MCNC: 16 MG/DL (ref 6–23)
BUN BLDV-MCNC: 16 MG/DL (ref 6–23)
CALCIUM SERPL-MCNC: 8.2 MG/DL (ref 8.6–10.2)
CALCIUM SERPL-MCNC: 8.5 MG/DL (ref 8.6–10.2)
CHLORIDE BLD-SCNC: 103 MMOL/L (ref 98–107)
CHLORIDE BLD-SCNC: 105 MMOL/L (ref 98–107)
CO2: 28 MMOL/L (ref 22–29)
CO2: 31 MMOL/L (ref 22–29)
COHB: 0.4 % (ref 0–1.5)
CREAT SERPL-MCNC: 0.9 MG/DL (ref 0.5–1)
CREAT SERPL-MCNC: 1 MG/DL (ref 0.5–1)
CRITICAL: ABNORMAL
DATE ANALYZED: ABNORMAL
DATE OF COLLECTION: ABNORMAL
EOSINOPHILS ABSOLUTE: 0.01 E9/L (ref 0.05–0.5)
EOSINOPHILS RELATIVE PERCENT: 0.1 % (ref 0–6)
GFR AFRICAN AMERICAN: >60
GFR AFRICAN AMERICAN: >60
GFR NON-AFRICAN AMERICAN: 54 ML/MIN/1.73
GFR NON-AFRICAN AMERICAN: >60 ML/MIN/1.73
GLUCOSE BLD-MCNC: 116 MG/DL (ref 74–99)
GLUCOSE BLD-MCNC: 119 MG/DL (ref 74–99)
HCO3: 27.5 MMOL/L (ref 22–26)
HCT VFR BLD CALC: 30.1 % (ref 34–48)
HEMOGLOBIN: 9.1 G/DL (ref 11.5–15.5)
HHB: 6.9 % (ref 0–5)
IMMATURE GRANULOCYTES #: 0.58 E9/L
IMMATURE GRANULOCYTES %: 4.8 % (ref 0–5)
LAB: ABNORMAL
LIPASE: 82 U/L (ref 13–60)
LYMPHOCYTES ABSOLUTE: 1.48 E9/L (ref 1.5–4)
LYMPHOCYTES RELATIVE PERCENT: 12.2 % (ref 20–42)
Lab: ABNORMAL
MAGNESIUM: 1.5 MG/DL (ref 1.6–2.6)
MAGNESIUM: 1.7 MG/DL (ref 1.6–2.6)
MCH RBC QN AUTO: 24.3 PG (ref 26–35)
MCHC RBC AUTO-ENTMCNC: 30.2 % (ref 32–34.5)
MCV RBC AUTO: 80.3 FL (ref 80–99.9)
METER GLUCOSE: 122 MG/DL (ref 74–99)
METER GLUCOSE: 130 MG/DL (ref 74–99)
METER GLUCOSE: 147 MG/DL (ref 74–99)
METER GLUCOSE: 148 MG/DL (ref 74–99)
METHB: 0.1 % (ref 0–1.5)
MODE: ABNORMAL
MONOCYTES ABSOLUTE: 0.85 E9/L (ref 0.1–0.95)
MONOCYTES RELATIVE PERCENT: 7 % (ref 2–12)
NEUTROPHILS ABSOLUTE: 9.2 E9/L (ref 1.8–7.3)
NEUTROPHILS RELATIVE PERCENT: 75.7 % (ref 43–80)
O2 CONTENT: 12.8 ML/DL
O2 SATURATION: 93.1 % (ref 92–98.5)
O2HB: 92.6 % (ref 94–97)
OPERATOR ID: ABNORMAL
PATIENT TEMP: 37 C
PCO2: 43.5 MMHG (ref 35–45)
PDW BLD-RTO: 18.1 FL (ref 11.5–15)
PH BLOOD GAS: 7.42 (ref 7.35–7.45)
PLATELET # BLD: 350 E9/L (ref 130–450)
PMV BLD AUTO: 11.2 FL (ref 7–12)
PO2: 67.4 MMHG (ref 75–100)
POTASSIUM REFLEX MAGNESIUM: 3.1 MMOL/L (ref 3.5–5)
POTASSIUM REFLEX MAGNESIUM: 3.1 MMOL/L (ref 3.5–5)
PROCALCITONIN: 0.48 NG/ML (ref 0–0.08)
RBC # BLD: 3.75 E12/L (ref 3.5–5.5)
SODIUM BLD-SCNC: 144 MMOL/L (ref 132–146)
SODIUM BLD-SCNC: 145 MMOL/L (ref 132–146)
SOURCE, BLOOD GAS: ABNORMAL
THB: 9.8 G/DL (ref 11.5–16.5)
TIME ANALYZED: 1114
TOTAL PROTEIN: 6.1 G/DL (ref 6.4–8.3)
WBC # BLD: 12.1 E9/L (ref 4.5–11.5)

## 2021-06-01 PROCEDURE — 94640 AIRWAY INHALATION TREATMENT: CPT

## 2021-06-01 PROCEDURE — 2060000000 HC ICU INTERMEDIATE R&B

## 2021-06-01 PROCEDURE — 6370000000 HC RX 637 (ALT 250 FOR IP): Performed by: SPECIALIST

## 2021-06-01 PROCEDURE — 83690 ASSAY OF LIPASE: CPT

## 2021-06-01 PROCEDURE — 6360000002 HC RX W HCPCS: Performed by: INTERNAL MEDICINE

## 2021-06-01 PROCEDURE — 80053 COMPREHEN METABOLIC PANEL: CPT

## 2021-06-01 PROCEDURE — 74018 RADEX ABDOMEN 1 VIEW: CPT

## 2021-06-01 PROCEDURE — 72110 X-RAY EXAM L-2 SPINE 4/>VWS: CPT

## 2021-06-01 PROCEDURE — 82962 GLUCOSE BLOOD TEST: CPT

## 2021-06-01 PROCEDURE — 85025 COMPLETE CBC W/AUTO DIFF WBC: CPT

## 2021-06-01 PROCEDURE — 99232 SBSQ HOSP IP/OBS MODERATE 35: CPT | Performed by: FAMILY MEDICINE

## 2021-06-01 PROCEDURE — 84145 PROCALCITONIN (PCT): CPT

## 2021-06-01 PROCEDURE — 36415 COLL VENOUS BLD VENIPUNCTURE: CPT

## 2021-06-01 PROCEDURE — 6370000000 HC RX 637 (ALT 250 FOR IP): Performed by: INTERNAL MEDICINE

## 2021-06-01 PROCEDURE — 80048 BASIC METABOLIC PNL TOTAL CA: CPT

## 2021-06-01 PROCEDURE — 83735 ASSAY OF MAGNESIUM: CPT

## 2021-06-01 PROCEDURE — 72072 X-RAY EXAM THORAC SPINE 3VWS: CPT

## 2021-06-01 PROCEDURE — 6370000000 HC RX 637 (ALT 250 FOR IP): Performed by: FAMILY MEDICINE

## 2021-06-01 PROCEDURE — 2700000000 HC OXYGEN THERAPY PER DAY

## 2021-06-01 PROCEDURE — 6360000002 HC RX W HCPCS: Performed by: NURSE PRACTITIONER

## 2021-06-01 PROCEDURE — 2580000003 HC RX 258: Performed by: INTERNAL MEDICINE

## 2021-06-01 PROCEDURE — 6360000002 HC RX W HCPCS: Performed by: FAMILY MEDICINE

## 2021-06-01 PROCEDURE — 94660 CPAP INITIATION&MGMT: CPT

## 2021-06-01 PROCEDURE — 82805 BLOOD GASES W/O2 SATURATION: CPT

## 2021-06-01 PROCEDURE — C1751 CATH, INF, PER/CENT/MIDLINE: HCPCS

## 2021-06-01 PROCEDURE — 6370000000 HC RX 637 (ALT 250 FOR IP): Performed by: NURSE PRACTITIONER

## 2021-06-01 PROCEDURE — C9113 INJ PANTOPRAZOLE SODIUM, VIA: HCPCS | Performed by: INTERNAL MEDICINE

## 2021-06-01 PROCEDURE — APPSS30 APP SPLIT SHARED TIME 16-30 MINUTES: Performed by: NURSE PRACTITIONER

## 2021-06-01 RX ORDER — ESCITALOPRAM OXALATE 10 MG/1
10 TABLET ORAL DAILY
Status: DISCONTINUED | OUTPATIENT
Start: 2021-06-01 | End: 2021-06-01

## 2021-06-01 RX ORDER — ACETAMINOPHEN 500 MG
1000 TABLET ORAL EVERY 8 HOURS
Status: DISCONTINUED | OUTPATIENT
Start: 2021-06-01 | End: 2021-06-03 | Stop reason: HOSPADM

## 2021-06-01 RX ORDER — FUROSEMIDE 10 MG/ML
40 INJECTION INTRAMUSCULAR; INTRAVENOUS 2 TIMES DAILY
Status: DISCONTINUED | OUTPATIENT
Start: 2021-06-01 | End: 2021-06-03 | Stop reason: HOSPADM

## 2021-06-01 RX ORDER — ESCITALOPRAM OXALATE 10 MG/1
20 TABLET ORAL DAILY
Status: DISCONTINUED | OUTPATIENT
Start: 2021-06-01 | End: 2021-06-03 | Stop reason: HOSPADM

## 2021-06-01 RX ORDER — LIDOCAINE 4 G/G
1 PATCH TOPICAL DAILY
Status: DISCONTINUED | OUTPATIENT
Start: 2021-06-01 | End: 2021-06-03 | Stop reason: HOSPADM

## 2021-06-01 RX ORDER — DONEPEZIL HYDROCHLORIDE 5 MG/1
5 TABLET, FILM COATED ORAL NIGHTLY
Status: DISCONTINUED | OUTPATIENT
Start: 2021-06-01 | End: 2021-06-03 | Stop reason: HOSPADM

## 2021-06-01 RX ORDER — POTASSIUM CHLORIDE 20 MEQ/1
40 TABLET, EXTENDED RELEASE ORAL ONCE
Status: DISCONTINUED | OUTPATIENT
Start: 2021-06-01 | End: 2021-06-01

## 2021-06-01 RX ORDER — OXYCODONE HYDROCHLORIDE 5 MG/1
5 TABLET ORAL EVERY 6 HOURS PRN
Status: DISCONTINUED | OUTPATIENT
Start: 2021-06-01 | End: 2021-06-03 | Stop reason: HOSPADM

## 2021-06-01 RX ORDER — MAGNESIUM SULFATE IN WATER 40 MG/ML
2000 INJECTION, SOLUTION INTRAVENOUS ONCE
Status: COMPLETED | OUTPATIENT
Start: 2021-06-01 | End: 2021-06-01

## 2021-06-01 RX ORDER — POTASSIUM CHLORIDE 20 MEQ/1
40 TABLET, EXTENDED RELEASE ORAL
Status: DISCONTINUED | OUTPATIENT
Start: 2021-06-01 | End: 2021-06-03 | Stop reason: HOSPADM

## 2021-06-01 RX ORDER — POTASSIUM CHLORIDE 20 MEQ/1
40 TABLET, EXTENDED RELEASE ORAL ONCE
Status: COMPLETED | OUTPATIENT
Start: 2021-06-01 | End: 2021-06-01

## 2021-06-01 RX ADMIN — MAGNESIUM SULFATE HEPTAHYDRATE 2000 MG: 40 INJECTION, SOLUTION INTRAVENOUS at 19:00

## 2021-06-01 RX ADMIN — CEFDINIR 300 MG: 300 CAPSULE ORAL at 08:18

## 2021-06-01 RX ADMIN — MUPIROCIN: 20 OINTMENT TOPICAL at 08:18

## 2021-06-01 RX ADMIN — ACETAMINOPHEN 1000 MG: 500 TABLET ORAL at 21:26

## 2021-06-01 RX ADMIN — SODIUM CHLORIDE, PRESERVATIVE FREE 10 ML: 5 INJECTION INTRAVENOUS at 08:18

## 2021-06-01 RX ADMIN — DONEPEZIL HYDROCHLORIDE 5 MG: 5 TABLET, FILM COATED ORAL at 21:25

## 2021-06-01 RX ADMIN — BUDESONIDE 500 MCG: 0.5 SUSPENSION RESPIRATORY (INHALATION) at 19:42

## 2021-06-01 RX ADMIN — MUPIROCIN: 20 OINTMENT TOPICAL at 21:27

## 2021-06-01 RX ADMIN — HYDROCORTISONE SODIUM SUCCINATE 50 MG: 100 INJECTION, POWDER, FOR SOLUTION INTRAMUSCULAR; INTRAVENOUS at 05:19

## 2021-06-01 RX ADMIN — BUDESONIDE 500 MCG: 0.5 SUSPENSION RESPIRATORY (INHALATION) at 07:48

## 2021-06-01 RX ADMIN — IPRATROPIUM BROMIDE AND ALBUTEROL SULFATE 1 AMPULE: 2.5; .5 SOLUTION RESPIRATORY (INHALATION) at 07:48

## 2021-06-01 RX ADMIN — FUROSEMIDE 40 MG: 10 INJECTION, SOLUTION INTRAMUSCULAR; INTRAVENOUS at 17:55

## 2021-06-01 RX ADMIN — IPRATROPIUM BROMIDE AND ALBUTEROL SULFATE 1 AMPULE: 2.5; .5 SOLUTION RESPIRATORY (INHALATION) at 15:48

## 2021-06-01 RX ADMIN — ACETAMINOPHEN 650 MG: 325 TABLET ORAL at 05:52

## 2021-06-01 RX ADMIN — ENOXAPARIN SODIUM 70 MG: 80 INJECTION SUBCUTANEOUS at 08:18

## 2021-06-01 RX ADMIN — ARFORMOTEROL TARTRATE 15 MCG: 15 SOLUTION RESPIRATORY (INHALATION) at 07:48

## 2021-06-01 RX ADMIN — ARFORMOTEROL TARTRATE 15 MCG: 15 SOLUTION RESPIRATORY (INHALATION) at 19:42

## 2021-06-01 RX ADMIN — ENOXAPARIN SODIUM 80 MG: 80 INJECTION SUBCUTANEOUS at 21:26

## 2021-06-01 RX ADMIN — PANTOPRAZOLE SODIUM 40 MG: 40 INJECTION, POWDER, FOR SOLUTION INTRAVENOUS at 21:26

## 2021-06-01 RX ADMIN — FENTANYL CITRATE 25 MCG: 50 INJECTION, SOLUTION INTRAMUSCULAR; INTRAVENOUS at 00:44

## 2021-06-01 RX ADMIN — Medication 3 MG: at 21:26

## 2021-06-01 RX ADMIN — CEFDINIR 300 MG: 300 CAPSULE ORAL at 21:25

## 2021-06-01 RX ADMIN — POTASSIUM CHLORIDE 40 MEQ: 1500 TABLET, EXTENDED RELEASE ORAL at 19:00

## 2021-06-01 RX ADMIN — IPRATROPIUM BROMIDE AND ALBUTEROL SULFATE 1 AMPULE: 2.5; .5 SOLUTION RESPIRATORY (INHALATION) at 19:42

## 2021-06-01 RX ADMIN — IPRATROPIUM BROMIDE AND ALBUTEROL SULFATE 1 AMPULE: 2.5; .5 SOLUTION RESPIRATORY (INHALATION) at 12:25

## 2021-06-01 RX ADMIN — PANTOPRAZOLE SODIUM 40 MG: 40 INJECTION, POWDER, FOR SOLUTION INTRAVENOUS at 08:18

## 2021-06-01 RX ADMIN — FUROSEMIDE 20 MG: 10 INJECTION, SOLUTION INTRAMUSCULAR; INTRAVENOUS at 08:17

## 2021-06-01 RX ADMIN — SODIUM CHLORIDE, PRESERVATIVE FREE 10 ML: 5 INJECTION INTRAVENOUS at 21:26

## 2021-06-01 RX ADMIN — LORAZEPAM 0.5 MG: 2 INJECTION INTRAMUSCULAR; INTRAVENOUS at 06:53

## 2021-06-01 RX ADMIN — FENTANYL CITRATE 25 MCG: 50 INJECTION, SOLUTION INTRAMUSCULAR; INTRAVENOUS at 08:17

## 2021-06-01 RX ADMIN — ESCITALOPRAM OXALATE 20 MG: 10 TABLET ORAL at 21:25

## 2021-06-01 RX ADMIN — POTASSIUM CHLORIDE 40 MEQ: 1500 TABLET, EXTENDED RELEASE ORAL at 08:18

## 2021-06-01 RX ADMIN — LORAZEPAM 0.5 MG: 0.5 TABLET ORAL at 05:19

## 2021-06-01 RX ADMIN — FENTANYL CITRATE 25 MCG: 50 INJECTION, SOLUTION INTRAMUSCULAR; INTRAVENOUS at 03:56

## 2021-06-01 ASSESSMENT — PAIN SCALES - GENERAL
PAINLEVEL_OUTOF10: 7
PAINLEVEL_OUTOF10: 9
PAINLEVEL_OUTOF10: 8
PAINLEVEL_OUTOF10: 8
PAINLEVEL_OUTOF10: 3
PAINLEVEL_OUTOF10: 6

## 2021-06-01 ASSESSMENT — PAIN DESCRIPTION - DESCRIPTORS
DESCRIPTORS: ACHING
DESCRIPTORS: ACHING

## 2021-06-01 ASSESSMENT — PAIN DESCRIPTION - LOCATION
LOCATION: HEAD
LOCATION: BACK

## 2021-06-01 ASSESSMENT — PAIN DESCRIPTION - FREQUENCY
FREQUENCY: INTERMITTENT
FREQUENCY: CONTINUOUS

## 2021-06-01 ASSESSMENT — PAIN DESCRIPTION - ORIENTATION: ORIENTATION: LOWER

## 2021-06-01 ASSESSMENT — PAIN DESCRIPTION - PAIN TYPE
TYPE: ACUTE PAIN
TYPE: ACUTE PAIN

## 2021-06-01 ASSESSMENT — PAIN DESCRIPTION - PROGRESSION: CLINICAL_PROGRESSION: NOT CHANGED

## 2021-06-01 NOTE — PLAN OF CARE
Problem: Altered GI function (NC-1.4)  Goal: Food and/or Nutrient Delivery  Description: Individualized approach for food/nutrient provision.   Outcome: Met This Shift

## 2021-06-01 NOTE — PROGRESS NOTES
Roxana Rios M.D.,[facility]  Joselito Harris D.O., F.A.C.O.I., Levi Snowden M.D. Rm Smiley M.D., Pedro Triana M.D. Purvi Aguilera D.O. Daily Pulmonary Progress Note    Patient:  Sravani Apodaca 66 y.o. female MRN: [id number]     Date of Service: 6/1/2021      Synopsis     We are following patient for septic shock, acute respiratory failure with hypoxia, bilateral PE    \"CC\" shortness of breath    Code status: Limited  Intubation/Re-intubation No    Defibrillation/Cardioversion No    Chest Compressions No    Resuscitative Medications No    Other DNR-CCA/No Intubation, ok for NIV/CPAP/BIPAP            Subjective      Patient was seen and examined lying in bed in no apparent distress. She is difficult to arouse. Per staff has not been sleeping well at night. Having a lot of back discomfort. she is currently on 15 L oxygen weaned to 12 L saturation maintained 94% at rest.  Tolerating aggressive diuresis 3 L urine output in last 24 hours. Review of Systems: Very weak difficult to obtain  Constitutional: Denies fever, weight loss, night sweats, and fatigue  Skin: Denies pigmentation, dark lesions, and rashes   HEENT: Denies hearing loss, tinnitus, ear drainage, epistaxis, sore throat, and hoarseness. Cardiovascular: Denies palpitations, chest pain, and chest pressure.   Respiratory: Dyspnea  Gastrointestinal: Denies nausea, vomiting, poor appetite, diarrhea, heartburn or reflux  Genitourinary: Denies dysuria, frequency, urgency or hematuria  Musculoskeletal: Back pain      24-hour events:  Hypoxemia    Objective   Vitals: /68   Pulse 97   Temp 97.5 °F (36.4 °C) (Oral)   Resp 18   Ht 5' (1.524 m)   Wt 185 lb 2 oz (84 kg)   SpO2 96%   BMI 36.15 kg/m²     I/O:    Intake/Output Summary (Last 24 hours) at 6/1/2021 1500  Last data filed at 6/1/2021 1432  Gross per 24 hour   Intake 100 ml   Output 4100 ml   Net -4000 ml       Vent Information  Skin Assessment: Clean, dry, & intact  FiO2 : 60 %  SpO2: 96 %  I Time/ I Time %: 0.95 s  Mask Type: Full face mask  Mask Size: Medium       IPAP: 12 cmH20  CPAP/EPAP: 6 cmH2O     CURRENT MEDS :  Scheduled Meds:   furosemide  40 mg Intravenous BID    acetaminophen  1,000 mg Oral Q8H    lidocaine  1 patch Transdermal Daily    [START ON 6/2/2021] hydrocortisone sodium succinate PF  50 mg Intravenous Daily    enoxaparin  1 mg/kg Subcutaneous Q12H    potassium chloride  40 mEq Oral Once    donepezil  5 mg Oral Nightly    escitalopram  20 mg Oral Daily    cefdinir  300 mg Oral 2 times per day    mupirocin   Topical BID    pantoprazole  40 mg Intravenous BID    And    sodium chloride (PF)  10 mL Intravenous BID    Arformoterol Tartrate  15 mcg Nebulization BID    budesonide  0.5 mg Nebulization BID    ipratropium-albuterol  1 ampule Inhalation Q4H WA    insulin lispro  0-12 Units Subcutaneous TID WC    insulin lispro  0-6 Units Subcutaneous Nightly    sodium chloride  250 mL Intravenous Once       Physical Exam:  General Appearance: appears comfortable in no acute distress. Morbidly obese  HEENT: Normocephalic atraumatic without obvious abnormality   Neck: Lips, mucosa, and tongue normal.  Supple, symmetrical, trachea midline, no adenopathy;thyroid:  no enlargement/tenderness/nodules or JVD. Lung: Breath sounds bilateral crackles, diminished. Respirations   unlabored. Symmetrical expansion. Heart: RRR, normal S1, S2. No MRG  Abdomen: Soft, NT, ND. BS present x 4 quadrants. No bruit or organomegaly. Extremities: Pedal pulses 2+ symmetric b/l. Extremities normal, no cyanosis, clubbing, generalized peripheral edema  Musculokeletal: No joint swelling, no muscle tenderness. ROM normal in all joints of extremities.    Neurologic: Mental status: Alert     Pertinent/ New Labs and Imaging Studies     Imaging Personally Reviewed:  5/31/2021 cxr  Mild cardiomegaly with vascular congestion and widespread interstitial   changes which may reflect widespread pulmonary edema, similar to the prior   examination.       Probable small right pleural effusion. 5/25/2021 CTA chest  1.  There are limitation on the present study due motion artifacts caused   misregistration of the images as above discussed.       2.  However there appears to be signs for acute/subacute pulmonary embolus in   the lower lobe pulmonary arteries more noticeable on the right lower lobe as   above commented. ECHO  5/26/2021  Left ventricular internal dimensions were normal in diastole and systole. Borderline concentric left ventricular hypertrophy. No regional wall motion abnormalities seen. Normal left ventricular ejection fraction. Mild mitral annular calcification. The aortic valve appears mildly sclerotic. Labs:  Lab Results   Component Value Date    WBC 12.1 06/01/2021    HGB 9.1 06/01/2021    HCT 30.1 06/01/2021    MCV 80.3 06/01/2021    MCH 24.3 06/01/2021    MCHC 30.2 06/01/2021    RDW 18.1 06/01/2021     06/01/2021    MPV 11.2 06/01/2021     Lab Results   Component Value Date     06/01/2021    K 3.1 06/01/2021     06/01/2021    CO2 28 06/01/2021    BUN 16 06/01/2021    CREATININE 0.9 06/01/2021    LABALBU 3.2 06/01/2021    CALCIUM 8.5 06/01/2021    GFRAA >60 06/01/2021    LABGLOM >60 06/01/2021     No results found for: PROTIME, INR  No results for input(s): PROBNP in the last 72 hours. No results for input(s): PROCAL in the last 72 hours. This SmartLink has not been configured with any valid records. Assessment:    1. Acute respiratory failure with hypoxia  2. Bilateral pulmonary embolism  3. Shock, sepsis  4. Heart failure with preserved EF 55 to 60%  5. Fluid volume overload/pulmonary edema  6. Obesity hypoventilation syndrome  7. Back pain  8. EGD with bx 5/28/21 - Grade B LA classification GERD, small hiatal hernia. Stomach showed gastritis, biopsy negative for H. Pylori infection.  Duodenum showed duodenitis with small AVM, biopsy pending to rule out sprue.  No active bleeding seen. 9. Type 2 diabetes mellitus      Plan:   1. Oxygen therapy decreased to 12 L high flow nasal cannula keep greater than 92%  2. ABG today with no evidence of hypercapnia  3. Add BiPAP 12/6 for respiratory support at bedtime and during day with naps, as needed for hypoxia. Limited CODE STATUS okay for NIV,  no for intubation  4. Scheduled bronchodilators -Brovana/budesonide twice a day, DuoNebs every 4 hours while awake  5. Repeat chest x-ray in a.m. 6. Continue aggressive diuresis Lasix 40 mg IV twice daily, 3 L urine output in past 24 hours  7. Solu-Cortef 50 mg decreased to daily  8. GI service following for GERD, gastritis. 9. Antibiotics per ID-switch to CLARK ABEL  10. Add procalcitonin  11. Monitor H/H, 9.1  12. 2D echo 5/26/2021 with no evidence of RV strain. 13. Therapeutic dose Lovenox 1 mg/kg for anticoagulation  14. GI prophylaxis-Protonix      This plan of care was reviewed in collaboration with   Electronically signed by PEDRO Kramer CNP on 6/1/2021 at 3:00 PM    I personally saw, examined, and cared for the patient. Labs, medications, radiographs reviewed. I agree with history exam and plans detailed in NP note.   Ashley Garsia MD

## 2021-06-01 NOTE — PROGRESS NOTES
[facility] Progress Note    Admitting Date and Time: 5/25/2021  2:10 PM  Admit Dx: Septic shock ([facility] [address] [A41.9, R65.21]    Subjective:  Patient is being followed for Septic shock ([facility] [address] [A41.9, R65.21]     Has been complaining of ongoing lumbo-sacral back pain for which she frequently requests Fentanyl and has received 8 doses in past 24 hours. Also very anxious, and had ativan this AM.    She was screaming in pain for nursing staff early this AM, but now is hypersomnolent. Arouses and answers questions. ROS: denies fever, chills, cp, sob, n/v, HA unless stated above.  hydrocortisone sodium succinate PF  50 mg Intravenous BID    LORazepam  0.5 mg Oral Q12H    furosemide  20 mg Intravenous BID    cefdinir  300 mg Oral 2 times per day    mupirocin   Topical BID    pantoprazole  40 mg Intravenous BID    And    sodium chloride (PF)  10 mL Intravenous BID    Arformoterol Tartrate  15 mcg Nebulization BID    budesonide  0.5 mg Nebulization BID    ipratropium-albuterol  1 ampule Inhalation Q4H WA    insulin lispro  0-12 Units Subcutaneous TID WC    insulin lispro  0-6 Units Subcutaneous Nightly    sodium chloride  250 mL Intravenous Once    enoxaparin  1 mg/kg Subcutaneous Q12H     sodium chloride, , PRN  melatonin, 3 mg, Nightly PRN  hydrOXYzine, 25 mg, TID PRN  fentanNYL, 25 mcg, Q2H PRN  LORazepam, 0.5 mg, Q6H PRN  sodium chloride flush, 5-40 mL, PRN  sodium chloride, 25 mL, PRN  acetaminophen, 650 mg, Q6H PRN   Or  acetaminophen, 650 mg, Q6H PRN  glucose, 15 g, PRN  dextrose, 12.5 g, PRN  glucagon (rDNA), 1 mg, PRN  dextrose, 100 mL/hr, PRN  perflutren lipid microspheres, 1.5 mL, ONCE PRN         Objective:    BP (!) 166/75   Pulse 104   Temp 97.5 °F (36.4 °C) (Oral)   Resp 22   Ht 5' (1.524 m)   Wt 185 lb 2 oz (84 kg)   SpO2 98%   BMI 36.15 kg/m²     General Appearance: appears pale, generally weak and somnolent.    Skin: warm and dry  Head: normocephalic and atraumatic  Eyes: pupils equal, round, and reactive to light, extraocular eye movements intact, conjunctivae normal  Neck: neck supple and non tender without mass   Pulmonary/Chest: she has crackles and rhonchi in both bases  Cardiovascular: normal rate, normal S1 and S2 and no carotid bruits  Abdomen: soft, non-tender, non-distended, normal bowel sounds, no masses or organomegaly  Extremities: no cyanosis, no clubbing and no edema  Neurologic: no cranial nerve deficit and speech normal        Recent Labs     05/30/21 0311 05/31/21 0301 06/01/21  0338    142 145   K 3.6 3.4* 3.1*    105 105   CO2 24 24 28   BUN 14 17 16   CREATININE 0.8 0.9 0.9   GLUCOSE 144* 181* 119*   CALCIUM 8.1* 8.7 8.5*       Recent Labs     05/30/21 0311 05/31/21 0301 06/01/21  0338   WBC 9.9 16.8* 12.1*   RBC 3.32* 4.27 3.75   HGB 7.8* 10.0* 9.1*   HCT 26.2* 34.8 30.1*   MCV 78.9* 81.5 80.3   MCH 23.5* 23.4* 24.3*   MCHC 29.8* 28.7* 30.2*   RDW 17.5* 18.0* 18.1*    372 350   MPV 11.7 10.6 11.2       Radiology:     Assessment:    Active Problems:    Septic shock (HCC)    Complicated UTI (urinary tract infection)  Resolved Problems:    * No resolved hospital problems.  *      Plan:  1.  Sepsis due to complicated UTI, as well as Klebsiella and Enterobacteriaceae bacteremia   - Sepsis resolved, weaned from Levophed and off pressors -> transferred out of CCU 5/29.  - BP stable > 737 systolic tapering Solu-cortef.  -thus far has received cefepime, ertapenem, vancomycin, rocephin  -has art and central lines  -WBC trend down 12.8  -ceftriaxone now changed to omnicef        2.  GALEN  -improved  -likely related to sepsis and hypotension  -continue to monitor GFR and creatinine  -Bicarb improved, bicarb drip discontinued  - Off IV fluids and now hypervolemic, receiving lasix and will follow BMP in AM     3.  PE, bialteral-stable  -needed 5L of supplemental O2 via NC on admission,   - Increased O2 requirements felt due to volume overload, and has been started on IV Lasix BID  - No active bleeding noted on Endoscopies, will resume treatment dose Lovenox, follow H/H closely.      4.  Anemia-stable  -hemoccult positive  -GI consulted  -EGD performed 5/28/2021 revealing gastritis and duodenitis.  Biopsies obtained.  No active bleeding.  -continue to monitor hemoglobin, transfuse for hgb <7  -1 unit PRBCs was transfused 5/27/21 for Hgb 6.4  -continue PPI     5.  DM2  -continue basal/bolus/ISS     6.  Essential hypertension  -Initially with hypotension due to sepsis, resolved with fluid resuscitation and Levophed  -BP increasing, will wean from IV Solu-Cortef     7.  Dementia  -resume home medications     8.  Hypokalemia  -replete and monitor     9.  Leukocytosis  -secondary to UTI/sepsis  -trending downward; 28.6 -> 9.9 -> 16.8 -> 12.8, follow daily  -continue to treat infection-now on omnicef  - PE/Atelectasis contributing - PT/OT and get out of bed    10. Back pain  - lumbar and thoracic x-rays  With degenerative changes. - pain management has been difficult, she was getting Fentanyl IV frequently since CCU and hyper-somnolent this AM  - Add heating pad, lidoderm, schedule ES Tylenol and use PRN Oxycodone.  - PT/OT and ambulate as able    11. Acute hypoxemic respiratory failure  - multifactorial, at this time most likely related to volume overload related to fluid resuscitation required in setting of septic shock present on admission.   - ABG this AM without hypercarbia. Continuing supplemental oxygen and will reduce Ativan and DC Fentanyl  - Lasix 40 mg IV BID  - she has basilar infiltrates on imaging, has been on antibiotics. NOTE: This report was transcribed using voice recognition software. Every effort was made to ensure accuracy; however, inadvertent computerized transcription errors may be present.   Electronically signed by PEDRO Patricia CNP on 6/1/2021 at 8:33 AM

## 2021-06-01 NOTE — PROGRESS NOTES
Physical Therapy    Facility/Department: [address] INTERMEDIATE 1      NAME: Sravani Apodaca  : 1945  MRN: [id number]    Date of Service: 2021    Attempted to see pt for PT evaluation. Hold per nursing.    Sherice Bell PT [id number]

## 2021-06-01 NOTE — CARE COORDINATION
CASE MANAGEMENT. Olympia Colder Olymp[place] Colder Chart reviewed. Should patient be stable for discharge to [facility], she has criteria. Left vm with Dr Goran Resendez. Will follow.

## 2021-06-01 NOTE — CARE COORDINATION
CASE MANAGEMENT. .. Called and spoke with patients son, Kamari Mendez [phone], to discuss discharge plans. We discussed pros/cons of [facility] vs LTAC. He is undecided if he is interested in an LTAC at this time, but will consider if it is necessary. In the meantime, should he agree on an Janece Anurag from [employer] is following. Will cont to follow along and assist with needs accordingly.

## 2021-06-01 NOTE — PROGRESS NOTES
[address] [place] [address] [facility]  Progress Note    SUBJECTIVE:  Chief Complaint   Patient presents with    Blood Infection     sent in for sepsis rule out     No new complaints today. No fever. Tolerating antibiotics    Review of systems:  As stated above in the chief complaint, otherwise negative. Medications:  Scheduled Meds:   furosemide  40 mg Intravenous BID    acetaminophen  1,000 mg Oral Q8H    lidocaine  1 patch Transdermal Daily    [START ON 2021] hydrocortisone sodium succinate PF  50 mg Intravenous Daily    enoxaparin  1 mg/kg Subcutaneous Q12H    potassium chloride  40 mEq Oral Once    escitalopram  10 mg Oral Daily    donepezil  5 mg Oral Nightly    cefdinir  300 mg Oral 2 times per day    mupirocin   Topical BID    pantoprazole  40 mg Intravenous BID    And    sodium chloride (PF)  10 mL Intravenous BID    Arformoterol Tartrate  15 mcg Nebulization BID    budesonide  0.5 mg Nebulization BID    ipratropium-albuterol  1 ampule Inhalation Q4H WA    insulin lispro  0-12 Units Subcutaneous TID WC    insulin lispro  0-6 Units Subcutaneous Nightly    sodium chloride  250 mL Intravenous Once     Continuous Infusions:   sodium chloride      sodium chloride      dextrose       PRN Meds:oxyCODONE, sodium chloride, melatonin, hydrOXYzine, LORazepam, sodium chloride flush, sodium chloride, glucose, dextrose, glucagon (rDNA), dextrose, perflutren lipid microspheres    OBJECTIVE:  /69   Pulse 104   Temp 97.5 °F (36.4 °C) (Oral)   Resp 22   Ht 5' (1.524 m)   Wt 185 lb 2 oz (84 kg)   SpO2 96%   BMI 36.15 kg/m²   Temp  Av.5 °F (36.4 °C)  Min: 97.4 °F (36.3 °C)  Max: 97.7 °F (36.5 °C)  Constitutional: The patient is lying in bed. She is asleep but arousable. Goes back to sleep. Skin: Warm and dry. No rashes were noted. HEENT: Round and reactive pupils. Moist mucous membranes. No ulcerations or thrush. Neck: Supple to movements.    Chest: No PM  6/1/2021

## 2021-06-01 NOTE — PROGRESS NOTES
Occupational Therapy  Date:6/1/2021  Patient Name: Zulma Hernandez  Room: 4350/1036-Y     Occupational Therapy (OT) order received, patient's medical record reviewed, and OT evaluation attempted this morning; OT evaluation held, per nursing. OT evaluation to be re-attempted at later date, as able/appropriate. Laura Lopez Part, OTR/L  License Number: [id number]

## 2021-06-01 NOTE — PROGRESS NOTES
PROGRESS NOTE    Patient Presents with/Seen in Consultation For      *hemoccult +, anemia  CHIEF COMPLAINT:  Blood infection, sent in to r/o sepsis    Subjective:     Patient lethargic, difficult to keep aroused. When awakened with verbal and tactile stim, pt oriented. Speech garbled. PCP paged per charge RN. BS RN at [facility]. Pt reportedly crying and yelling earlier in back pain, had X-Rays done. She had Ativan and Fentanyl earlier. Pt resp shallow, color poor on high flow O2. SpO2 93%. BP stable. Review of Systems  Aside from what was mentioned in the PMH and HPI, essentially unremarkable, all others negative. Objective:     /69   Pulse 104   Temp 97.5 °F (36.4 °C) (Oral)   Resp 22   Ht 5' (1.524 m)   Wt 185 lb 2 oz (84 kg)   SpO2 96%   BMI 36.15 kg/m²     General appearance: lethargic, difficult to keep aroused, pale, fatigued and ill-appearing, laying in bed with family at bedside, and cooperative  Eyes: conjunctiva pale, sclera anicteric. PERRL. Lungs:  High flow O2 per nasal cannula, shallow respirations, RR 14 bpm, diminished throughout to auscultation bilaterally  Heart: regular rate and rhythm, no murmur, 2+ pulses; trace BLE edema  Abdomen: soft, non-tender; bowel sounds normal; no masses,  no organomegaly  Extremities: extremities with trace BLE edema  Pulses: 2+ and symmetric  Skin: Skin color pale, texture, turgor normal.   Neurologic:  lethargic, difficult to keep aroused. When awakened with verbal and tactile stim, pt oriented x 3. Speech garbled.     furosemide (LASIX) injection 40 mg, BID  acetaminophen (TYLENOL) tablet 1,000 mg, Q8H  oxyCODONE (ROXICODONE) immediate release tablet 5 mg, Q6H PRN  potassium chloride (KLOR-CON M) extended release tablet 40 mEq, Once  hydrocortisone sodium succinate PF (SOLU-CORTEF) injection 50 mg, BID  cefdinir (OMNICEF) capsule 300 mg, 2 times per day  0.9 % sodium chloride infusion, PRN  mupirocin (BACTROBAN) 2 % ointment, BID  melatonin tablet 3 mg, Nightly PRN  hydrOXYzine (VISTARIL) capsule 25 mg, TID PRN  LORazepam (ATIVAN) injection 0.5 mg, Q6H PRN  pantoprazole (PROTONIX) injection 40 mg, BID   And  sodium chloride (PF) 0.9 % injection 10 mL, BID  Arformoterol Tartrate (BROVANA) nebulizer solution 15 mcg, BID  budesonide (PULMICORT) nebulizer suspension 500 mcg, BID  ipratropium-albuterol (DUONEB) nebulizer solution 1 ampule, Q4H WA  insulin lispro (HUMALOG) injection vial 0-12 Units, TID WC  insulin lispro (HUMALOG) injection vial 0-6 Units, Nightly  0.9 % sodium chloride bolus, Once  sodium chloride flush 0.9 % injection 5-40 mL, PRN  0.9 % sodium chloride infusion, PRN  enoxaparin (LOVENOX) injection 70 mg, Q12H  glucose (GLUTOSE) 40 % oral gel 15 g, PRN  dextrose 50 % IV solution, PRN  glucagon (rDNA) injection 1 mg, PRN  dextrose 5 % solution, PRN  perflutren lipid microspheres (DEFINITY) injection 1.65 mg, ONCE PRN         Data Review  CBC:   Lab Results   Component Value Date    WBC 12.1 06/01/2021    RBC 3.75 06/01/2021    HGB 9.1 06/01/2021    HCT 30.1 06/01/2021    MCV 80.3 06/01/2021    MCH 24.3 06/01/2021    MCHC 30.2 06/01/2021    RDW 18.1 06/01/2021     06/01/2021    MPV 11.2 06/01/2021     CMP:    Lab Results   Component Value Date     06/01/2021    K 3.1 06/01/2021     06/01/2021    CO2 28 06/01/2021    BUN 16 06/01/2021    CREATININE 0.9 06/01/2021    GFRAA >60 06/01/2021    LABGLOM >60 06/01/2021    GLUCOSE 119 06/01/2021    PROT 6.1 06/01/2021    LABALBU 3.2 06/01/2021    CALCIUM 8.5 06/01/2021    BILITOT 0.3 06/01/2021    ALKPHOS 77 06/01/2021    AST 15 06/01/2021    ALT 10 06/01/2021     Hepatic Function Panel:    Lab Results   Component Value Date    ALKPHOS 77 06/01/2021    ALT 10 06/01/2021    AST 15 06/01/2021    PROT 6.1 06/01/2021    BILITOT 0.3 06/01/2021    LABALBU 3.2 06/01/2021     IRON:    Lab Results   Component Value Date    IRON 8 05/26/2021     Iron Saturation:  No components found for: PERCENTFE  FERRITIN:    Lab Results   Component Value Date    FERRITIN 81 05/26/2021         Assessment:     Active Problems:  ? Anemia, microcytic, iron deficiency hemoccult +  ? Septic shock, Klebsiella bacteremia, off pressors- ID following  ? Leukocytosis - ID following  ? Fever -resolved  ? Lactic acidosis -resolved  ? Acute respiratory failure  ? Bilateral PE - defer to PCP  ? Dementia  ? Hx COVID  ? EGD with bx 5/28/21 - Grade B LA classification GERD, small hiatal hernia. Stomach showed gastritis, biopsy negative for H. Pylori infection. Duodenum showed duodenitis with small AVM, biopsy pending to rule out sprue.  No active bleeding seen. Plan:     ? Serial H&H, transfuse per PCP  ? Continue Protonix 40 MG BID  ? Antibiotics per ID  ? Monitor CBC, CMP daily  ? Colonoscopy to be done outpatient - Hgb currently stable, plan for outpatient as pt does not want currently and is requiring high flow O2 -discussed with patient and family, they agree  ? Supportive care  ? Continue to monitor    Note: This report was completed utilizing computer voice recognition software. Every effort has been made to ensure accuracy, however; inadvertent computerized transcription errors may be present.      Discussed with Dr. Darleen Young per Dr. Toshia Torre [id number], FNP-BC 6/1/2021 11:05 AM For Dr. Leroy Dockery

## 2021-06-01 NOTE — PROGRESS NOTES
Patient refusing to wear Bipap. Patient educated multiple times on importance of wearing bipap. Patient still refusing. Will continue to monitor and attempt again.

## 2021-06-01 NOTE — PROGRESS NOTES
Date: 6/1/2021    Time: 2:17 PM    Patient Placed On BIPAP/CPAP/ Non-Invasive Ventilation? Yes    If no must comment. Facial area red/color change? No           If YES are Blister/Lesion present? No   If yes must notify nursing staff  BIPAP/CPAP skin barrier?   Yes    Skin barrier type:mepilex       Comments:        Rufus Fried RCP

## 2021-06-01 NOTE — PROGRESS NOTES
at admit wt)       Nutrition Diagnosis:   · Inadequate oral intake related to altered GI function as evidenced by intake 0-25%, GI abnormality      Nutrition Interventions:   Food and/or Nutrient Delivery:  Continue Current Diet, Continue Oral Nutrition Supplement  Nutrition Education/Counseling:  No recommendation at this time   Coordination of Nutrition Care:  Continue to monitor while inpatient    Goals:  PO at least 50% at meals/ONS       Nutrition Monitoring and Evaluation:   Behavioral-Environmental Outcomes:  None Identified   Food/Nutrient Intake Outcomes:  Food and Nutrient Intake, Supplement Intake  Physical Signs/Symptoms Outcomes:  Biochemical Data, GI Status, Fluid Status or Edema, Nutrition Focused Physical Findings, Skin, Weight     Discharge Planning:     Too soon to determine     Electronically signed by Kiley Zabala RD, CNSC, LD on 6/1/21 at 5:05 PM EDT    Contact: [phone]

## 2021-06-02 ENCOUNTER — APPOINTMENT (OUTPATIENT)
Dept: CT IMAGING | Age: 76
DRG: 871 | End: 2021-06-02
Payer: MEDICARE

## 2021-06-02 ENCOUNTER — APPOINTMENT (OUTPATIENT)
Dept: GENERAL RADIOLOGY | Age: 76
DRG: 871 | End: 2021-06-02
Payer: MEDICARE

## 2021-06-02 LAB
ALBUMIN SERPL-MCNC: 2.9 G/DL (ref 3.5–5.2)
ALP BLD-CCNC: 78 U/L (ref 35–104)
ALT SERPL-CCNC: 10 U/L (ref 0–32)
ANION GAP SERPL CALCULATED.3IONS-SCNC: 10 MMOL/L (ref 7–16)
AST SERPL-CCNC: 14 U/L (ref 0–31)
BASOPHILS ABSOLUTE: 0.01 E9/L (ref 0–0.2)
BASOPHILS RELATIVE PERCENT: 0.1 % (ref 0–2)
BILIRUB SERPL-MCNC: 0.4 MG/DL (ref 0–1.2)
BUN BLDV-MCNC: 15 MG/DL (ref 6–23)
CALCIUM SERPL-MCNC: 8.2 MG/DL (ref 8.6–10.2)
CHLORIDE BLD-SCNC: 101 MMOL/L (ref 98–107)
CO2: 31 MMOL/L (ref 22–29)
CREAT SERPL-MCNC: 0.9 MG/DL (ref 0.5–1)
EOSINOPHILS ABSOLUTE: 0.02 E9/L (ref 0.05–0.5)
EOSINOPHILS RELATIVE PERCENT: 0.2 % (ref 0–6)
GFR AFRICAN AMERICAN: >60
GFR NON-AFRICAN AMERICAN: >60 ML/MIN/1.73
GLUCOSE BLD-MCNC: 110 MG/DL (ref 74–99)
HCT VFR BLD CALC: 29.4 % (ref 34–48)
HEMOGLOBIN: 8.8 G/DL (ref 11.5–15.5)
IMMATURE GRANULOCYTES #: 0.24 E9/L
IMMATURE GRANULOCYTES %: 2.2 % (ref 0–5)
LYMPHOCYTES ABSOLUTE: 1.22 E9/L (ref 1.5–4)
LYMPHOCYTES RELATIVE PERCENT: 11.3 % (ref 20–42)
MAGNESIUM: 1.9 MG/DL (ref 1.6–2.6)
MCH RBC QN AUTO: 23.7 PG (ref 26–35)
MCHC RBC AUTO-ENTMCNC: 29.9 % (ref 32–34.5)
MCV RBC AUTO: 79 FL (ref 80–99.9)
METER GLUCOSE: 121 MG/DL (ref 74–99)
METER GLUCOSE: 152 MG/DL (ref 74–99)
METER GLUCOSE: 156 MG/DL (ref 74–99)
METER GLUCOSE: 86 MG/DL (ref 74–99)
MONOCYTES ABSOLUTE: 0.57 E9/L (ref 0.1–0.95)
MONOCYTES RELATIVE PERCENT: 5.3 % (ref 2–12)
NEUTROPHILS ABSOLUTE: 8.74 E9/L (ref 1.8–7.3)
NEUTROPHILS RELATIVE PERCENT: 80.9 % (ref 43–80)
PDW BLD-RTO: 18.4 FL (ref 11.5–15)
PLATELET # BLD: 303 E9/L (ref 130–450)
PMV BLD AUTO: 10.1 FL (ref 7–12)
POTASSIUM REFLEX MAGNESIUM: 3.4 MMOL/L (ref 3.5–5)
RBC # BLD: 3.72 E12/L (ref 3.5–5.5)
SODIUM BLD-SCNC: 142 MMOL/L (ref 132–146)
TOTAL PROTEIN: 5.9 G/DL (ref 6.4–8.3)
WBC # BLD: 10.8 E9/L (ref 4.5–11.5)

## 2021-06-02 PROCEDURE — 6370000000 HC RX 637 (ALT 250 FOR IP): Performed by: NURSE PRACTITIONER

## 2021-06-02 PROCEDURE — 6370000000 HC RX 637 (ALT 250 FOR IP): Performed by: INTERNAL MEDICINE

## 2021-06-02 PROCEDURE — 2700000000 HC OXYGEN THERAPY PER DAY

## 2021-06-02 PROCEDURE — 94660 CPAP INITIATION&MGMT: CPT

## 2021-06-02 PROCEDURE — 36415 COLL VENOUS BLD VENIPUNCTURE: CPT

## 2021-06-02 PROCEDURE — 6370000000 HC RX 637 (ALT 250 FOR IP): Performed by: FAMILY MEDICINE

## 2021-06-02 PROCEDURE — 97165 OT EVAL LOW COMPLEX 30 MIN: CPT

## 2021-06-02 PROCEDURE — 6360000002 HC RX W HCPCS: Performed by: FAMILY MEDICINE

## 2021-06-02 PROCEDURE — 2580000003 HC RX 258: Performed by: INTERNAL MEDICINE

## 2021-06-02 PROCEDURE — 99232 SBSQ HOSP IP/OBS MODERATE 35: CPT | Performed by: FAMILY MEDICINE

## 2021-06-02 PROCEDURE — 6370000000 HC RX 637 (ALT 250 FOR IP): Performed by: SPECIALIST

## 2021-06-02 PROCEDURE — 71045 X-RAY EXAM CHEST 1 VIEW: CPT

## 2021-06-02 PROCEDURE — 71275 CT ANGIOGRAPHY CHEST: CPT

## 2021-06-02 PROCEDURE — 85025 COMPLETE CBC W/AUTO DIFF WBC: CPT

## 2021-06-02 PROCEDURE — APPSS30 APP SPLIT SHARED TIME 16-30 MINUTES: Performed by: NURSE PRACTITIONER

## 2021-06-02 PROCEDURE — 80053 COMPREHEN METABOLIC PANEL: CPT

## 2021-06-02 PROCEDURE — 6360000002 HC RX W HCPCS: Performed by: NURSE PRACTITIONER

## 2021-06-02 PROCEDURE — 6360000002 HC RX W HCPCS: Performed by: INTERNAL MEDICINE

## 2021-06-02 PROCEDURE — C9113 INJ PANTOPRAZOLE SODIUM, VIA: HCPCS | Performed by: INTERNAL MEDICINE

## 2021-06-02 PROCEDURE — 97161 PT EVAL LOW COMPLEX 20 MIN: CPT

## 2021-06-02 PROCEDURE — 82962 GLUCOSE BLOOD TEST: CPT

## 2021-06-02 PROCEDURE — 2060000000 HC ICU INTERMEDIATE R&B

## 2021-06-02 PROCEDURE — 83735 ASSAY OF MAGNESIUM: CPT

## 2021-06-02 PROCEDURE — 6360000004 HC RX CONTRAST MEDICATION: Performed by: RADIOLOGY

## 2021-06-02 RX ORDER — SODIUM CHLORIDE 0.9 % (FLUSH) 0.9 %
10 SYRINGE (ML) INJECTION PRN
Status: COMPLETED | OUTPATIENT
Start: 2021-06-02 | End: 2021-06-03

## 2021-06-02 RX ADMIN — INSULIN LISPRO 2 UNITS: 100 INJECTION, SOLUTION INTRAVENOUS; SUBCUTANEOUS at 16:53

## 2021-06-02 RX ADMIN — FUROSEMIDE 40 MG: 10 INJECTION, SOLUTION INTRAMUSCULAR; INTRAVENOUS at 19:22

## 2021-06-02 RX ADMIN — MUPIROCIN: 20 OINTMENT TOPICAL at 07:54

## 2021-06-02 RX ADMIN — POTASSIUM CHLORIDE 20 MEQ: 1500 TABLET, EXTENDED RELEASE ORAL at 13:27

## 2021-06-02 RX ADMIN — POTASSIUM CHLORIDE 40 MEQ: 1500 TABLET, EXTENDED RELEASE ORAL at 07:53

## 2021-06-02 RX ADMIN — ESCITALOPRAM OXALATE 20 MG: 10 TABLET ORAL at 07:53

## 2021-06-02 RX ADMIN — PANTOPRAZOLE SODIUM 40 MG: 40 INJECTION, POWDER, FOR SOLUTION INTRAVENOUS at 07:53

## 2021-06-02 RX ADMIN — SODIUM CHLORIDE, PRESERVATIVE FREE 10 ML: 5 INJECTION INTRAVENOUS at 07:54

## 2021-06-02 RX ADMIN — IOPAMIDOL 75 ML: 755 INJECTION, SOLUTION INTRAVENOUS at 20:34

## 2021-06-02 RX ADMIN — PANTOPRAZOLE SODIUM 40 MG: 40 INJECTION, POWDER, FOR SOLUTION INTRAVENOUS at 21:06

## 2021-06-02 RX ADMIN — SODIUM CHLORIDE, PRESERVATIVE FREE 10 ML: 5 INJECTION INTRAVENOUS at 21:07

## 2021-06-02 RX ADMIN — ACETAMINOPHEN 1000 MG: 500 TABLET ORAL at 21:06

## 2021-06-02 RX ADMIN — CEFDINIR 300 MG: 300 CAPSULE ORAL at 09:19

## 2021-06-02 RX ADMIN — ACETAMINOPHEN 1000 MG: 500 TABLET ORAL at 13:27

## 2021-06-02 RX ADMIN — POTASSIUM CHLORIDE 40 MEQ: 1500 TABLET, EXTENDED RELEASE ORAL at 16:52

## 2021-06-02 RX ADMIN — CEFDINIR 300 MG: 300 CAPSULE ORAL at 21:06

## 2021-06-02 RX ADMIN — ENOXAPARIN SODIUM 80 MG: 80 INJECTION SUBCUTANEOUS at 07:54

## 2021-06-02 RX ADMIN — HYDROCORTISONE SODIUM SUCCINATE 50 MG: 100 INJECTION, POWDER, FOR SOLUTION INTRAMUSCULAR; INTRAVENOUS at 07:55

## 2021-06-02 RX ADMIN — FUROSEMIDE 40 MG: 10 INJECTION, SOLUTION INTRAMUSCULAR; INTRAVENOUS at 07:53

## 2021-06-02 RX ADMIN — MUPIROCIN: 20 OINTMENT TOPICAL at 21:50

## 2021-06-02 RX ADMIN — DONEPEZIL HYDROCHLORIDE 5 MG: 5 TABLET, FILM COATED ORAL at 21:06

## 2021-06-02 RX ADMIN — ENOXAPARIN SODIUM 80 MG: 80 INJECTION SUBCUTANEOUS at 21:06

## 2021-06-02 ASSESSMENT — PAIN DESCRIPTION - PROGRESSION: CLINICAL_PROGRESSION: GRADUALLY WORSENING

## 2021-06-02 ASSESSMENT — PAIN DESCRIPTION - PAIN TYPE: TYPE: ACUTE PAIN

## 2021-06-02 ASSESSMENT — PAIN DESCRIPTION - ONSET: ONSET: ON-GOING

## 2021-06-02 ASSESSMENT — PAIN DESCRIPTION - FREQUENCY: FREQUENCY: CONTINUOUS

## 2021-06-02 ASSESSMENT — PAIN DESCRIPTION - DESCRIPTORS: DESCRIPTORS: ACHING;CONSTANT;DISCOMFORT

## 2021-06-02 ASSESSMENT — PAIN DESCRIPTION - ORIENTATION: ORIENTATION: LOWER

## 2021-06-02 ASSESSMENT — PAIN SCALES - GENERAL
PAINLEVEL_OUTOF10: 0
PAINLEVEL_OUTOF10: 5
PAINLEVEL_OUTOF10: 0
PAINLEVEL_OUTOF10: 7
PAINLEVEL_OUTOF10: 7

## 2021-06-02 ASSESSMENT — PAIN DESCRIPTION - LOCATION: LOCATION: BACK

## 2021-06-02 ASSESSMENT — PAIN - FUNCTIONAL ASSESSMENT: PAIN_FUNCTIONAL_ASSESSMENT: PREVENTS OR INTERFERES SOME ACTIVE ACTIVITIES AND ADLS

## 2021-06-02 NOTE — PROGRESS NOTES
Radha Gutierres (granddaughter)  called and requested to talk to Doctor Prudence Boeck she can be reached at [phone]

## 2021-06-02 NOTE — PROGRESS NOTES
Physical Therapy    Facility/Department: [address] INTERMEDIATE 1  Initial Assessment    NAME: Felicitas Zaldivar  : 1945  MRN: [id number]    Date of Service: 2021       REQUIRES PT FOLLOW UP: Yes       Patient Diagnosis(es): The primary encounter diagnosis was Septic shock ([address] Diagnoses of Acute pulmonary embolism, unspecified pulmonary embolism type, unspecified whether acute cor pulmonale present ([address] Acute cystitis with hematuria, Acute respiratory failure with hypoxia ([address] and Abnormal CT scan were also pertinent to this visit. has a past medical history of Anemia, Angina pectoris ([address] Anxiety, COPD (chronic obstructive pulmonary disease) ([address] COVID-19, Dementia ([address] Diabetes mellitus ([address] Hypertension, Insomnia, Muscle weakness, SONIYA (obstructive sleep apnea), Pneumonia due to COVID-19 virus, Respiratory failure ([address] and TIA (transient ischemic attack). has a past surgical history that includes joint replacement; Tonsillectomy; joint replacement (Bilateral); joint replacement (Bilateral); Hysterectomy; and Upper gastrointestinal endoscopy (N/A, 2021). Evaluating Therapist: Adrian John PT     Referring Provider:   Dr. Lonie Leventhal #:  [id number]   DIAGNOSIS:  Septic shock , B PEs   PRECAUTIONS: falls, O2@ 14 L via high flow     Social:  Pt lives with  Family  in a  1  floor plan . Pt admitted from rehab. Per chart, was there due to recent fall   Prior to admission pt walked with  ww      Initial Evaluation  Date: 2021  Treatment      Short Term/ Long Term   Goals   Was pt agreeable to Eval/treatment?  with encouragement      Does pt have pain?  no pain; reports tired      Bed Mobility  Rolling:   Mod assist   Supine to sit:  Max assist to long sit in bed   Sit to supine: mod assist   Scooting:  Max assist x 2 in supine   min assist    Transfers Sit to stand:  NT   Stand to sit: NT   Stand pivot:  NT    min assist    Ambulation   NT    25  feet with  ww with min assist

## 2021-06-02 NOTE — CARE COORDINATION
[facility] able to accept at any time. NO precert will be required prior to discharge.   CTA chest pending

## 2021-06-02 NOTE — PLAN OF CARE
Problem: Skin Integrity:  Goal: Will show no infection signs and symptoms  Description: Will show no infection signs and symptoms  Outcome: Met This Shift     Problem: Skin Integrity:  Goal: Absence of new skin breakdown  Description: Absence of new skin breakdown  Outcome: Met This Shift     Problem: Falls - Risk of:  Goal: Will remain free from falls  Description: Will remain free from falls  Outcome: Met This Shift     Problem: Falls - Risk of:  Goal: Absence of physical injury  Description: Absence of physical injury  Outcome: Met This Shift     Problem: ABCDS Injury Assessment  Goal: Absence of physical injury  Outcome: Met This Shift     Problem: Cardiac Output - Decreased:  Goal: Hemodynamic stability will improve  Description: Hemodynamic stability will improve  Outcome: Met This Shift     Problem: Gas Exchange - Impaired:  Goal: Levels of oxygenation will improve  Description: Levels of oxygenation will improve  Outcome: Met This Shift     Problem: Anxiety:  Goal: Level of anxiety will decrease  Description: Level of anxiety will decrease  Outcome: Met This Shift     Problem: Pain:  Goal: Pain level will decrease  Description: Pain level will decrease  Outcome: Met This Shift     Problem: Pain:  Goal: Control of acute pain  Description: Control of acute pain  Outcome: Met This Shift     Problem: Pain:  Goal: Control of chronic pain  Description: Control of chronic pain  Outcome: Met This Shift

## 2021-06-02 NOTE — PROGRESS NOTES
[facility] Progress Note    Admitting Date and Time: 5/25/2021  2:10 PM  Admit Dx: Septic shock ([facility] [A41.9, R65.21]    Subjective:  Patient is being followed for Septic shock ([facility] [A41.9, R65.21]     Yesterday worsening respiratory status and hypersomnolent. PaO2 67 per ABG and she was treated with BiPAP therapy for several hours. Fentanyl and Ativan discontinued    This morning she is wide awake and pleasant/interactive. Complains of pain to right lower chest wall, achy/throbbing. She tolerated Jell-O and liquids this morning    Remains on 14 l/m HFNC, saturations 92%    ROS: denies fever, chills, cp, sob, n/v, HA unless stated above.       furosemide  40 mg Intravenous BID    acetaminophen  1,000 mg Oral Q8H    lidocaine  1 patch Transdermal Daily    hydrocortisone sodium succinate PF  50 mg Intravenous Daily    enoxaparin  1 mg/kg Subcutaneous Q12H    donepezil  5 mg Oral Nightly    escitalopram  20 mg Oral Daily    potassium chloride  40 mEq Oral TID WC    cefdinir  300 mg Oral 2 times per day    mupirocin   Topical BID    pantoprazole  40 mg Intravenous BID    And    sodium chloride (PF)  10 mL Intravenous BID    Arformoterol Tartrate  15 mcg Nebulization BID    budesonide  0.5 mg Nebulization BID    ipratropium-albuterol  1 ampule Inhalation Q4H WA    insulin lispro  0-12 Units Subcutaneous TID WC    insulin lispro  0-6 Units Subcutaneous Nightly    sodium chloride  250 mL Intravenous Once     oxyCODONE, 5 mg, Q6H PRN  sodium chloride, , PRN  melatonin, 3 mg, Nightly PRN  hydrOXYzine, 25 mg, TID PRN  LORazepam, 0.5 mg, Q6H PRN  sodium chloride flush, 5-40 mL, PRN  sodium chloride, 25 mL, PRN  glucose, 15 g, PRN  dextrose, 12.5 g, PRN  glucagon (rDNA), 1 mg, PRN  dextrose, 100 mL/hr, PRN  perflutren lipid microspheres, 1.5 mL, ONCE PRN         Objective:    /61   Pulse 82   Temp 97.6 °F (36.4 °C) (Oral)   Resp 16   Ht 5' (1.524 m)   Wt 185 lb 4.8 oz (84.1 kg)   SpO2 91%   BMI 36.19 kg/m²     General Appearance: awake and alert, respirations unlabored but remains on HFNC  Skin: warm and dry, slightly pale  Head: normocephalic and atraumatic  Eyes: pupils equal, round, and reactive to light, extraocular eye movements intact, conjunctivae normal  Neck: neck supple and non tender without mass   Pulmonary/Chest: she has bibasilar crackles   Cardiovascular: normal rate, normal S1 and S2 and no carotid bruits  Abdomen: soft, non-tender, non-distended, normal bowel sounds, no masses or organomegaly  Extremities: no cyanosis, no clubbing, 2+ edema BLE  Neurologic: no cranial nerve deficit and speech normal  She is generally weak but symmetric, unable to maintain lower extremities from bed at 5 seconds. Sensation intact        Recent Labs     06/01/21  0338 06/01/21  1751 06/02/21  0251    144 142   K 3.1* 3.1* 3.4*    103 101   CO2 28 31* 31*   BUN 16 16 15   CREATININE 0.9 1.0 0.9   GLUCOSE 119* 116* 110*   CALCIUM 8.5* 8.2* 8.2*       Recent Labs     05/31/21  0301 06/01/21  0338 06/02/21  0251   WBC 16.8* 12.1* 10.8   RBC 4.27 3.75 3.72   HGB 10.0* 9.1* 8.8*   HCT 34.8 30.1* 29.4*   MCV 81.5 80.3 79.0*   MCH 23.4* 24.3* 23.7*   MCHC 28.7* 30.2* 29.9*   RDW 18.0* 18.1* 18.4*    350 303   MPV 10.6 11.2 10.1         Assessment:    Active Problems:    Septic shock (HCC)    Complicated UTI (urinary tract infection)  Resolved Problems:    * No resolved hospital problems.  *      Plan:  1.  Sepsis due to complicated UTI, as well as Klebsiella and Enterobacteriaceae bacteremia   - Sepsis resolved, weaned from Levophed and off pressors -> transferred out of CCU 5/29.  - BP stable and Solu-Cortef taper continues by 50% daily, conclude tomorrow with 25 mg dose  -thus far has received cefepime, ertapenem, vancomycin, rocephin  -WBC trend down 10.8  -ceftriaxone now changed to [id number], hypoperfusion related to sepsis and hypotension  -continue to monitor GFR and creatinine  -Bicarb improved, bicarb drip discontinued  -Off IV fluids and now hypervolemic, receiving lasix for diuresis --> Renal function stable, HCO3 increasing possibly related to contraction alkalosis. Follow BMP in AM    3.  PE, bialteral-stable  -needed 5L of supplemental O2 via NC on admission,   - Increased O2 requirements felt due to volume overload, and has been started on IV Lasix BID  - Lovenox was on hold in setting of acute anemia and GI bleeding. Resumed on 5/31  - She had worsening acute hypoxemia 6/1 requiring BiPAP therapy. Will repeat CTA chest today to reassess clot burden.     4.  Anemia-stable  -hemoccult positive  -GI consulted  -EGD performed 5/28/2021 revealing gastritis and duodenitis.  Biopsies obtained.  No active bleeding.  -continue to monitor hemoglobin, transfuse for hgb <7  -1 unit PRBCs was transfused 5/27/21 for Hgb 6.4  -continue PPI  - HgB trending down 10 -> 9.1 -> 8.8 since Lovenox resumed. No observed melena/hematoichezia     5.  DM2  -continue basal/bolus/ISS     6.  Essential hypertension  -Initially with hypotension due to sepsis, resolved with fluid resuscitation and Levophed  -BP increasing, will wean from IV Solu-Cortef 50% daily and taper concludes 6/3     7.  Dementia  -resume home medications     8.  Hypokalemia  - is ON KCL supplementation  - follow daily while on IV Lasix     9.  Leukocytosis  -secondary to UTI, bacteremia, sepsis  -trending downward; 28.6 -> 9.9 -> 16.8 -> 12.8 -> 10.8, follow daily  -continue to treat infection-now on omnicef  - PE/Atelectasis contributing - PT/OT and get out of bed     10. Back pain  - lumbar and thoracic x-rays  With degenerative changes. - pain management has been difficult, she was getting Fentanyl IV frequently since CCU and hyper-somnolent AM 6/1  - Fentanyl and scheduled Ativan DC'd.  She is more awake and alert now  - Add heating pad, lidoderm, schedule ES Tylenol and use PRN Oxycodone.  - PT/OT and ambulate as able  - She complains only of right lower rib/chest wall pain today     11. Acute hypoxemic respiratory failure  - multifactorial, at this time most likely related to volume overload related to fluid resuscitation required in setting of septic shock present on admission.   - ABG 6/1 without hypercarbia but PaO2 only 67 on HFNC. - Several hours of BiPAP, she is more alert and less subjectively SOB today  - Lasix 40 mg IV BID  - she has basilar infiltrates on imaging, has been on antibiotics.          NOTE: This report was transcribed using voice recognition software. Every effort was made to ensure accuracy; however, inadvertent computerized transcription errors may be present.   Electronically signed by PEDRO Gonsales CNP on 6/2/2021 at 8:43 AM

## 2021-06-02 NOTE — PROGRESS NOTES
Occupational Therapy  OCCUPATIONAL THERAPY INITIAL EVALUATION  [address] [address]    Date: 2021     Patient Name: Jade Santos  MRN: [id number]  : 1945  Room: [facility]0983    Evaluating OT: Carter Hinds, OTR/L - [id number]    Referring Provider: Darling Cunha MD   Specific Provider Orders/Date: \"OT eval and treat\" - 2021    Diagnosis: Septic shock ([facility] [address] [A41.9, R65.21]      Pertinent Medical History: COPD, anxiety, HTN, insomnia, TIA, DM, COVID-19    Precautions: fall risk, 14L O2 via high-flow nasal cannula, skin integrity, bed alarm, wilkins catheter    Assessment of Current Deficits:    [x] Functional mobility   [x]ADLs  [x] Strength               [x]Cognition   [x] Functional transfers   [x] IADLs         [x] Safety Awareness   [x]Endurance   [] Fine Coordination              [x] Balance      [] Vision/perception   [x]Sensation    []Gross Motor Coordination  [] ROM  [] Delirium                   [] Motor Control     OT PLAN OF CARE   OT POC is based on physician orders, patient diagnosis, and results of clinical assessment.   Frequency/Duration 2-5 days/week for 2 weeks PRN   Specific OT Treatment Interventions to Include:   * Instruction/training on adapted ADL techniques and AE recommendations to increase functional independence within        precautions  * Training on energy conservation strategies, correct breathing pattern and techniques to improve independence/tolerance for self-care routine  * Functional transfer/mobility training/DME recommendations for increased independence, safety, and fall prevention  * Patient/Family education to increase follow through with safety techniques and functional independence  * Recommendation of environmental modifications for increased safety with functional transfers/mobility and ADLs  * Cognitive retraining/development of therapeutic activities to improve problem solving, judgement, memory, and attention for increased safety/participation in ADL/IADL tasks  * Therapeutic exercise to improve motor endurance, ROM, and functional strength for ADLs/functional transfers  * Therapeutic activities to facilitate/challenge dynamic balance, stand tolerance for increased safety and independence with ADLs  * Neuro-muscular re-education: facilitation of righting/equilibrium reactions, midline orientation, scapular stability/mobility, normalization of muscle tone, and facilitation of volitional active controled movement  * Positioning to improve skin integrity, interaction with environment and functional independence    Recommended Adaptive Equipment: TBD    Home Living: Patient admitted from [facility]. Prior, patient reported that she lived with family in a one-floor setup. Bathroom Setup: walk-in shower (with seat and grab bar)    Prior Level of Function (PLOF): Patient reported that she was independent with ADLs, but needed assistance with IADLs prior. Patient noted that she had been using a walker for functional mobility at [facility] recently. Driving: Yes, \"a little bit\". Pain Level: Patient denied experiencing pain. Cognition: Patient alert and oriented grossly. WFL command follow demonstrated. Verbal encouragement needed to maximize willingness to participate in OT evaluation. Patient is a questionable historian; no family/caregiver present to verify information gathered. Decreased self-efficacy demonstrated.   Memory: Fair  Sequencing: Fair  Problem Solving: Fair  Judgement/Safety: Fair    Functional Assessment:  AM-PAC Daily Activity Raw Score: 11/24   Initial Eval Status  Date: 6/2/2021 Treatment Status  Date:  Short Term Goals = Long Term Goals   Feeding SBA  Setup   Grooming Mod A  Setup  (seated)   UB Dressing Mod A  Setup   LB Dressing Dependent  Mod A - with use of AE, as needed/appropriate   Bathing Max A  Mod A - with use of AE/DME, as needed/appropriate   Toileting Dependent for hygiene at bed-level following evidence of bowel incontinence. Max A   Bed Mobility  Supine-to-Long-Sitting: Max Ax2   Log Rolling: Mod A for facilitation of hygiene and change of bed linens; verbal/visual cues to maximize patient's participation. Patient refused to attempt supine-to-sit. Functional Transfers Not assessed. Mod A   Functional Mobility Not assessed. Balance Sitting: Poor+ to Fair-  (in long-sitting)  Fair+ dynamic sitting balance during completion of ADLs/IADLs and other functional tasks. Activity Tolerance Poor  Patient will demonstrate Good understanding and consistent implementation of energy conservation techniques and work simplification techniques into ADL routines. Visual/  Perceptual Fair      N/A   B UE Strength 3+/5  Patient will demonstrate 4+/5 B UE strength in order to maximize independence with ADLs and functional transfers. Additional Long-Term Goal: Patient will increase functional independence to PLOF in order to allow patient to live in least restrictive environment. ROM: Additional Information:    R UE  WFL    L UE WFL      Hearing: WFL  Sensation: Patient denied experiencing numbness/tingling in B UEs. Tone: WFL  Edema: No    Comments: RN approved patient's participation in EOB activities. Upon arrival, patient supine in bed. At end of session, patient supine in bed (per patient preference) with call light and phone within reach, bed alarm, and all lines and tubes intact. Patient would benefit from continued skilled OT to increase safety and independence with completion of ADL/IADL tasks for functional independence and quality of life. Rehab Potential: Good for established goals. Patient / Family Goal: No goal indicated. Patient and/or family were instructed on functional diagnosis, prognosis/goals, and OT plan of care. Demonstrated Fair understanding.     Eval Complexity: Low    Time In: 1118  Time Out: 1130  Total Treatment Time: 0 minutes      Minutes Units   OT Eval Low 09915 12 1   OT Eval Medium 76362     OT Eval High 52629     OT Re-Eval [id number]     Therapeutic Ex 37291     Therapeutic Activities 71352     ADL/Self Care 22946     Orthotic Management 83344     Neuro Re-Ed 77044     Non-Billable Time N/A ---     Evaluation time includes thorough review of current medical information, gathering information on past medical history/social history and prior level of function, completion of standardized testing/informal observation of tasks, assessment of data, and education on plan of care and goals. Laura Pearson, OTR/L  License Number: [id number]

## 2021-06-02 NOTE — PROGRESS NOTES
[address] [place] [address] [facility]  EVELIAIDA  Progress Note    SUBJECTIVE:  Chief Complaint   Patient presents with    Blood Infection     sent in for sepsis rule out     The patient says that she is feeling better today. Denies dyspnea. Denies pain. Review of systems:  As stated above in the chief complaint, otherwise negative. Medications:  Scheduled Meds:   [START ON 6/3/2021] hydrocortisone sodium succinate PF  25 mg Intravenous Once    furosemide  40 mg Intravenous BID    acetaminophen  1,000 mg Oral Q8H    lidocaine  1 patch Transdermal Daily    enoxaparin  1 mg/kg Subcutaneous Q12H    donepezil  5 mg Oral Nightly    escitalopram  20 mg Oral Daily    potassium chloride  40 mEq Oral TID WC    cefdinir  300 mg Oral 2 times per day    mupirocin   Topical BID    pantoprazole  40 mg Intravenous BID    And    sodium chloride (PF)  10 mL Intravenous BID    Arformoterol Tartrate  15 mcg Nebulization BID    budesonide  0.5 mg Nebulization BID    ipratropium-albuterol  1 ampule Inhalation Q4H WA    insulin lispro  0-12 Units Subcutaneous TID WC    insulin lispro  0-6 Units Subcutaneous Nightly    sodium chloride  250 mL Intravenous Once     Continuous Infusions:   sodium chloride      sodium chloride      dextrose       PRN Meds:iopamidol, sodium chloride flush, oxyCODONE, sodium chloride, melatonin, hydrOXYzine, LORazepam, sodium chloride flush, sodium chloride, glucose, dextrose, glucagon (rDNA), dextrose, perflutren lipid microspheres    OBJECTIVE:  /61   Pulse 82   Temp 97.6 °F (36.4 °C) (Oral)   Resp 16   Ht 5' (1.524 m)   Wt 185 lb 4.8 oz (84.1 kg)   SpO2 91%   BMI 36.19 kg/m²   Temp  Av.6 °F (36.4 °C)  Min: 97.5 °F (36.4 °C)  Max: 97.7 °F (36.5 °C)  Constitutional: The patient is lying in bed. She is awake and alert. No distress. Skin: Warm and dry. No rashes were noted. HEENT: Round and reactive pupils. Moist mucous membranes.   No ulcerations or thrush. Neck: Supple to movements. Chest: No respiratory distress. No crackles. Cardiovascular: Heart sounds rhythmic and regular. Abdomen: Positive bowel sounds to auscultation. Benign to palpation. Extremities: No edema. Bilateral TKR. Lines: Peripheral  Blair catheter. Laboratory and Tests Review:  Lab Results   Component Value Date    WBC 10.8 06/02/2021    WBC 12.1 (H) 06/01/2021    WBC 16.8 (H) 05/31/2021    HGB 8.8 (L) 06/02/2021    HCT 29.4 (L) 06/02/2021    MCV 79.0 (L) 06/02/2021     06/02/2021     Lab Results   Component Value Date    NEUTROABS 8.74 (H) 06/02/2021    NEUTROABS 9.20 (H) 06/01/2021    NEUTROABS 13.94 (H) 05/31/2021     No results found for: CRP  Lab Results   Component Value Date    ALT 10 06/02/2021    AST 14 06/02/2021    ALKPHOS 78 06/02/2021    BILITOT 0.4 06/02/2021     Lab Results   Component Value Date     06/02/2021    K 3.4 06/02/2021     06/02/2021    CO2 31 06/02/2021    BUN 15 06/02/2021    CREATININE 0.9 06/02/2021    CREATININE 1.0 06/01/2021    CREATININE 0.9 06/01/2021    GFRAA >60 06/02/2021    LABGLOM >60 06/02/2021    GLUCOSE 110 06/02/2021    PROT 5.9 06/02/2021    LABALBU 2.9 06/02/2021    CALCIUM 8.2 06/02/2021    BILITOT 0.4 06/02/2021    ALKPHOS 78 06/02/2021    AST 14 06/02/2021    ALT 10 06/02/2021     Lab Results   Component Value Date    CRP 22.1 (H) 05/27/2021     No results found for: [address]  Radiology:      Microbiology:   Blood cultures 5/25/2021: Ampicillin resistant Klebsiella pneumoniae in 4 of 4 bottles  Blood cultures 5/27/2021: Negative so far  Nares screen MRSA: Positive  Urine culture ordered but never sent    ASSESSMENT:  · Complicated UTI with sepsis secondary to Klebsiella, resolved  · Klebsiella septicemia associated to complicated UTI.   Repeat blood cultures negative  · Leukocytosis and fever secondary to sepsis and steroids, resolved    PLAN:  · Continue Cefdinir for 5 more days  · The patient can be discharged

## 2021-06-02 NOTE — PLAN OF CARE
Problem: Skin Integrity:  Goal: Will show no infection signs and symptoms  Description: Will show no infection signs and symptoms  Outcome: Met This Shift  Goal: Absence of new skin breakdown  Description: Absence of new skin breakdown  Outcome: Met This Shift     Problem: Falls - Risk of:  Goal: Will remain free from falls  Description: Will remain free from falls  Outcome: Met This Shift  Goal: Absence of physical injury  Description: Absence of physical injury  Outcome: Met This Shift     Problem: ABCDS Injury Assessment  Goal: Absence of physical injury  Outcome: Met This Shift     Problem: Cardiac Output - Decreased:  Goal: Hemodynamic stability will improve  Description: Hemodynamic stability will improve  Outcome: Met This Shift     Problem: Gas Exchange - Impaired:  Goal: Levels of oxygenation will improve  Description: Levels of oxygenation will improve  Outcome: Met This Shift     Problem: Anxiety:  Goal: Level of anxiety will decrease  Description: Level of anxiety will decrease  Outcome: Met This Shift     Problem: Pain:  Goal: Pain level will decrease  Description: Pain level will decrease  Outcome: Met This Shift  Goal: Control of acute pain  Description: Control of acute pain  Outcome: Met This Shift  Goal: Control of chronic pain  Description: Control of chronic pain  Outcome: Met This Shift

## 2021-06-02 NOTE — PROGRESS NOTES
PROGRESS NOTE    Patient Presents with/Seen in Consultation For      *hemoccult +, anemia  CHIEF COMPLAINT:  Blood infection, sent in to r/o sepsis    Subjective:     Patient denies abd pain, n/v. States her breathing is better. Review of Systems  Aside from what was mentioned in the PMH and HPI, essentially unremarkable, all others negative. Objective:     /75   Pulse 75   Temp 97.7 °F (36.5 °C) (Axillary)   Resp 16   Ht 5' (1.524 m)   Wt 185 lb 4.8 oz (84.1 kg)   SpO2 93%   BMI 36.19 kg/m²     General appearance: awake, alert, pale, fatigued and ill-appearing, laying in bed with family at bedside, and cooperative  Eyes: conjunctiva pale, sclera anicteric. PERRL. Lungs:  High flow O2 per nasal cannula, tachypnea - RR 24 bpm, diminished throughout to auscultation bilaterally  Heart: regular rate and rhythm, no murmur, 2+ pulses; trace BLE edema  Abdomen: soft, non-tender; bowel sounds normal; no masses,  no organomegaly  Extremities: extremities with trace BLE edema  Pulses: 2+ and symmetric  Skin: Skin color pale, texture, turgor normal.   Neurologic:  awake, alert. , pt oriented x 3. Speech garbled.     iopamidol (ISOVUE-370) 76 % injection 75 mL, ONCE PRN  sodium chloride flush 0.9 % injection 10 mL, PRN  [START ON 6/3/2021] hydrocortisone sodium succinate PF (SOLU-CORTEF) injection 25 mg, Once  furosemide (LASIX) injection 40 mg, BID  acetaminophen (TYLENOL) tablet 1,000 mg, Q8H  oxyCODONE (ROXICODONE) immediate release tablet 5 mg, Q6H PRN  lidocaine 4 % external patch 1 patch, Daily  enoxaparin (LOVENOX) injection 80 mg, Q12H  donepezil (ARICEPT) tablet 5 mg, Nightly  escitalopram (LEXAPRO) tablet 20 mg, Daily  potassium chloride (KLOR-CON M) extended release tablet 40 mEq, TID WC  cefdinir (OMNICEF) capsule 300 mg, 2 times per day  0.9 % sodium chloride infusion, PRN  mupirocin (BACTROBAN) 2 % ointment, BID  melatonin tablet 3 mg, Nightly PRN  hydrOXYzine (VISTARIL) capsule 25 mg, TID Active Problems:  ? Anemia, microcytic, iron deficiency hemoccult +  ? Septic shock, Klebsiella bacteremia, off pressors- ID following  ? Leukocytosis - ID following  ? Fever -resolved  ? Lactic acidosis -resolved  ? Acute respiratory failure  ? Bilateral PE - defer to PCP  ? Dementia  ? Hx COVID  ? EGD with bx 5/28/21 - Grade B LA classification GERD, small hiatal hernia. Stomach showed gastritis, biopsy negative for H. Pylori infection. Duodenum showed duodenitis with small AVM, biopsy pending to rule out sprue.  No active bleeding seen. Plan:     ? Serial H&H, transfuse per PCP  ? Continue Protonix 40 MG BID  ? Antibiotics per ID  ? Monitor CBC, CMP daily  ? Colonoscopy to be done outpatient - Hgb currently stable, plan for outpatient as pt does not want currently and is requiring high flow O2 -discussed with patient and family, they agree  ? Supportive care  ? Continue to monitor    Note: This report was completed utilizing computer voice recognition software. Every effort has been made to ensure accuracy, however; inadvertent computerized transcription errors may be present.      Discussed with Dr. Vimal Levin per Dr. Freda Rebolledo [id number], FNP-BC 6/2/2021 12:38 PM For Dr. Ambrocio Pelayo

## 2021-06-02 NOTE — PROGRESS NOTES
Houston Vera M.D.,[facility]  Tyrone Anne D.O., F.A.C.O.I., Laney Decker M.D. Calli Lara M.D., Davida Guardado M.D. Colin Rodríguez D.O. Daily Pulmonary Progress Note    Patient:  Tammy Salazar 66 y.o. female MRN: [id number]     Date of Service: 6/2/2021      Synopsis     We are following patient for septic shock, acute respiratory failure with hypoxia, bilateral PE    \"CC\" shortness of breath    Code status: Limited  Intubation/Re-intubation No    Defibrillation/Cardioversion No    Chest Compressions No    Resuscitative Medications No    Other DNR-CCA/No Intubation, ok for NIV/CPAP/BIPAP            Subjective      Patient was seen and examined lying in bed in no apparent distress. She is more awake and interactive today. Not getting out of bed. she is currently on  14 L saturation maintained 93% at rest.  Tolerating aggressive diuresis. 3.8 L urine output in past 24 hours. Only used BiPAP 12/6 yesterday for a few hours in the afternoon. Did not use overnight. Therapeutic lovenox for tx of PE, 1 mg/kg bid. procal 0.48. Review of Systems:   Constitutional: Denies fever, weight loss, night sweats, and fatigue  Skin: Denies pigmentation, dark lesions, and rashes   HEENT: Denies hearing loss, tinnitus, ear drainage, epistaxis, sore throat, and hoarseness. Cardiovascular: Denies palpitations, chest pain, and chest pressure.   Respiratory: Dyspnea  Gastrointestinal: Denies nausea, vomiting, poor appetite, diarrhea, heartburn or reflux  Genitourinary: Denies dysuria, frequency, urgency or hematuria  Musculoskeletal: Back pain      24-hour events:  Persistent hypoxemia    Objective   Vitals: BP (!) 125/59   Pulse 75   Temp 97.8 °F (36.6 °C) (Axillary)   Resp 16   Ht 5' (1.524 m)   Wt 185 lb 4.8 oz (84.1 kg)   SpO2 93%   BMI 36.19 kg/m²     I/O:    Intake/Output Summary (Last 24 hours) at 6/2/2021 1501  Last data filed at 6/2/2021 1411  Gross per 24 hour   Intake 420 ml Output 3000 ml   Net -2580 ml       Vent Information  Skin Assessment: Clean, dry, & intact  FiO2 : 60 %  SpO2: 93 %  I Time/ I Time %: 0.95 s  Mask Type: Full face mask  Mask Size: Medium       IPAP: 12 cmH20  CPAP/EPAP: 6 cmH2O     CURRENT MEDS :  Scheduled Meds:   [START ON 6/3/2021] hydrocortisone sodium succinate PF  25 mg Intravenous Once    furosemide  40 mg Intravenous BID    acetaminophen  1,000 mg Oral Q8H    lidocaine  1 patch Transdermal Daily    enoxaparin  1 mg/kg Subcutaneous Q12H    donepezil  5 mg Oral Nightly    escitalopram  20 mg Oral Daily    potassium chloride  40 mEq Oral TID WC    cefdinir  300 mg Oral 2 times per day    mupirocin   Topical BID    pantoprazole  40 mg Intravenous BID    And    sodium chloride (PF)  10 mL Intravenous BID    Arformoterol Tartrate  15 mcg Nebulization BID    budesonide  0.5 mg Nebulization BID    ipratropium-albuterol  1 ampule Inhalation Q4H WA    insulin lispro  0-12 Units Subcutaneous TID WC    insulin lispro  0-6 Units Subcutaneous Nightly    sodium chloride  250 mL Intravenous Once       Physical Exam:  General Appearance: appears comfortable in no acute distress. Morbidly obese  HEENT: Normocephalic atraumatic without obvious abnormality   Neck: Lips, mucosa, and tongue normal.  Supple, symmetrical, trachea midline, no adenopathy;thyroid:  no enlargement/tenderness/nodules or JVD. Lung: Breath sounds bilateral crackles, diminished. Respirations   unlabored. Symmetrical expansion. Heart: RRR, normal S1, S2. No MRG  Abdomen: Soft, NT, ND. BS present x 4 quadrants. No bruit or organomegaly. Extremities: Pedal pulses 2+ symmetric b/l. Extremities normal, no cyanosis, clubbing, generalized peripheral edema  Musculokeletal: No joint swelling, no muscle tenderness. ROM normal in all joints of extremities.    Neurologic: Mental status: Alert     Pertinent/ New Labs and Imaging Studies     Imaging Personally Reviewed:  6-2-21 chest x-ray

## 2021-06-02 NOTE — CARE COORDINATION
Social Work discharge planning   Sw spoke to Korey Pearson with [facility], who advised pt is not a bed hold but they are continuing to follow pt for return at discharge. Per Korey Pearson with [facility], they need pt to be stable 02 8L NC or lower to be able to accept pt back. Precert NOT needed if they can medically accommodate pt. Kevin also spoke to Kesha Alvarez with [facility], who advised they have accepted pt and have bed tomorrow IF needed. However, chart review indicates that pt's son Nahomy Madera [phone] is not sure about LTAC but is agreeable to [facility] return. Will need to talk to gricelda Madera again IF  wants [address] before snf return.     Electronically signed by Nikki Leon on 6/2/2021 at 3:16 PM

## 2021-06-02 NOTE — PROGRESS NOTES
Date: 6/1/2021    Time: 10:23 PM    Patient Placed On BIPAP/CPAP/ Non-Invasive Ventilation? No    If no must comment. Comments: pt refusing the bipap tonight. Pt educated multiple times on the importance of wearing the bibap, but pt still refusing. Machine is on standby if needed.          Yulia Diaz RCP

## 2021-06-03 VITALS
HEART RATE: 80 BPM | OXYGEN SATURATION: 94 % | BODY MASS INDEX: 37.05 KG/M2 | SYSTOLIC BLOOD PRESSURE: 146 MMHG | HEIGHT: 60 IN | DIASTOLIC BLOOD PRESSURE: 68 MMHG | TEMPERATURE: 97.8 F | RESPIRATION RATE: 18 BRPM | WEIGHT: 188.71 LBS

## 2021-06-03 LAB
ALBUMIN SERPL-MCNC: 3 G/DL (ref 3.5–5.2)
ALP BLD-CCNC: 74 U/L (ref 35–104)
ALT SERPL-CCNC: 7 U/L (ref 0–32)
ANION GAP SERPL CALCULATED.3IONS-SCNC: 10 MMOL/L (ref 7–16)
AST SERPL-CCNC: 11 U/L (ref 0–31)
BASOPHILS ABSOLUTE: 0.01 E9/L (ref 0–0.2)
BASOPHILS RELATIVE PERCENT: 0.1 % (ref 0–2)
BILIRUB SERPL-MCNC: 0.4 MG/DL (ref 0–1.2)
BUN BLDV-MCNC: 12 MG/DL (ref 6–23)
CALCIUM SERPL-MCNC: 8.3 MG/DL (ref 8.6–10.2)
CHLORIDE BLD-SCNC: 97 MMOL/L (ref 98–107)
CO2: 36 MMOL/L (ref 22–29)
CREAT SERPL-MCNC: 0.8 MG/DL (ref 0.5–1)
EOSINOPHILS ABSOLUTE: 0.05 E9/L (ref 0.05–0.5)
EOSINOPHILS RELATIVE PERCENT: 0.4 % (ref 0–6)
GFR AFRICAN AMERICAN: >60
GFR NON-AFRICAN AMERICAN: >60 ML/MIN/1.73
GLUCOSE BLD-MCNC: 105 MG/DL (ref 74–99)
HCT VFR BLD CALC: 33 % (ref 34–48)
HEMOGLOBIN: 9.6 G/DL (ref 11.5–15.5)
IMMATURE GRANULOCYTES #: 0.25 E9/L
IMMATURE GRANULOCYTES %: 2 % (ref 0–5)
LYMPHOCYTES ABSOLUTE: 1.05 E9/L (ref 1.5–4)
LYMPHOCYTES RELATIVE PERCENT: 8.5 % (ref 20–42)
MCH RBC QN AUTO: 23.4 PG (ref 26–35)
MCHC RBC AUTO-ENTMCNC: 29.1 % (ref 32–34.5)
MCV RBC AUTO: 80.5 FL (ref 80–99.9)
METER GLUCOSE: 125 MG/DL (ref 74–99)
METER GLUCOSE: 134 MG/DL (ref 74–99)
METER GLUCOSE: 135 MG/DL (ref 74–99)
MONOCYTES ABSOLUTE: 0.39 E9/L (ref 0.1–0.95)
MONOCYTES RELATIVE PERCENT: 3.1 % (ref 2–12)
NEUTROPHILS ABSOLUTE: 10.64 E9/L (ref 1.8–7.3)
NEUTROPHILS RELATIVE PERCENT: 85.9 % (ref 43–80)
PDW BLD-RTO: 18.6 FL (ref 11.5–15)
PLATELET # BLD: 395 E9/L (ref 130–450)
PMV BLD AUTO: 11.8 FL (ref 7–12)
POTASSIUM REFLEX MAGNESIUM: 3.9 MMOL/L (ref 3.5–5)
RBC # BLD: 4.1 E12/L (ref 3.5–5.5)
SODIUM BLD-SCNC: 143 MMOL/L (ref 132–146)
TOTAL PROTEIN: 5.6 G/DL (ref 6.4–8.3)
WBC # BLD: 12.4 E9/L (ref 4.5–11.5)

## 2021-06-03 PROCEDURE — 82962 GLUCOSE BLOOD TEST: CPT

## 2021-06-03 PROCEDURE — 2580000003 HC RX 258: Performed by: INTERNAL MEDICINE

## 2021-06-03 PROCEDURE — 6370000000 HC RX 637 (ALT 250 FOR IP): Performed by: FAMILY MEDICINE

## 2021-06-03 PROCEDURE — 6360000002 HC RX W HCPCS: Performed by: NURSE PRACTITIONER

## 2021-06-03 PROCEDURE — 6370000000 HC RX 637 (ALT 250 FOR IP): Performed by: SPECIALIST

## 2021-06-03 PROCEDURE — 2580000003 HC RX 258: Performed by: RADIOLOGY

## 2021-06-03 PROCEDURE — 97530 THERAPEUTIC ACTIVITIES: CPT

## 2021-06-03 PROCEDURE — C9113 INJ PANTOPRAZOLE SODIUM, VIA: HCPCS | Performed by: INTERNAL MEDICINE

## 2021-06-03 PROCEDURE — 6360000002 HC RX W HCPCS: Performed by: INTERNAL MEDICINE

## 2021-06-03 PROCEDURE — 6370000000 HC RX 637 (ALT 250 FOR IP): Performed by: INTERNAL MEDICINE

## 2021-06-03 PROCEDURE — 6370000000 HC RX 637 (ALT 250 FOR IP): Performed by: NURSE PRACTITIONER

## 2021-06-03 PROCEDURE — 6360000002 HC RX W HCPCS: Performed by: FAMILY MEDICINE

## 2021-06-03 PROCEDURE — 2700000000 HC OXYGEN THERAPY PER DAY

## 2021-06-03 PROCEDURE — 80053 COMPREHEN METABOLIC PANEL: CPT

## 2021-06-03 PROCEDURE — 36415 COLL VENOUS BLD VENIPUNCTURE: CPT

## 2021-06-03 PROCEDURE — 97535 SELF CARE MNGMENT TRAINING: CPT

## 2021-06-03 PROCEDURE — 99239 HOSP IP/OBS DSCHRG MGMT >30: CPT | Performed by: INTERNAL MEDICINE

## 2021-06-03 PROCEDURE — 85025 COMPLETE CBC W/AUTO DIFF WBC: CPT

## 2021-06-03 PROCEDURE — 94660 CPAP INITIATION&MGMT: CPT

## 2021-06-03 PROCEDURE — APPSS45 APP SPLIT SHARED TIME 31-45 MINUTES: Performed by: NURSE PRACTITIONER

## 2021-06-03 RX ORDER — LIDOCAINE 4 G/G
1 PATCH TOPICAL DAILY
DISCHARGE
Start: 2021-06-04

## 2021-06-03 RX ORDER — LANOLIN ALCOHOL/MO/W.PET/CERES
3 CREAM (GRAM) TOPICAL NIGHTLY PRN
Refills: 3 | DISCHARGE
Start: 2021-06-03

## 2021-06-03 RX ORDER — IPRATROPIUM BROMIDE AND ALBUTEROL SULFATE 2.5; .5 MG/3ML; MG/3ML
3 SOLUTION RESPIRATORY (INHALATION)
Qty: 360 ML | DISCHARGE
Start: 2021-06-03

## 2021-06-03 RX ORDER — OXYCODONE HYDROCHLORIDE 5 MG/1
5 TABLET ORAL EVERY 8 HOURS PRN
Refills: 0 | Status: SHIPPED | DISCHARGE
Start: 2021-06-03 | End: 2021-06-10

## 2021-06-03 RX ORDER — BUDESONIDE 0.5 MG/2ML
0.5 INHALANT ORAL 2 TIMES DAILY
Qty: 60 AMPULE | Refills: 3 | DISCHARGE
Start: 2021-06-03

## 2021-06-03 RX ORDER — ESCITALOPRAM OXALATE 20 MG/1
20 TABLET ORAL DAILY
Qty: 30 TABLET | Refills: 3 | DISCHARGE
Start: 2021-06-04

## 2021-06-03 RX ORDER — FUROSEMIDE 10 MG/ML
40 INJECTION INTRAMUSCULAR; INTRAVENOUS 2 TIMES DAILY
DISCHARGE
Start: 2021-06-03

## 2021-06-03 RX ORDER — CEFDINIR 300 MG/1
300 CAPSULE ORAL EVERY 12 HOURS SCHEDULED
Qty: 20 CAPSULE | Refills: 0 | DISCHARGE
Start: 2021-06-03 | End: 2021-06-07

## 2021-06-03 RX ORDER — ARFORMOTEROL TARTRATE 15 UG/2ML
15 SOLUTION RESPIRATORY (INHALATION) 2 TIMES DAILY
Qty: 120 ML | Refills: 3 | DISCHARGE
Start: 2021-06-03

## 2021-06-03 RX ORDER — POTASSIUM CHLORIDE 20 MEQ/1
40 TABLET, EXTENDED RELEASE ORAL
Qty: 60 TABLET | Refills: 3 | DISCHARGE
Start: 2021-06-03

## 2021-06-03 RX ORDER — LORAZEPAM 2 MG/ML
0.5 INJECTION INTRAMUSCULAR EVERY 6 HOURS PRN
Status: SHIPPED | DISCHARGE
Start: 2021-06-03 | End: 2021-06-10

## 2021-06-03 RX ADMIN — SODIUM CHLORIDE, PRESERVATIVE FREE 10 ML: 5 INJECTION INTRAVENOUS at 17:38

## 2021-06-03 RX ADMIN — CEFDINIR 300 MG: 300 CAPSULE ORAL at 09:46

## 2021-06-03 RX ADMIN — ACETAMINOPHEN 1000 MG: 500 TABLET ORAL at 13:46

## 2021-06-03 RX ADMIN — FUROSEMIDE 40 MG: 10 INJECTION, SOLUTION INTRAMUSCULAR; INTRAVENOUS at 09:45

## 2021-06-03 RX ADMIN — ENOXAPARIN SODIUM 80 MG: 80 INJECTION SUBCUTANEOUS at 09:45

## 2021-06-03 RX ADMIN — PANTOPRAZOLE SODIUM 40 MG: 40 INJECTION, POWDER, FOR SOLUTION INTRAVENOUS at 09:45

## 2021-06-03 RX ADMIN — SODIUM CHLORIDE, PRESERVATIVE FREE 10 ML: 5 INJECTION INTRAVENOUS at 09:45

## 2021-06-03 RX ADMIN — POTASSIUM CHLORIDE 40 MEQ: 1500 TABLET, EXTENDED RELEASE ORAL at 13:46

## 2021-06-03 RX ADMIN — FUROSEMIDE 40 MG: 10 INJECTION, SOLUTION INTRAMUSCULAR; INTRAVENOUS at 17:38

## 2021-06-03 RX ADMIN — POTASSIUM CHLORIDE 40 MEQ: 1500 TABLET, EXTENDED RELEASE ORAL at 09:46

## 2021-06-03 RX ADMIN — ESCITALOPRAM OXALATE 20 MG: 10 TABLET ORAL at 09:46

## 2021-06-03 RX ADMIN — ACETAMINOPHEN 1000 MG: 500 TABLET ORAL at 06:44

## 2021-06-03 RX ADMIN — POTASSIUM CHLORIDE 40 MEQ: 1500 TABLET, EXTENDED RELEASE ORAL at 17:38

## 2021-06-03 RX ADMIN — HYDROCORTISONE SODIUM SUCCINATE 25 MG: 100 INJECTION, POWDER, FOR SOLUTION INTRAMUSCULAR; INTRAVENOUS at 09:45

## 2021-06-03 ASSESSMENT — PAIN SCALES - GENERAL
PAINLEVEL_OUTOF10: 0
PAINLEVEL_OUTOF10: 3
PAINLEVEL_OUTOF10: 5

## 2021-06-03 ASSESSMENT — PAIN DESCRIPTION - PROGRESSION: CLINICAL_PROGRESSION: GRADUALLY WORSENING

## 2021-06-03 ASSESSMENT — PAIN DESCRIPTION - ORIENTATION: ORIENTATION: LOWER

## 2021-06-03 ASSESSMENT — PAIN DESCRIPTION - LOCATION: LOCATION: BACK

## 2021-06-03 ASSESSMENT — PAIN DESCRIPTION - FREQUENCY: FREQUENCY: CONTINUOUS

## 2021-06-03 ASSESSMENT — PAIN DESCRIPTION - DESCRIPTORS: DESCRIPTORS: ACHING

## 2021-06-03 ASSESSMENT — PAIN DESCRIPTION - PAIN TYPE: TYPE: ACUTE PAIN

## 2021-06-03 ASSESSMENT — PAIN - FUNCTIONAL ASSESSMENT: PAIN_FUNCTIONAL_ASSESSMENT: PREVENTS OR INTERFERES SOME ACTIVE ACTIVITIES AND ADLS

## 2021-06-03 ASSESSMENT — PAIN DESCRIPTION - ONSET: ONSET: ON-GOING

## 2021-06-03 NOTE — DISCHARGE SUMMARY
Shay       Hospitalist Physician Discharge Summary       MELITON FISHER [facility] 7th Floor  [address]  [phone]          Activity level: As tolerated    Diet: DIET DENTAL SOFT; Carb Control: 4 carb choices (60 gms)/meal  Dietary Nutrition Supplements: Low Calorie High Protein Supplement      Condition at discharge: Stable    Dispo:[facility]    Continue supplemental oxygen via nasal canula @ 13 LPM round-the-clock. Patient ID:  Delio Cruz  [id number]  32 y.o.  1945    Admit date: 5/25/2021    Discharge date and time:  6/3/2021  3:50 PM    Admission Diagnoses: Active Problems:    Septic shock ([address]    Complicated UTI (urinary tract infection)  Resolved Problems:    * No resolved hospital problems. *      Discharge Diagnoses: Active Problems:    Septic shock ([address]    Complicated UTI (urinary tract infection)  Resolved Problems:    * No resolved hospital problems. *      Consults:  IP CONSULT TO PALLIATIVE CARE  IP CONSULT TO SOCIAL WORK  IP CONSULT TO CRITICAL CARE  IP CONSULT TO INFECTIOUS DISEASES  IP CONSULT TO GI  IP CONSULT TO IV TEAM    Procedures:   5/26/21 arterial line placement--  5/28/2021 EGD--Grade B LA classification GERD, small hiatal hernia. Stomach showed gastritis, biopsy negative for H. Pylori infection. Duodenum showed duodenitis with small AVM, biopsy pending to rule out sprue.  No active bleeding seen. Dr. Aman Goodman Course:     1125/2021 49-year-old female PMH HTN, stage II diastolic heart failure, and diabetes mellitus presented from [address] with concern of probable infection. Upon presentation temperature noted to be 105 °F.  Shortly after presentation BP significantly decreased requiring pressors. Concern for sepsis. IVF bolus/Solu-Cortef given in ED. Patient placed on broad-spectrum antibiotics. Found to be hypoxic. Placed on 6L NC SPO2 96%.   CTA of chest with bilateral distress  Cardiovascular: normal rate, normal S1 and S2 and no rubs, gallops, murmur noted. Abdomen: soft, non-tender, non-distended, normal bowel sounds,  Extremities: no cyanosis, no clubbing and generalized peripheral edema  Neurologic: no cranial nerve deficit and speech normal     I/O last 3 completed shifts: In: 550 [P.O.:540; I.V.:10]  Out: 5150 [Urine:5150]  No intake/output data recorded. LABS:  Recent Labs     06/01/21 1751 06/02/21 0251 06/03/21 0338    142 143   K 3.1* 3.4* 3.9    101 97*   CO2 31* 31* 36*   BUN 16 15 12   CREATININE 1.0 0.9 0.8   GLUCOSE 116* 110* 105*   CALCIUM 8.2* 8.2* 8.3*       Recent Labs     06/01/21 0338 06/02/21 0251 06/03/21  0338   WBC 12.1* 10.8 12.4*   RBC 3.75 3.72 4.10   HGB 9.1* 8.8* 9.6*   HCT 30.1* 29.4* 33.0*   MCV 80.3 79.0* 80.5   MCH 24.3* 23.7* 23.4*   MCHC 30.2* 29.9* 29.1*   RDW 18.1* 18.4* 18.6*    303 395   MPV 11.2 10.1 11.8       No results for input(s): POCGLU in the last 72 hours. Imaging:  Echo Complete    Result Date: 5/26/2021  Transthoracic Echocardiography Report (TTE)  Demographics   [facility]        Gender            Female                  IRINA   Medical Record  [id number]     Room Number       0207  Number   Account #       [de-identified]    Procedure Date    05/26/2021   Corporate ID                 Ordering                               Physician   Accession       [id number]   Referring         Kimberly Miranda MD  Number                       Physician         Aaron Johnson MD   Date of Birth   1945   Sonographer       Nicole NEW   Age             66 year(s)   Interpreting      [address] [place] [address]                               Physician         Physician Cardiology                                                 Abel Kelsey MD                                Any Other  Procedure Type of Study   TTE procedure:Echo Complete W/Doppler & Color Flow.   Procedure Date Date: 05/26/2021 Start: 07:40 AM Study Location: Portable Technical Quality: Poor visualization due to patient immobility. Indications:Pulmonary embolus. Patient Status: Routine Contrast Medium: Definity. Height: 60 inches Weight: 164 pounds BSA: 1.72 m^2 BMI: 32.03 kg/m^2 HR: 90 bpm BP: 100/42 mmHg  Findings   Left Ventricle  Left ventricular internal dimensions were normal in diastole and systole. Borderline concentric left ventricular hypertrophy. Micro-bubble contrast injected to enhance left ventricular visualization. No regional wall motion abnormalities seen. Normal left ventricular ejection fraction. Ejection fraction is visually estimated at 55-60%. Indeterminate diastolic function. Right Ventricle  Normal right ventricular size and function. Left Atrium  Normal sized left atrium. Interatrial septum appears intact. Right Atrium  Normal right atrium size. Mitral Valve  Structurally normal mitral valve. Mild mitral annular calcification. Tricuspid Valve  The tricuspid valve appears structurally normal.   Aortic Valve  The aortic valve appears mildly sclerotic. Pulmonic Valve  Pulmonic valve is structurally normal.   Pericardial Effusion  No evidence of pericardial effusion. Aorta  Aortic root dimension within normal limits. Conclusions   Summary  Left ventricular internal dimensions were normal in diastole and systole. Borderline concentric left ventricular hypertrophy. No regional wall motion abnormalities seen. Normal left ventricular ejection fraction. Mild mitral annular calcification. The aortic valve appears mildly sclerotic.    Signature   ----------------------------------------------------------------  Electronically signed by Robin Marquis MD(Interpreting  physician) on 05/26/2021 07:18 PM  ----------------------------------------------------------------  M-Mode/2D Measurements & Calculations   LV Diastolic    LV Systolic Dimension: 3 cm  AV Cusp Separation: 1.8 cmLA  Dimension: 4.2  LV Volume Diastolic: 98.6 ml Dimension: 3.3 cmAO Root  cm              LV Volume Systolic: 78.0 ml  Dimension: 2.5 cm  LV FS:28.6 %    LV EDV/LV EDV Index: 77.7  LV PW           ml/45 ml/m^2LV ESV/LV ESV  Diastolic: 1.1  Index: 44.0 ml/20ml/ m^2  cm              EF Calculated: 56.6 %        RV Diastolic Dimension: 2.6  LV PW Systolic: LV Mass Index: 91 l/min*m^2  cm  1.3 cm  Septum                                       LA/Aorta: 3.32  Diastolic: 1.1  LVOT: 2 cm                   Ascending Aorta: 2.7 cm  cm                                           LA volume/Index: 37 ml  Septum                                       /69LD/G^0  Systolic: 1.3                                RA Area: 13.2 cm^2  cm  CO: 6.44 l/min                               IVC Expiration: 1.3 cm  CI: 3.74  l/m*m^2  LV Mass: 157.06  g  Doppler Measurements & Calculations   MV Peak E-Wave: 1.04 AV Peak Velocity:     LVOT Peak Velocity: 1.15 m/s  m/s                  1.49 m/s              LVOT Mean Velocity: 0.82 m/s  MV Peak A-Wave: 1.26 AV Peak Gradient:     LVOT Peak Gradient: 5.3  m/s                  8.92 mmHg             mmHgLVOT Mean Gradient: 3 mmHg  MV E/A Ratio: 0.82   AV Mean Velocity:  MV Peak Gradient:    1.03 m/s  6.2 mmHg             AV Mean Gradient: 4.7  MV Mean Gradient:    mmHg                  TR Velocity:2.43 m/s  2.9 mmHg             AV VTI: 24.7 cm       TR Gradient:23.7 mmHg  MV Mean Velocity:    AV Area  0.81 m/s             (Continuity):2.9 cm^2  MV Deceleration  Time: 159.1 msec     LVOT VTI: 22.8 cm  MV P1/2t: 66.7 msec  IVRT: 69.2 msec  MVA by PHT:3.3 cm^2  MV Area  (continuity): 2.6  cm^2  MV E' Septal  Velocity: 0.05 m/s  MV E' Lateral  Velocity: 8 m/s  [email or web address] [id number]    CT ABDOMEN PELVIS WO CONTRAST Additional Contrast? None    Result Date: 5/25/2021  EXAMINATION: CT OF THE ABDOMEN AND PELVIS WITHOUT CONTRAST 5/25/2021 8:26 pm TECHNIQUE: CT of the abdomen and pelvis was performed without the administration of intravenous contrast. Multiplanar reformatted images are provided for review. Dose modulation, iterative reconstruction, and/or weight based adjustment of the mA/kV was utilized to reduce the radiation dose to as low as reasonably achievable. COMPARISON: None. HISTORY: ORDERING SYSTEM PROVIDED HISTORY: sepsis TECHNOLOGIST PROVIDED HISTORY: Reason for exam:->sepsis Additional Contrast?->None FINDINGS: Lower Chest: Atelectatic changes and pleuroparenchymal scarring in the lung bases, likely chronic. Images significantly degraded by respiratory motion artifact. Organs: Diffuse fatty infiltration of the liver. Large gallstones in the dependent portion of the gallbladder. The spleen, pancreas are within normal limits. Mostly hypodense and homogeneous right adrenal nodule measuring approximately 2.8 cm in diameter, probable adenoma. Further evaluation with adrenal washout CT or chemical shift MRI recommended. There is contrast in the renal collecting systems from prior CTA of the chest.  No evidence of obstructive uropathy. Incidental l renal cysts. Bilateral perinephric stranding, which may be chronic. GI/Bowel: No evidence of bowel obstruction or free intraperitoneal air. No colitis or diverticulitis. No signs of appendicitis. Pelvis: There is prominent artifact in the pelvis secondary to bilateral hip prosthesis. Blair catheter in the decompressed urinary bladder. Peritoneum/Retroperitoneum: Atherosclerotic calcification along the abdominal aorta with no evidence of aneurysmal dilatation. Bones/Soft Tissues: No acute bony pathology. Large gallstones in the dependent portion of the gallbladder. Mostly hypodense and homogeneous right adrenal nodule measuring 2.8 cm in diameter, probable adenoma. Further evaluation with adrenal washout CT or chemical shift MRI recommended.  Other chronic or incidental findings as noted.     CT HEAD WO CONTRAST    Result Date: 5/25/2021  EXAMINATION: CT OF THE HEAD WITHOUT CONTRAST  5/25/2021 4:32 pm TECHNIQUE: CT of the head was performed without the administration of intravenous contrast. Dose modulation, iterative reconstruction, and/or weight based adjustment of the mA/kV was utilized to reduce the radiation dose to as low as reasonably achievable. COMPARISON: None. HISTORY: ORDERING SYSTEM PROVIDED HISTORY: AMS TECHNOLOGIST PROVIDED HISTORY: Has a \"code stroke\" or \"stroke alert\" been called? ->No Reason for exam:->AMS Decision Support Exception - unselect if not a suspected or confirmed emergency medical condition->Emergency Medical Condition (MA) FINDINGS: BRAIN/VENTRICLES: There is no acute intracranial hemorrhage, mass effect or midline shift. No abnormal extra-axial fluid collection. The gray-white differentiation is maintained without evidence of an acute infarct. There is no evidence of hydrocephalus. Periventricular white matter changes consistent chronic microvascular disease. Diffuse volume loss. ORBITS: The visualized portion of the orbits demonstrate no acute abnormality. SINUSES: The visualized paranasal sinuses and mastoid air cells demonstrate no acute abnormality. SOFT TISSUES/SKULL:  No acute abnormality of the visualized skull or soft tissues. No acute intracranial abnormality. Periventricular white matter changes consistent chronic microvascular disease. Diffuse volume loss. XR CHEST PORTABLE    Result Date: 5/25/2021  EXAMINATION: ONE XRAY VIEW OF THE CHEST 5/25/2021 3:18 pm COMPARISON: None. HISTORY: ORDERING SYSTEM PROVIDED HISTORY: sepsis TECHNOLOGIST PROVIDED HISTORY: Reason for exam:->sepsis FINDINGS: Interstitial opacities are present in perihilar locations. No evidence of pleural effusion. The heart is normal size. No pneumothorax. Interstitial opacities in perihilar locations could suggest subsegmental atelectasis or bronchopneumonia. Short-term follow-up may be helpful for further evaluation. CTA CHEST W CONTRAST    Result Date: 5/25/2021  EXAMINATION: CTA OF THE CHEST 5/25/2021 4:29 pm TECHNIQUE: CTA of the chest was performed after the administration of intravenous contrast.  Multiplanar reformatted images are provided for review. MIP images are provided for review. Dose modulation, iterative reconstruction, and/or weight based adjustment of the mA/kV was utilized to reduce the radiation dose to as low as reasonably achievable. COMPARISON: None. HISTORY: ORDERING SYSTEM PROVIDED HISTORY: Possible PE TECHNOLOGIST PROVIDED HISTORY: Reason for exam:->Possible PE Decision Support Exception - unselect if not a suspected or confirmed emergency medical condition->Emergency Medical Condition (MA) FINDINGS: There are motion artifacts particular for the lower lobes causing misregistration of the images and loss of detail. There are a group of segment/subsegmental branches of the right lower lobe pulmonary artery towards the posterior segmental region which are not filling properly with contrast more likely represent a form of acute/subacute pulmonary embolus. These are seen on imaging A2/109-115 A3/85-87, A4/92. There is also some incomplete filling of more distal subsegmental branches of the left lower lobe. No acute pulmonary embolus seen in the more central pulmonary arteries. The diameter for the main pulmonary artery is 3.3 cm and for the left main pulmonary artery is 2.6 cm and for the right main pulmonary artery is 2 cm. There is no aneurysm formation or dissection of the thoracic aorta. Heart is normal size. The some calcifications are seen in the coronary arteries. There is no pericardial effusion. No mediastinal mass or adenopathy seen. Lymph nodes in the mediastinum are borderline in size and number. There are areas of a peripheral subsegmental atelectasis in both the upper lobes.   The there is also some additional areas of atelectasis in the lower lobes. The motion artifacts again causes misregistration of the images and loss of detail in the lung parenchyma. Upper abdominal structures demonstrates some a hydronephrotic like changes for the right kidney not fully covered on this study. The can follow with a KUB which will function like an late IVP imaging for the right kidney. The is a large size gallstone in the upper body of the gallbladder measures 15 mm. There is a hyperdense cyst likely in the upper pole of the left kidney to be further evaluated with ultrasound measuring 18 mm. There is a nodular appearance for the left adrenal gland with relative low density which could represent a nonfunctioning adenoma but proper evaluation will required a adrenal protocol study on routine basis with the CT or MRI. 1.  There are limitation on the present study due motion artifacts caused misregistration of the images as above discussed. 2.  However there appears to be signs for acute/subacute pulmonary embolus in the lower lobe pulmonary arteries more noticeable on the right lower lobe as above commented. Preliminary report given to Dr. Tammy Lr, ER physician borderline. US DUP LOWER EXTREMITIES BILATERAL VENOUS    Result Date: 5/25/2021  EXAMINATION: DUPLEX VENOUS ULTRASOUND OF THE BILATERAL LOWER EXTREMITIES, 5/25/2021 11:36 pm TECHNIQUE: Duplex ultrasound using B-mode/gray scaled imaging and Doppler spectral analysis and color flow was obtained of the bilateral lower extremities. COMPARISON: None. HISTORY: ORDERING SYSTEM PROVIDED HISTORY: dvt TECHNOLOGIST PROVIDED HISTORY: Reason for exam:->dvt What reading provider will be dictating this exam?->CRC FINDINGS: Limitations: Unable to visualize the right common femoral vein/greater saphenous vein due to the presence of an IV catheter. Limited evaluation of the left calf veins. The visualized veins of the bilateral lower extremities are patent and free of echogenic thrombus. The veins demonstrate good compressibility with normal color flow study and spectral analysis. No evidence of DVT in either lower extremity given the limitations described. Patient Instructions:   Current Discharge Medication List      START taking these medications    Details   cefdinir (OMNICEF) 300 MG capsule Take 1 capsule by mouth every 12 hours for 4 days  Qty: 20 capsule, Refills: 0      oxyCODONE (ROXICODONE) 5 MG immediate release tablet Take 1 tablet by mouth every 8 hours as needed for Pain for up to 7 days. Refills: 0    Comments: Reduce doses taken as pain becomes manageable  Associated Diagnoses: Pain      LORazepam (ATIVAN) 2 MG/ML injection Infuse 0.25 mLs intravenously every 6 hours as needed (Anxiety) for up to 7 days. Associated Diagnoses: Anxiety      Arformoterol Tartrate (BROVANA) 15 MCG/2ML NEBU Take 2 mLs by nebulization 2 times daily  Qty: 120 mL, Refills: 3      budesonide (PULMICORT) 0.5 MG/2ML nebulizer suspension Take 2 mLs by nebulization 2 times daily  Qty: 60 ampule, Refills: 3      ipratropium-albuterol (DUONEB) 0.5-2.5 (3) MG/3ML SOLN nebulizer solution Inhale 3 mLs into the lungs every 4 hours (while awake)  Qty: 360 mL      enoxaparin (LOVENOX) 80 MG/0.8ML injection Inject 0.8 mLs into the skin every 12 hours  Refills: 3      lidocaine 4 % external patch Place 1 patch onto the skin daily      mupirocin (BACTROBAN) 2 % ointment Apply topically 3 times daily.       furosemide (LASIX) 10 MG/ML injection Infuse 4 mLs intravenously 2 times daily      melatonin 3 MG TABS tablet Take 1 tablet by mouth nightly as needed (Insomnia)  Refills: 3      potassium chloride (KLOR-CON M) 20 MEQ extended release tablet Take 2 tablets by mouth 3 times daily (with meals)  Qty: 60 tablet, Refills: 3         CONTINUE these medications which have CHANGED    Details   escitalopram (LEXAPRO) 20 MG tablet Take 1 tablet by mouth daily  Qty: 30 tablet, Refills: 3         CONTINUE these

## 2021-06-03 NOTE — PLAN OF CARE
Problem: Skin Integrity:  Goal: Will show no infection signs and symptoms  Description: Will show no infection signs and symptoms  6/3/2021 1237 by Paola Hurtado RN  Outcome: Met This Shift  6/3/2021 0113 by Gertrude Lynn RN  Outcome: Met This Shift  Goal: Absence of new skin breakdown  Description: Absence of new skin breakdown  6/3/2021 1237 by Paola Hurtado RN  Outcome: Met This Shift  6/3/2021 0113 by Gertrude Lynn RN  Outcome: Met This Shift     Problem: Falls - Risk of:  Goal: Will remain free from falls  Description: Will remain free from falls  6/3/2021 1237 by Paola Hurtado RN  Outcome: Met This Shift  6/3/2021 0113 by Gertrude Lynn RN  Outcome: Met This Shift  Goal: Absence of physical injury  Description: Absence of physical injury  Outcome: Met This Shift     Problem: ABCDS Injury Assessment  Goal: Absence of physical injury  Outcome: Met This Shift     Problem: Anxiety:  Goal: Level of anxiety will decrease  Description: Level of anxiety will decrease  Outcome: Met This Shift     Problem: Pain:  Goal: Pain level will decrease  Description: Pain level will decrease  Outcome: Met This Shift  Goal: Control of acute pain  Description: Control of acute pain  Outcome: Met This Shift  Goal: Control of chronic pain  Description: Control of chronic pain  Outcome: Met This Shift

## 2021-06-03 NOTE — PROGRESS NOTES
[address] [place] [address] [facility]  Progress Note    SUBJECTIVE:  Chief Complaint   Patient presents with    Blood Infection     sent in for sepsis rule out     No new complaints today. Denies any pain. No nausea, vomiting or diarrhea. Review of systems:  As stated above in the chief complaint, otherwise negative. Medications:  Scheduled Meds:   furosemide  40 mg Intravenous BID    acetaminophen  1,000 mg Oral Q8H    lidocaine  1 patch Transdermal Daily    enoxaparin  1 mg/kg Subcutaneous Q12H    donepezil  5 mg Oral Nightly    escitalopram  20 mg Oral Daily    potassium chloride  40 mEq Oral TID WC    cefdinir  300 mg Oral 2 times per day    pantoprazole  40 mg Intravenous BID    And    sodium chloride (PF)  10 mL Intravenous BID    Arformoterol Tartrate  15 mcg Nebulization BID    budesonide  0.5 mg Nebulization BID    ipratropium-albuterol  1 ampule Inhalation Q4H WA    insulin lispro  0-12 Units Subcutaneous TID WC    insulin lispro  0-6 Units Subcutaneous Nightly    sodium chloride  250 mL Intravenous Once     Continuous Infusions:   sodium chloride      sodium chloride      dextrose       PRN Meds:sodium chloride flush, oxyCODONE, sodium chloride, melatonin, hydrOXYzine, LORazepam, sodium chloride flush, sodium chloride, glucose, dextrose, glucagon (rDNA), dextrose, perflutren lipid microspheres    OBJECTIVE:  /87   Pulse 80   Temp 97.6 °F (36.4 °C) (Oral)   Resp 18   Ht 5' (1.524 m)   Wt 188 lb 11.4 oz (85.6 kg)   SpO2 94%   BMI 36.86 kg/m²   Temp  Av.8 °F (36.6 °C)  Min: 97.6 °F (36.4 °C)  Max: 97.9 °F (36.6 °C)  Constitutional: The patient is lying in bed. She is awake and alert. No distress. Skin: Warm and dry. No rashes were noted. HEENT: Round and reactive pupils. Moist mucous membranes. No ulcerations or thrush. Neck: Supple to movements. Chest: No respiratory distress. No crackles.   Cardiovascular: Heart sounds rhythmic and regular. Abdomen: Positive bowel sounds to auscultation. Benign to palpation. Extremities: No edema. Bilateral TKR. Lines: Peripheral  Blair catheter. Laboratory and Tests Review:  Lab Results   Component Value Date    WBC 12.4 (H) 06/03/2021    WBC 10.8 06/02/2021    WBC 12.1 (H) 06/01/2021    HGB 9.6 (L) 06/03/2021    HCT 33.0 (L) 06/03/2021    MCV 80.5 06/03/2021     06/03/2021     Lab Results   Component Value Date    NEUTROABS 10.64 (H) 06/03/2021    NEUTROABS 8.74 (H) 06/02/2021    NEUTROABS 9.20 (H) 06/01/2021     No results found for: CRPHS  Lab Results   Component Value Date    ALT 7 06/03/2021    AST 11 06/03/2021    ALKPHOS 74 06/03/2021    BILITOT 0.4 06/03/2021     Lab Results   Component Value Date     06/03/2021    K 3.9 06/03/2021    CL 97 06/03/2021    CO2 36 06/03/2021    BUN 12 06/03/2021    CREATININE 0.8 06/03/2021    CREATININE 0.9 06/02/2021    CREATININE 1.0 06/01/2021    GFRAA >60 06/03/2021    LABGLOM >60 06/03/2021    GLUCOSE 105 06/03/2021    PROT 5.6 06/03/2021    LABALBU 3.0 06/03/2021    CALCIUM 8.3 06/03/2021    BILITOT 0.4 06/03/2021    ALKPHOS 74 06/03/2021    AST 11 06/03/2021    ALT 7 06/03/2021     Lab Results   Component Value Date    CRP 22.1 (H) 05/27/2021     No results found for: [address]  Radiology:      Microbiology:   Blood cultures 5/25/2021: Ampicillin resistant Klebsiella pneumoniae in 4 of 4 bottles  Blood cultures 5/27/2021: Negative so far  Nares screen MRSA: Positive  Urine culture ordered but never sent    ASSESSMENT:  · Complicated UTI with sepsis secondary to Klebsiella, resolved  · Klebsiella septicemia associated to complicated UTI. Repeat blood cultures negative  · Leukocytosis and fever secondary to sepsis and steroids, resolved    PLAN:  · Continue Cefdinir for 4 more days.   Reconciled  · Patient will be going to an LTAC today  · ID will sign off    Spoke with pulmonary    Russell Singh MD  11:50 AM  6/3/2021

## 2021-06-03 NOTE — PROGRESS NOTES
Gay Gaspar M.D.,[facility]  Keisha Rivers D.O., F.A.C.O.I., Mary Beth Holland M.D. Leida Lyon M.D., Yanelis Casillas M.D. Katerine Levin D.O. Daily Pulmonary Progress Note    Patient:  Zulma Hernandez 66 y.o. female MRN: [id number]     Date of Service: 6/3/2021      Synopsis     We are following patient for septic shock, acute respiratory failure with hypoxia, bilateral PE    \"CC\" shortness of breath    Code status: Limited  Intubation/Re-intubation No    Defibrillation/Cardioversion No    Chest Compressions No    Resuscitative Medications No    Other DNR-CCA/No Intubation, ok for NIV/CPAP/BIPAP            Subjective      Patient was seen and examined lying in bed in no apparent distress. She is more awake and interactive today. Not getting out of bed. she is currently on  13 L saturation maintained 93% at rest.  Tolerating aggressive diuresis. 5.0 L urine output in past 24 hours. Only used BiPAP 12/6 yesterday for a few hours in the afternoon. Did not use overnight. Therapeutic lovenox for tx of  Previous PE, 1 mg/kg bid. procal 0.48. repeat CTA chest 6/2/21. No acute PE, tiny partially occlusive embolus RLL likely chronic, unchanged 5/25/21 study. + bilateral pleural effusions noted. Pt does not move too much out of bed. Sat at bedside today. PT, OT working with her. Review of Systems:   Constitutional: Denies fever, weight loss, night sweats, and fatigue  Skin: Denies pigmentation, dark lesions, and rashes   HEENT: Denies hearing loss, tinnitus, ear drainage, epistaxis, sore throat, and hoarseness. Cardiovascular: Denies palpitations, chest pain, and chest pressure.   Respiratory: Dyspnea  Gastrointestinal: Denies nausea, vomiting, poor appetite, diarrhea, heartburn or reflux  Genitourinary: Denies dysuria, frequency, urgency or hematuria  Musculoskeletal: Back pain      24-hour events:  Persistent hypoxemia    Objective   Vitals: /87   Pulse 80   Temp 97.6 °F (36.4 °C) (Oral)   Resp 18   Ht 5' (1.524 m)   Wt 188 lb 11.4 oz (85.6 kg)   SpO2 94%   BMI 36.86 kg/m²     I/O:    Intake/Output Summary (Last 24 hours) at 6/3/2021 1512  Last data filed at 6/3/2021 1348  Gross per 24 hour   Intake 550 ml   Output 5150 ml   Net -4600 ml       Vent Information  Skin Assessment: Clean, dry, & intact  FiO2 : 60 %  SpO2: 94 %  I Time/ I Time %: 0.95 s  Mask Type: Full face mask  Mask Size: Medium       IPAP: 12 cmH20  CPAP/EPAP: 6 cmH2O     CURRENT MEDS :  Scheduled Meds:   furosemide  40 mg Intravenous BID    acetaminophen  1,000 mg Oral Q8H    lidocaine  1 patch Transdermal Daily    enoxaparin  1 mg/kg Subcutaneous Q12H    donepezil  5 mg Oral Nightly    escitalopram  20 mg Oral Daily    potassium chloride  40 mEq Oral TID WC    cefdinir  300 mg Oral 2 times per day    pantoprazole  40 mg Intravenous BID    And    sodium chloride (PF)  10 mL Intravenous BID    Arformoterol Tartrate  15 mcg Nebulization BID    budesonide  0.5 mg Nebulization BID    ipratropium-albuterol  1 ampule Inhalation Q4H WA    insulin lispro  0-12 Units Subcutaneous TID WC    insulin lispro  0-6 Units Subcutaneous Nightly    sodium chloride  250 mL Intravenous Once       Physical Exam:  General Appearance: appears comfortable in no acute distress. Morbidly obese  HEENT: Normocephalic atraumatic without obvious abnormality   Neck: Lips, mucosa, and tongue normal.  Supple, symmetrical, trachea midline, no adenopathy;thyroid:  no enlargement/tenderness/nodules or JVD. Lung: Breath sounds bilateral crackles, diminished. Respirations   unlabored. Symmetrical expansion. Heart: RRR, normal S1, S2. No MRG  Abdomen: Soft, NT, ND. BS present x 4 quadrants. No bruit or organomegaly. Extremities: Pedal pulses 2+ symmetric b/l. Extremities normal, no cyanosis, clubbing, generalized peripheral edema  Musculokeletal: No joint swelling, no muscle tenderness. ROM normal in all joints of extremities. Neurologic: Mental status: Alert     Pertinent/ New Labs and Imaging Studies     Imaging Personally Reviewed:  6-2-21 chest x-ray      FINDINGS:   Patchy bilateral infiltrates and some prominence of the pulmonary vascularity   are unchanged.  There is significant left lower lobe infiltrate and small   effusion.  Heart size is normal.           Impression   No significant interval change           5/25/2021 CTA chest  1.  There are limitation on the present study due motion artifacts caused   misregistration of the images as above discussed.       2.  However there appears to be signs for acute/subacute pulmonary embolus in   the lower lobe pulmonary arteries more noticeable on the right lower lobe as   above commented. CTA chest 6/2/21  1. No acute pulmonary embolus is seen. 2. Tiny eccentric, partially-occlusive embolus in the right lower lobe is   likely chronic, at least unchanged as of 05/25/2021.   3. Mild cardiomegaly with moderate pleural effusions. 4. Streaky pulmonary opacities in the upper lobes likely represent scarring. No definite evidence of pneumonia. 5. Left adrenal adenoma.               ECHO  5/26/2021  Left ventricular internal dimensions were normal in diastole and systole. Borderline concentric left ventricular hypertrophy. No regional wall motion abnormalities seen. Normal left ventricular ejection fraction. Mild mitral annular calcification. The aortic valve appears mildly sclerotic.     Labs:  Lab Results   Component Value Date    WBC 12.4 06/03/2021    HGB 9.6 06/03/2021    HCT 33.0 06/03/2021    MCV 80.5 06/03/2021    MCH 23.4 06/03/2021    MCHC 29.1 06/03/2021    RDW 18.6 06/03/2021     06/03/2021    MPV 11.8 06/03/2021     Lab Results   Component Value Date     06/03/2021    K 3.9 06/03/2021    CL 97 06/03/2021    CO2 36 06/03/2021    BUN 12 06/03/2021    CREATININE 0.8 06/03/2021    LABALBU 3.0 06/03/2021    CALCIUM 8.3 06/03/2021    GFRAA >60 06/03/2021    LABGLOM >60 06/03/2021     No results found for: PROTIME, INR  No results for input(s): PROBNP in the last 72 hours. Recent Labs     06/01/21  1751   PROCAL 0.48*     This SmartLink has not been configured with any valid records. Assessment:    1. Acute respiratory failure with hypoxia  2. Bilateral pulmonary embolism  3. Shock, sepsis  4. Heart failure with preserved EF 55 to 60%  5. Fluid volume overload/pulmonary edema  6. Obesity hypoventilation syndrome  7. Back pain  8. EGD with bx 5/28/21 - Grade B LA classification GERD, small hiatal hernia. Stomach showed gastritis, biopsy negative for H. Pylori infection. Duodenum showed duodenitis with small AVM, biopsy pending to rule out sprue.  No active bleeding seen. 9. Type 2 diabetes mellitus      Plan:   1. Oxygen therapy 13  L high flow nasal cannula keep greater than 92%  2. ABG 6/1/2021 with no evidence of hypercapnia  3. BiPAP 12/6 for respiratory support at bedtime and during day with naps, as needed for hypoxia. Limited CODE STATUS okay for NIV,  no for intubation  4. Scheduled bronchodilators -Brovana/budesonide twice a day, DuoNebs every 4 hours while awake- add EZPAP Q 4 hrs  5. CTA chest bilateral moderate pleural effusions. No acute PE. tiny partially occlusive filling defect RLL likely chronic, unchanged from CTA 5/25/21. procalcitonin 0.48   if no improvement in oxygen with aggressive diuresis,  may conside thoracentesis   6. Continue aggressive diuresis Lasix 40 mg IV twice daily, 5 L urine output in past 24 hours  7. Solu-Cortef 25 mg IV x 1 repeated per medical team  8. GI service following for GERD, gastritis. C scope to be done as OP  9. Antibiotics per ID-switch to [address]  10. Monitor H/H, 9.1  11. 2D echo 5/26/2021 with no evidence of RV strain. 12. Therapeutic dose Lovenox 1 mg/kg for anticoagulation  13. GI prophylaxis-Protonix  14. LTAC transfer   15. Increase activity OOB.  PT, OT  This plan of care was reviewed in collaboration with   Electronically signed by PEDRO Anaya CNP on 6/3/2021 at 3:12 PM    Addendum; Patient still continues to require high flow oxygen. Reviewed her CT scan. Negative for PE but she does have moderate bilateral effusions with significant atelectasis. Patient has been refusing to get out of bed. She needs lung recruitment. Discussed with her nursing staff to at least encourage her to get into a chair we will add EZ Pap. I personally saw, examined, and cared for the patient. Labs, medications, radiographs reviewed. I agree with history exam and plans detailed in NP note.   Marylou Prakash MD

## 2021-06-03 NOTE — PROGRESS NOTES
PROGRESS NOTE    Patient Presents with/Seen in Consultation For      *hemoccult +, anemia  CHIEF COMPLAINT:  Blood infection, sent in to r/o sepsis    Subjective:     Patient seen laying in bed. States she feels well however still SOB with exertion, noted to be on 13 L hi-flow 02. Denies abdominal pain, nausea or vomiting. Tolerating diet. With BM yesterday, denies melena or hematochezia. Review of Systems  Aside from what was mentioned in the PMH and HPI, essentially unremarkable, all others negative. Objective:     /87   Pulse 80   Temp 97.6 °F (36.4 °C) (Oral)   Resp 18   Ht 5' (1.524 m)   Wt 188 lb 11.4 oz (85.6 kg)   SpO2 94%   BMI 36.86 kg/m²        General appearance: awake, alert, pale, fatigued and ill-appearing, laying in bed, and cooperative  Eyes: conjunctiva pale, sclera anicteric. PERRL. Lungs:  High flow O2 per nasal cannula, diminished throughout to auscultation bilaterally  Heart: regular rate and rhythm, no murmur, 2+ pulses; trace BLE edema  Abdomen: soft, non-tender; bowel sounds normal; no masses,  no organomegaly  Extremities: extremities with trace BLE edema  Pulses: 2+ and symmetric  Skin: Skin color pale, texture, turgor normal.   Neurologic:  awake, alert. , pt oriented x 3. Speech garbled.     sodium chloride flush 0.9 % injection 10 mL, PRN  furosemide (LASIX) injection 40 mg, BID  acetaminophen (TYLENOL) tablet 1,000 mg, Q8H  oxyCODONE (ROXICODONE) immediate release tablet 5 mg, Q6H PRN  lidocaine 4 % external patch 1 patch, Daily  enoxaparin (LOVENOX) injection 80 mg, Q12H  donepezil (ARICEPT) tablet 5 mg, Nightly  escitalopram (LEXAPRO) tablet 20 mg, Daily  potassium chloride (KLOR-CON M) extended release tablet 40 mEq, TID WC  cefdinir (OMNICEF) capsule 300 mg, 2 times per day  0.9 % sodium chloride infusion, PRN  melatonin tablet 3 mg, Nightly PRN  hydrOXYzine (VISTARIL) capsule 25 mg, TID PRN  LORazepam (ATIVAN) injection 0.5 mg, Q6H PRN  pantoprazole (PROTONIX) microcytic, iron deficiency hemoccult +  ? Septic shock, Klebsiella bacteremia, off pressors- ID following  ? Leukocytosis - ID following  ? Fever -resolved  ? Lactic acidosis -resolved  ? Acute respiratory failure  ? Bilateral PE - defer to PCP  ? Dementia  ? Hx COVID  ? EGD with bx 5/28/21 - Grade B LA classification GERD, small hiatal hernia. Stomach showed gastritis, biopsy negative for H. Pylori infection. Duodenum showed duodenitis with small AVM, biopsy pending to rule out sprue.  No active bleeding seen. Plan:     ? Continue Protonix 40 MG BID  ? Antibiotics per ID  ? Colonoscopy to be done outpatient - Hgb currently stable  ? Patient to arrange follow up appointment and to schedule outpatient colonoscopy  ? Will sign off, please call again if needed      Note: This report was completed utilizing computer voice recognition software. Every effort has been made to ensure accuracy, however; inadvertent computerized transcription errors may be present.      Discussed with Dr. Rui Matt per Dr. Favian Leslie APRN-NP-C 6/3/2021  1:29 PM For Dr. Kassy Valdez

## 2021-06-03 NOTE — PROGRESS NOTES
Occupational Therapy  OT BEDSIDE TREATMENT NOTE      Date:6/3/2021  Patient Name: Umair José  MRN: [id number]  : 1945  Room: [address] [place] [address]     Evaluating OT: Lubna Vega, OTR/L - [id number]     Referring Provider: Esther Kent MD   Specific Provider Orders/Date: \"OT eval and treat\" - 2021     Diagnosis: Septic shock ([facility] [address] [A41.9, R65.21]       Pertinent Medical History: COPD, anxiety, HTN, insomnia, TIA, DM, COVID-19     Precautions: fall risk,  O2 high flow     Assessment of Current Deficits:    [x]? Functional mobility             [x]?ADLs           [x]? Strength                  [x]? Cognition   [x]? Functional transfers           [x]? IADLs         [x]? Safety Awareness   [x]? Endurance   []? Fine Coordination              [x]? Balance      []? Vision/perception   [x]? Sensation     []? Gross Motor Coordination  []? ROM           []? Delirium                   []? Motor Control      OT PLAN OF CARE   OT POC is based on physician orders, patient diagnosis, and results of clinical assessment.   Frequency/Duration 2-5 days/week for 2 weeks PRN   Specific OT Treatment Interventions to Include:   * Instruction/training on adapted ADL techniques and AE recommendations to increase functional independence within        precautions  * Training on energy conservation strategies, correct breathing pattern and techniques to improve independence/tolerance for self-care routine  * Functional transfer/mobility training/DME recommendations for increased independence, safety, and fall prevention  * Patient/Family education to increase follow through with safety techniques and functional independence  * Recommendation of environmental modifications for increased safety with functional transfers/mobility and ADLs  * Cognitive retraining/development of therapeutic activities to improve problem solving, judgement, memory, and attention for increased safety/participation in ADL/IADL tasks  * Therapeutic exercise to improve motor endurance, ROM, and functional strength for ADLs/functional transfers  * Therapeutic activities to facilitate/challenge dynamic balance, stand tolerance for increased safety and independence with ADLs  * Neuro-muscular re-education: facilitation of righting/equilibrium reactions, midline orientation, scapular stability/mobility, normalization of muscle tone, and facilitation of volitional active controled movement  * Positioning to improve skin integrity, interaction with environment and functional independence     Recommended Adaptive Equipment:  continue to assess      Home Living: Patient admitted from [facility]. Prior, patient reported that she lived with family in a one-floor setup. Bathroom Setup: walk-in shower (with seat and grab bar)     Prior Level of Function (PLOF): Patient reported that she was independent with ADLs, but needed assistance with IADLs prior. Patient noted that she had been using a walker for functional mobility at [facility] recently. Driving: Yes, \"a little bit\".     Pain Level: Pt with complaint of severe back pain limiting her tolerance for activity. Cognition: Pt awake and alert. Limited safety awareness and judgement. Limited problem solving.      Functional Assessment:                  AM-PAC Daily Activity Raw Score: 13/24    Initial Eval Status  Date: 6/2/2021 Treatment Status  Date: 6/3/21  Short Term Goals = Long Term Goals   Feeding SBA   Setup   Grooming Mod A Max A   Setup  (seated)   UB Dressing Mod A Mod A   Setup   LB Dressing Dependent Max A   Mod A - with use of AE, as needed/appropriate   Bathing Max A   Mod A - with use of AE/DME, as needed/appropriate   Toileting Dependent for hygiene at bed-level following evidence of bowel incontinence. Max A   catheter  Max A   Bed Mobility  Supine-to-Long-Sitting: Max Ax2   Log Rolling: Mod A for facilitation of hygiene and change of bed linens; verbal/visual cues to maximize patient's participation.   Patient refused to attempt supine-to-sit. Mod A supine to sit onto the side of the bed. Min A sit to supine       Functional Transfers Not assessed. Pt declined to attempt  Mod A   Functional Mobility Not assessed.       Balance Sitting: Poor+ to Fair-  (in long-sitting)  sit balance CGA initially however due to back pain, pt wanting to return to bed and quickly required increased assist for sitting the longer she sat on the EOB. Fair+ dynamic sitting balance during completion of ADLs/IADLs and other functional tasks. Activity Tolerance Poor Poor   Patient will demonstrate Good understanding and consistent implementation of energy conservation techniques and work simplification techniques into ADL routines. B UE Strength 3+/5   Patient will demonstrate 4+/5 B UE strength in order to maximize independence with ADLs and functional transfers       Comments:  Pt agreeable to therapy. States she understands the importance to increase her level of activity however does self limit with complaint of back pain and dizziness. Pt wanting to return to bed quickly after sitting up. Educated pt with need to spend more time sitting in an upright position. Pt verbalized understanding however requesting to lay flat in the bed at end of the session. HOB was elevated and again pt instructed with benefits of increasing upright sitting position and time. Nursing arrived to room during session. Exercise: Limited use of UE's for support when seated on the side of the bed. Education/treatment:  ADL retraining with facilitation of movement to increase self care skills. Therapeutic activity to address balance, strength, and endurance for ADL and transfers. Pt education of benefits of increased activity and daily orientation. · Pt has made  progress towards set goals.        Time In: 1:46  Time Out: 2:15     Min Units   Therapeutic Ex 13101     Therapeutic Activities 39731 15 1   ADL/Self Care 99283 14 1   Orthotic Management 22405     Neuro Re-Ed 79786     Non-Billable Time     TOTAL TIMED TREATMENT [address] [facility] IVETTE/L [id number]

## 2021-06-04 ENCOUNTER — CARE COORDINATION (OUTPATIENT)
Dept: CASE MANAGEMENT | Age: 76
End: 2021-06-04

## 2021-06-08 ENCOUNTER — CARE COORDINATION (OUTPATIENT)
Dept: CASE MANAGEMENT | Age: 76
End: 2021-06-08

## 2021-06-08 NOTE — CARE COORDINATION
[facility] Transitions Follow Up Call    2021    Patient: Jihan Lee  Patient : 1945   MRN: [id number]  Reason for Admission:   Discharge Date: 6/3/21 RARS: Readmission Risk Score: 19    Noted in chart; patient discharged to [address] [place] [address] on 6/3/2021. Continue to follow for BPCI-A Bundle.         Ben Jamil RN

## 2021-06-15 ENCOUNTER — CARE COORDINATION (OUTPATIENT)
Dept: CASE MANAGEMENT | Age: 76
End: 2021-06-15

## 2021-06-22 ENCOUNTER — CARE COORDINATION (OUTPATIENT)
Dept: CASE MANAGEMENT | Age: 76
End: 2021-06-22

## 2021-06-22 NOTE — CARE COORDINATION
[facility] Transitions Follow Up Call    2021    Patient: Luanne Montiel  Patient : 1945   MRN: <[id number]>  Reason for Admission:   Discharge Date: 21 RARS: Readmission Risk Score: 93 Alec Dickerson. Spoke to Obinna Fiore. She confirmed patient was discharged yesterday to [facility]. Care Transitions Subsequent and Final Call    Subsequent and Final Calls  Care Transitions Interventions  Other Interventions: Follow Up  No future appointments.     Yoselin Saucedo LPN

## 2021-06-29 ENCOUNTER — CARE COORDINATION (OUTPATIENT)
Dept: CASE MANAGEMENT | Age: 76
End: 2021-06-29

## 2021-06-30 ENCOUNTER — CARE COORDINATION (OUTPATIENT)
Dept: CASE MANAGEMENT | Age: 76
End: 2021-06-30

## 2021-07-07 ENCOUNTER — CARE COORDINATION (OUTPATIENT)
Dept: CASE MANAGEMENT | Age: 76
End: 2021-07-07

## 2021-07-07 NOTE — CARE COORDINATION
[facility] Transitions Follow Up Call    2021    Patient: Jessica Wakefield  Patient : 1945   MRN: <[id number]>  Reason for Admission: Sepsis  Discharge Date: 21 RARS: Readmission Risk Score: [place] and was told that there is no Jessica Wakefield att he facility    Attempted to call Jameel Andino and did not get an answer. DEBBY Eldridge, RN   [address] [place] [address] Transition Nurse  [phone]      Care Transitions Subsequent and Final Call    Subsequent and Final Calls  Care Transitions Interventions  Other Interventions: Follow Up  No future appointments.     Lindsey Ingram, RN

## 2021-07-14 ENCOUNTER — CARE COORDINATION (OUTPATIENT)
Dept: CASE MANAGEMENT | Age: 76
End: 2021-07-14

## 2021-07-14 NOTE — CARE COORDINATION
[facility] Transitions Follow Up Call    2021    Patient: Inocencia Pardo  Patient : 1945   MRN: <[id number]>  Reason for Admission: Septic shock  Discharge Date: 21 RARS: Readmission Risk Score: 19    Attempted to contact patient for BPCI-A call. [address] [place] [address], [facility] pt is no longer there. Called mobile number. Contact information left to  for \"Jake\" requesting call back at the earliest convenience. Follow Up  No future appointments.     Jas Hollingsworth RN

## 2021-07-21 ENCOUNTER — CARE COORDINATION (OUTPATIENT)
Dept: CASE MANAGEMENT | Age: 76
End: 2021-07-21

## 2021-07-21 NOTE — CARE COORDINATION
[facility] Transitions Follow Up Call    2021    Patient: Melita Klein  Patient : 1945   MRN: <[id number]>  Reason for Admission:   Discharge Date: 21 RARS: Readmission Risk Score: 19    Attempted to reach pt for BPCI-A follow up call. Left message requesting call back. Care Transitions Subsequent and Final Call    Subsequent and Final Calls  Care Transitions Interventions  Other Interventions: Follow Up  No future appointments.     James Roth

## 2021-07-28 ENCOUNTER — CARE COORDINATION (OUTPATIENT)
Dept: CASE MANAGEMENT | Age: 76
End: 2021-07-28

## 2021-07-28 NOTE — CARE COORDINATION
[facility] Transitions Follow Up Call    2021    Patient: Adrienne Boyer  Patient : 1945   MRN: [id number]  Reason for Admission:   Discharge Date: 21 RARS: Readmission Risk Score: 19    Attempted to reach patient by phone regarding follow up; BPCI-A. Spoke with patient's son Tryone Espinosa.   Lolis Hollingsworth reports patient was released from [place] approximately 3 weeks ago d/t lack of coverage.   -Jake provided CTN with patient's phone number; [phone]    CTN spoke with patient, Adrienne Boyer. CTN explained role/reason for call. Patient states she is unable to take call at this time d/t visiting nurse is in the home. Patient reports she is doing fine. Patient states she is thankful for phone call. Emotional support provided; will continue to follow.         Alec Anderson RN

## 2021-07-30 ENCOUNTER — CARE COORDINATION (OUTPATIENT)
Dept: CASE MANAGEMENT | Age: 76
End: 2021-07-30

## 2021-07-30 NOTE — CARE COORDINATION
[facility] Transitions Follow Up Call    2021    Patient: Adrienne Boyer  Patient : 1945   MRN: [id number]  Reason for Admission:   Discharge Date: 21 RARS: Readmission Risk Score: 19       Attempted to reach patient by phone regarding follow up; BPCI-A. Unable to reach patient; no answer. Will attempt outreach at a later time.      Alec Anderson RN

## 2021-08-05 ENCOUNTER — CARE COORDINATION (OUTPATIENT)
Dept: CASE MANAGEMENT | Age: 76
End: 2021-08-05

## 2021-08-05 NOTE — CARE COORDINATION
[facility] Transitions Follow Up Call    2021    Patient: Ritesh Whitmore  Patient : 1945   MRN: <[id number]>  Reason for Admission: Septic shock  Discharge Date: 21 RARS: Readmission Risk Score: 19       Attempted to contact patient for BPCI-A call. Contact information left to  requesting call back at the earliest convenience. Follow Up  No future appointments.     Bharat Newell RN

## 2021-08-12 ENCOUNTER — CARE COORDINATION (OUTPATIENT)
Dept: CASE MANAGEMENT | Age: 76
End: 2021-08-12

## 2021-08-12 NOTE — CARE COORDINATION
[facility] Transitions Follow Up Call    2021    Patient: Hermila Zazueta  Patient : 1945   MRN: <[id number]>  Reason for Admission:   Discharge Date: 21 RARS: Readmission Risk Score: 19    Attempted to contact patient for BPCI-A follow up. Unable to reach patient. No answer. Will try again at a later time. Follow Up  No future appointments.     Selene Jackson RN

## 2021-08-19 ENCOUNTER — CARE COORDINATION (OUTPATIENT)
Dept: CASE MANAGEMENT | Age: 76
End: 2021-08-19

## 2021-08-19 NOTE — CARE COORDINATION
[facility] Transitions Follow Up Call    2021    Patient: Ritesh Whitmore  Patient : 1945   MRN: <[id number]>  Reason for Admission:   Discharge Date: 21 RARS: Readmission Risk Score: 19    Attempted to contact patient for follow up BPCI-A transition call. Unable to leave a voicemail message to return call. Will continue to follow. Care Transitions Subsequent and Final Call    Subsequent and Final Calls  Care Transitions Interventions  Other Interventions: Follow Up  No future appointments.     Claudene Cons, LPN

## 2021-09-01 ENCOUNTER — CARE COORDINATION (OUTPATIENT)
Dept: CASE MANAGEMENT | Age: 76
End: 2021-09-01

## 2021-09-01 NOTE — CARE COORDINATION
[facility] Transitions Follow Up Call    2021    Patient: Ritesh Whitmore  Patient : 1945   MRN: <[id number]>  Reason for Admission:   Discharge Date: 21 RARS: Readmission Risk Score: 19       Attempted to contact patient for follow up BPCI-A transition call. Unable to leave a voicemail message to return call. Will continue to follow. Care Transitions Subsequent and Final Call    Subsequent and Final Calls  Care Transitions Interventions  Other Interventions: Follow Up  No future appointments.     Claudene Cons, LPN

## (undated) DEVICE — GRADUATE TRIANG MEASURE 1000ML BLK PRNT

## (undated) DEVICE — REAGENT TEST UREASE RAPD CLOTEST F/

## (undated) DEVICE — FORCEPS BX OVL CUP FEN DISPOSABLE CAP L 160CM RAD JAW 4

## (undated) DEVICE — SPONGE GZ W4XL4IN RAYON POLY FILL CVR W/ NONWOVEN FAB

## (undated) DEVICE — BLOCK BITE 60FR RUBBER ADLT DENTAL